# Patient Record
Sex: FEMALE | Race: WHITE | Employment: OTHER | URBAN - METROPOLITAN AREA
[De-identification: names, ages, dates, MRNs, and addresses within clinical notes are randomized per-mention and may not be internally consistent; named-entity substitution may affect disease eponyms.]

---

## 2018-12-13 PROBLEM — E11.9 DIABETES (HCC): Status: ACTIVE | Noted: 2018-12-13

## 2018-12-13 PROBLEM — I10 HYPERTENSION: Status: ACTIVE | Noted: 2018-12-13

## 2018-12-13 PROBLEM — M19.90 ARTHRITIS: Status: ACTIVE | Noted: 2018-12-13

## 2020-02-26 PROBLEM — E11.319: Status: ACTIVE | Noted: 2018-12-13

## 2020-02-28 PROBLEM — H35.3122 INTERMEDIATE STAGE NONEXUDATIVE AGE-RELATED MACULAR DEGENERATION OF LEFT EYE: Status: ACTIVE | Noted: 2020-02-28

## 2020-02-28 PROBLEM — H35.81 RETINAL EDEMA: Status: ACTIVE | Noted: 2020-02-28

## 2020-07-28 PROBLEM — Z85.51 HISTORY OF BLADDER CANCER: Status: ACTIVE | Noted: 2020-07-28

## 2020-07-28 PROBLEM — Z90.6 HISTORY OF TOTAL CYSTECTOMY: Status: ACTIVE | Noted: 2020-07-28

## 2020-07-28 PROBLEM — Z43.2 ILEOSTOMY CARE (HCC): Status: ACTIVE | Noted: 2020-07-28

## 2020-10-21 ENCOUNTER — HOME HEALTH ADMISSION (OUTPATIENT)
Dept: HOME HEALTH SERVICES | Facility: HOME HEALTH | Age: 85
End: 2020-10-21

## 2022-03-18 PROBLEM — M19.90 ARTHRITIS: Status: ACTIVE | Noted: 2018-12-13

## 2022-03-18 PROBLEM — Z90.6 HISTORY OF TOTAL CYSTECTOMY: Status: ACTIVE | Noted: 2020-07-28

## 2022-03-19 PROBLEM — Z85.51 HISTORY OF BLADDER CANCER: Status: ACTIVE | Noted: 2020-07-28

## 2022-03-19 PROBLEM — E11.319: Status: ACTIVE | Noted: 2018-12-13

## 2022-03-19 PROBLEM — I10 ESSENTIAL HYPERTENSION: Status: ACTIVE | Noted: 2018-12-13

## 2022-03-20 PROBLEM — H35.3122 INTERMEDIATE STAGE NONEXUDATIVE AGE-RELATED MACULAR DEGENERATION OF LEFT EYE: Status: ACTIVE | Noted: 2020-02-28

## 2022-03-20 PROBLEM — H35.81 RETINAL EDEMA: Status: ACTIVE | Noted: 2020-02-28

## 2022-04-07 ENCOUNTER — HOME HEALTH ADMISSION (OUTPATIENT)
Dept: HOME HEALTH SERVICES | Facility: HOME HEALTH | Age: 87
End: 2022-04-07
Payer: MEDICARE

## 2022-04-08 ENCOUNTER — HOME CARE VISIT (OUTPATIENT)
Dept: SCHEDULING | Facility: HOME HEALTH | Age: 87
End: 2022-04-08
Payer: MEDICARE

## 2022-04-08 PROCEDURE — 400013 HH SOC

## 2022-04-08 PROCEDURE — MED11061 SALINE,.9%,15 ML,VIAL

## 2022-04-08 PROCEDURE — A6197 ALGINATE DRSG >16 <=48 SQ IN: HCPCS

## 2022-04-08 PROCEDURE — G0299 HHS/HOSPICE OF RN EA 15 MIN: HCPCS

## 2022-04-08 PROCEDURE — 400018 HH-NO PAY CLAIM PROCEDURE

## 2022-04-08 PROCEDURE — A6257 TRANSPARENT FILM <= 16 SQ IN: HCPCS

## 2022-04-08 PROCEDURE — 3331090002 HH PPS REVENUE DEBIT

## 2022-04-08 PROCEDURE — A6216 NON-STERILE GAUZE<=16 SQ IN: HCPCS

## 2022-04-08 PROCEDURE — A9270 NON-COVERED ITEM OR SERVICE: HCPCS

## 2022-04-08 PROCEDURE — 3331090001 HH PPS REVENUE CREDIT

## 2022-04-09 PROCEDURE — 3331090001 HH PPS REVENUE CREDIT

## 2022-04-09 PROCEDURE — 3331090002 HH PPS REVENUE DEBIT

## 2022-04-10 VITALS
RESPIRATION RATE: 18 BRPM | HEART RATE: 71 BPM | OXYGEN SATURATION: 98 % | TEMPERATURE: 98 F | SYSTOLIC BLOOD PRESSURE: 134 MMHG | DIASTOLIC BLOOD PRESSURE: 76 MMHG

## 2022-04-10 PROCEDURE — 3331090002 HH PPS REVENUE DEBIT

## 2022-04-10 PROCEDURE — 3331090001 HH PPS REVENUE CREDIT

## 2022-04-11 ENCOUNTER — HOME CARE VISIT (OUTPATIENT)
Dept: SCHEDULING | Facility: HOME HEALTH | Age: 87
End: 2022-04-11
Payer: MEDICARE

## 2022-04-11 VITALS
SYSTOLIC BLOOD PRESSURE: 138 MMHG | TEMPERATURE: 98.8 F | RESPIRATION RATE: 20 BRPM | OXYGEN SATURATION: 97 % | DIASTOLIC BLOOD PRESSURE: 88 MMHG | HEART RATE: 72 BPM

## 2022-04-11 PROCEDURE — 3331090001 HH PPS REVENUE CREDIT

## 2022-04-11 PROCEDURE — 3331090002 HH PPS REVENUE DEBIT

## 2022-04-11 PROCEDURE — G0299 HHS/HOSPICE OF RN EA 15 MIN: HCPCS

## 2022-04-12 PROCEDURE — 3331090001 HH PPS REVENUE CREDIT

## 2022-04-12 PROCEDURE — 3331090002 HH PPS REVENUE DEBIT

## 2022-04-13 ENCOUNTER — HOME CARE VISIT (OUTPATIENT)
Dept: SCHEDULING | Facility: HOME HEALTH | Age: 87
End: 2022-04-13
Payer: MEDICARE

## 2022-04-13 VITALS
DIASTOLIC BLOOD PRESSURE: 57 MMHG | SYSTOLIC BLOOD PRESSURE: 122 MMHG | HEART RATE: 77 BPM | OXYGEN SATURATION: 96 % | TEMPERATURE: 99.3 F | RESPIRATION RATE: 18 BRPM

## 2022-04-13 PROCEDURE — 3331090001 HH PPS REVENUE CREDIT

## 2022-04-13 PROCEDURE — G0299 HHS/HOSPICE OF RN EA 15 MIN: HCPCS

## 2022-04-13 PROCEDURE — 3331090002 HH PPS REVENUE DEBIT

## 2022-04-14 PROCEDURE — 3331090001 HH PPS REVENUE CREDIT

## 2022-04-14 PROCEDURE — 3331090002 HH PPS REVENUE DEBIT

## 2022-04-15 ENCOUNTER — HOME CARE VISIT (OUTPATIENT)
Dept: SCHEDULING | Facility: HOME HEALTH | Age: 87
End: 2022-04-15
Payer: MEDICARE

## 2022-04-15 VITALS
OXYGEN SATURATION: 99 % | TEMPERATURE: 98.9 F | DIASTOLIC BLOOD PRESSURE: 82 MMHG | SYSTOLIC BLOOD PRESSURE: 122 MMHG | RESPIRATION RATE: 18 BRPM | HEART RATE: 68 BPM

## 2022-04-15 PROCEDURE — 3331090001 HH PPS REVENUE CREDIT

## 2022-04-15 PROCEDURE — A6252 ABSORPT DRG >16 <=48 W/O BDR: HCPCS

## 2022-04-15 PROCEDURE — 3331090002 HH PPS REVENUE DEBIT

## 2022-04-15 PROCEDURE — MED12521

## 2022-04-15 PROCEDURE — MED10156 SCISSORS,BANDAGE,LISTER,5.5

## 2022-04-15 PROCEDURE — G0299 HHS/HOSPICE OF RN EA 15 MIN: HCPCS

## 2022-04-15 PROCEDURE — A6258 TRANSPARENT FILM >16<=48 IN: HCPCS

## 2022-04-16 PROCEDURE — 3331090001 HH PPS REVENUE CREDIT

## 2022-04-16 PROCEDURE — 3331090002 HH PPS REVENUE DEBIT

## 2022-04-17 PROCEDURE — 3331090001 HH PPS REVENUE CREDIT

## 2022-04-17 PROCEDURE — 3331090002 HH PPS REVENUE DEBIT

## 2022-04-18 ENCOUNTER — HOME CARE VISIT (OUTPATIENT)
Dept: SCHEDULING | Facility: HOME HEALTH | Age: 87
End: 2022-04-18
Payer: MEDICARE

## 2022-04-18 VITALS
SYSTOLIC BLOOD PRESSURE: 136 MMHG | OXYGEN SATURATION: 95 % | HEART RATE: 73 BPM | TEMPERATURE: 98.6 F | RESPIRATION RATE: 18 BRPM | DIASTOLIC BLOOD PRESSURE: 68 MMHG

## 2022-04-18 PROCEDURE — G0299 HHS/HOSPICE OF RN EA 15 MIN: HCPCS

## 2022-04-18 PROCEDURE — 3331090001 HH PPS REVENUE CREDIT

## 2022-04-18 PROCEDURE — 3331090002 HH PPS REVENUE DEBIT

## 2022-04-19 PROCEDURE — 3331090001 HH PPS REVENUE CREDIT

## 2022-04-19 PROCEDURE — 3331090002 HH PPS REVENUE DEBIT

## 2022-04-20 PROCEDURE — 3331090002 HH PPS REVENUE DEBIT

## 2022-04-20 PROCEDURE — 3331090001 HH PPS REVENUE CREDIT

## 2022-04-21 ENCOUNTER — HOME CARE VISIT (OUTPATIENT)
Dept: SCHEDULING | Facility: HOME HEALTH | Age: 87
End: 2022-04-21
Payer: MEDICARE

## 2022-04-21 VITALS
TEMPERATURE: 98.6 F | SYSTOLIC BLOOD PRESSURE: 126 MMHG | DIASTOLIC BLOOD PRESSURE: 60 MMHG | OXYGEN SATURATION: 98 % | HEART RATE: 69 BPM | RESPIRATION RATE: 18 BRPM

## 2022-04-21 PROCEDURE — A4371 SKIN BARRIER POWDER PER OZ: HCPCS

## 2022-04-21 PROCEDURE — A4385 OST SKN BARRIER SLD EXT WEAR: HCPCS

## 2022-04-21 PROCEDURE — 3331090002 HH PPS REVENUE DEBIT

## 2022-04-21 PROCEDURE — G0299 HHS/HOSPICE OF RN EA 15 MIN: HCPCS

## 2022-04-21 PROCEDURE — 3331090001 HH PPS REVENUE CREDIT

## 2022-04-21 PROCEDURE — A5120 SKIN BARRIER, WIPE OR SWAB: HCPCS

## 2022-04-22 PROCEDURE — 3331090002 HH PPS REVENUE DEBIT

## 2022-04-22 PROCEDURE — 3331090001 HH PPS REVENUE CREDIT

## 2022-04-23 PROCEDURE — 3331090002 HH PPS REVENUE DEBIT

## 2022-04-23 PROCEDURE — 3331090001 HH PPS REVENUE CREDIT

## 2022-04-24 PROCEDURE — 3331090002 HH PPS REVENUE DEBIT

## 2022-04-24 PROCEDURE — 3331090001 HH PPS REVENUE CREDIT

## 2022-04-25 ENCOUNTER — HOME CARE VISIT (OUTPATIENT)
Dept: SCHEDULING | Facility: HOME HEALTH | Age: 87
End: 2022-04-25
Payer: MEDICARE

## 2022-04-25 VITALS
TEMPERATURE: 98.9 F | DIASTOLIC BLOOD PRESSURE: 68 MMHG | SYSTOLIC BLOOD PRESSURE: 135 MMHG | RESPIRATION RATE: 18 BRPM | HEART RATE: 80 BPM | OXYGEN SATURATION: 98 %

## 2022-04-25 PROCEDURE — 3331090002 HH PPS REVENUE DEBIT

## 2022-04-25 PROCEDURE — G0299 HHS/HOSPICE OF RN EA 15 MIN: HCPCS

## 2022-04-25 PROCEDURE — 3331090001 HH PPS REVENUE CREDIT

## 2022-04-26 PROCEDURE — 3331090002 HH PPS REVENUE DEBIT

## 2022-04-26 PROCEDURE — 3331090001 HH PPS REVENUE CREDIT

## 2022-04-27 ENCOUNTER — HOME CARE VISIT (OUTPATIENT)
Dept: SCHEDULING | Facility: HOME HEALTH | Age: 87
End: 2022-04-27
Payer: MEDICARE

## 2022-04-27 VITALS
TEMPERATURE: 98.7 F | OXYGEN SATURATION: 100 % | RESPIRATION RATE: 18 BRPM | HEART RATE: 68 BPM | DIASTOLIC BLOOD PRESSURE: 52 MMHG | SYSTOLIC BLOOD PRESSURE: 122 MMHG

## 2022-04-27 PROCEDURE — A6196 ALGINATE DRESSING <=16 SQ IN: HCPCS

## 2022-04-27 PROCEDURE — 3331090001 HH PPS REVENUE CREDIT

## 2022-04-27 PROCEDURE — 3331090002 HH PPS REVENUE DEBIT

## 2022-04-27 PROCEDURE — A6252 ABSORPT DRG >16 <=48 W/O BDR: HCPCS

## 2022-04-27 PROCEDURE — A6259 TRANSPARENT FILM > 48 SQ IN: HCPCS

## 2022-04-27 PROCEDURE — G0299 HHS/HOSPICE OF RN EA 15 MIN: HCPCS

## 2022-04-28 PROCEDURE — 3331090001 HH PPS REVENUE CREDIT

## 2022-04-28 PROCEDURE — 3331090002 HH PPS REVENUE DEBIT

## 2022-04-29 ENCOUNTER — HOME CARE VISIT (OUTPATIENT)
Dept: SCHEDULING | Facility: HOME HEALTH | Age: 87
End: 2022-04-29
Payer: MEDICARE

## 2022-04-29 ENCOUNTER — HOSPITAL ENCOUNTER (OUTPATIENT)
Dept: WOUND CARE | Age: 87
Discharge: HOME | End: 2022-04-29
Attending: SURGERY
Payer: MEDICARE

## 2022-04-29 VITALS
SYSTOLIC BLOOD PRESSURE: 167 MMHG | DIASTOLIC BLOOD PRESSURE: 83 MMHG | HEART RATE: 70 BPM | WEIGHT: 154 LBS | BODY MASS INDEX: 30.23 KG/M2 | RESPIRATION RATE: 18 BRPM | TEMPERATURE: 97 F | HEIGHT: 60 IN

## 2022-04-29 DIAGNOSIS — I87.2 VENOUS STASIS DERMATITIS OF RIGHT LOWER EXTREMITY: ICD-10-CM

## 2022-04-29 DIAGNOSIS — I89.0 LYMPHEDEMA: ICD-10-CM

## 2022-04-29 DIAGNOSIS — I87.331 CHRONIC VENOUS HTN W ULCER AND INFLAMMATION OF R LOW EXTREM (HCC): ICD-10-CM

## 2022-04-29 DIAGNOSIS — L97.919 CHRONIC VENOUS HTN W ULCER AND INFLAMMATION OF R LOW EXTREM (HCC): ICD-10-CM

## 2022-04-29 DIAGNOSIS — E11.622 TYPE 2 DIABETES MELLITUS WITH OTHER SKIN ULCER, WITHOUT LONG-TERM CURRENT USE OF INSULIN (HCC): ICD-10-CM

## 2022-04-29 PROBLEM — E11.628 TYPE 2 DIABETES MELLITUS WITH SKIN COMPLICATION, WITHOUT LONG-TERM CURRENT USE OF INSULIN (HCC): Status: ACTIVE | Noted: 2018-12-13

## 2022-04-29 PROCEDURE — 3331090001 HH PPS REVENUE CREDIT

## 2022-04-29 PROCEDURE — 3331090002 HH PPS REVENUE DEBIT

## 2022-04-29 PROCEDURE — 29581 APPL MULTLAYER CMPRN SYS LEG: CPT

## 2022-04-29 PROCEDURE — 99204 OFFICE O/P NEW MOD 45 MIN: CPT | Performed by: SURGERY

## 2022-04-29 NOTE — WOUND CARE
Multilayer Compression Wrap   (Not Unna) Below the Knee    NAME:  Magaly Yu  YOB: 1935  MEDICAL RECORD NUMBER:  [id number]  DATE:  4/29/2022    Removed old Multilayer wrap if indicated and wash leg with mild soap/water. Applied moisturizing agent to dry skin as needed. Applied primary and secondary dressing as ordered. Applied multilayered dressing below the knee to right lower leg. Instructed patient/caregiver not to remove dressing and to keep it clean and dry. Instructed patient/caregiver on complications to report to provider, such as pain, numbness in toes, heavy drainage, and slippage of dressing. Instructed patient on purpose of compression dressing and on activity and exercise recommendations.     Response to treatment: Well tolerated by patient       Electronically signed by Hussein Orantes RN on 4/29/2022 at 11:02 AM

## 2022-04-29 NOTE — DISCHARGE INSTRUCTIONS
[facility]ail [facility], [address]  Phone: [phone]  Fax: [phone]     Patient: Shayy Edwards MRN: [id number]  SSN: [id number]    YOB: 1935  Age: 81 y.o. Sex: female       Return Appointment: 1 week with Dr. Amie Steele     Instructions: Right lower leg wounds:  Clean wound with saline. Apply zinc patches to wounds. Cover with ABD or exudry. Wrap right lower leg with 2 layer compression system. Home health to change dressing on Monday.     Follow up in wound center on Thursday, May 5th. Do not get wrap wet in shower.  May purchase cast cover from [employer] to keep dry in the shower.        Should you experience increased redness, swelling, pain, foul odor, size of wound(s), or have a temperature over 101 degrees please contact the [address] [place] [address] Road at [phone] or if after hours contact your primary care physician or go to the hospital emergency department.     Signed By: Caryn Boss RN      April 29, 2022

## 2022-04-29 NOTE — PROGRESS NOTES
[address]  Consult Note/H&P/Progress Note      [address] RECORD NUMBER:  [id number]  AGE: 81 y.o. RACE WHITE/NON-  GENDER: female  : 1935  EPISODE DATE:  2022       Subjective:      CC (Chief Complaint) and HISTORY of PRESENT ILLNESS (HPI):    Alyssa Velasco is a 81 y.o. female who presents today for wound/ulcer evaluation. Her first visit to the wound center with us was on 2022  She reported progressive ulcerations and blistering of her right lower extremity since approximately 2021. Nothing in particular made her condition better or worse. She associated the condition with use of a venous thrombosis pump which was placed on her leg in the summer 2021.   No associated fevers    She has been diabetic for approximately 20 years       This information was obtained from the patient  The following HPI elements were documented for the patient's wound:  Location: Right lower extremity ulcers  Duration: Since approximately 2021  Severity: Moderate severity  Context: History of diabetes mellitus for 20 years  Modifying Factors:  Nothing makes better or worse  Quality: No reported history of DVT  Timing:     Associated Signs and Symptoms: No fevers      Ulcer Identification:  Ulcer Type: venous    Contributing Factors: venous stasis, lymphedema and diabetes            PAST MEDICAL HISTORY  Past Medical History:   Diagnosis Date    Arthritis     Cancer ([address]         PAST SURGICAL HISTORY  Past Surgical History:   Procedure Laterality Date    HX ACL RECONSTRUCTION      HX AMPUTATION TOE  2021    pt had a hangmail which infected    HX CATARACT REMOVAL Left     HX CHOLECYSTECTOMY      HX CYSTECTOMY  2008    Bladder removal wears a bag    HX GYN      HX ORTHOPAEDIC      HX TOTAL VAGINAL HYSTERECTOMY         FAMILY HISTORY  Family History   Problem Relation Age of Onset    Heart Disease Father  Diabetes Sister     Heart Disease Sister        SOCIAL HISTORY  Social History     Tobacco Use    Smoking status: Never Smoker    Smokeless tobacco: Never Used   Vaping Use    Vaping Use: Never used   Substance Use Topics    Alcohol use: No    Drug use: No       ALLERGIES  Allergies   Allergen Reactions    Penicillins Rash       MEDICATIONS  Current Outpatient Medications on File Prior to Encounter   Medication Sig Dispense Refill    atenoloL (TENORMIN) 50 mg tablet TAKE 2 TABLETS BY MOUTH DAILY WITH BREAKFAST 180 Tablet 0    losartan (COZAAR) 50 mg tablet Take 1 Tablet by mouth daily. 90 Tablet 0    metFORMIN ER (GLUCOPHAGE XR) 500 mg tablet Take 1 Tablet by mouth two (2) times a day. 180 Tablet 3    furosemide (LASIX) 20 mg tablet Take 1 Tablet by mouth daily. 30 Tablet 3    triamcinolone acetonide (KENALOG) 0.1 % topical cream Apply  to affected area two (2) times a day. use thin layer (Patient taking differently: Apply  to affected area two (2) times a day. use thin layer on RLE) 453.6 g 0    Blood-Glucose Meter monitoring kit Check sugar once daily, dx diabetes mellitus, uncomplicated 1 Kit 0    multivitamin, tx-iron-ca-min (THERA-M W/ IRON) 9 mg iron-400 mcg tab tablet Take 1 Tab by mouth daily.  acetaminophen (TYLENOL EXTRA STRENGTH) 500 mg tablet Take 500 mg by mouth every six (6) hours as needed for Pain.  Calcium-Cholecalciferol, D3, (CALCIUM 600 WITH VITAMIN D3) 600 mg(1,500mg) -400 unit chew Take 1 Tablet by mouth daily.  B.infantis-B.ani-B.long-B.bifi (PROBIOTIC 4X) 10-15 mg TbEC Take 1 Tablet by mouth daily. No current facility-administered medications on file prior to encounter. REVIEW OF SYSTEMS  The patient has no difficulty with chest pain or shortness of breath. No fever or chills. Comprehensive review of systems was otherwise unremarkable except as noted above.       Objective:   Physical Exam:   Recent vitals (if inpt):  Patient Vitals for the past 24 hrs:   BP Temp Pulse Resp Height Weight   04/29/22 1021 (!) 167/83 97 °F (36.1 °C) 70 18 5' 0.02\" (1.525 m) 154 lb (69.9 kg)       Visit Vitals  BP (!) 167/83 (BP 1 Location: Left upper arm, BP Patient Position: At rest)   Pulse 70   Temp 97 °F (36.1 °C)   Resp 18   Ht 5' 0.02\" (1.525 m)   Wt 154 lb (69.9 kg)   BMI 30.05 kg/m²     Wt Readings from Last 3 Encounters:   04/29/22 154 lb (69.9 kg)   04/06/22 157 lb (71.2 kg)   02/26/20 169 lb (76.7 kg)     Constitutional: Alert, oriented, cooperative patient in no acute distress; appears stated age;  General appearance is within normal limits for wound care patient population. See the today's recorded vitals signs and constitutional data. Eyes: Pupils equal; Sclera are clear. EOMs intact; The eyes appear to track and move normally. The sclera are not injected. The conjunctive are clear. The eyelids are normal. There is no scleral icterus. ENMT: There are no obvious external ear, nose, lip or mouth lesions. Nares normal; Neck: Overall contour of the neck is normal with no obvious neck masses. Gross hearing is within normal limits. No obvious neck masses  Resp:  Breathing is non-labored; normal rate and effort; no audible wheezing. CV: RRR;  no JVD; No evidence of cyanosis of the upper extremities. The extremities are perfused without embolic sign, splinter hemorrhages, or petechia. GI: soft and non-distended without acute abnormality noted. Musculoskeletal: unremarkable with normal function. No embolic signs or cyanosis. Neuro:  Oriented; moves all 4; no focal deficits  Psychiatric: Judgement and insight are within normal limits for the wound care population of patients. Patient is oriented to person, place, and time. Recent and remote memory are within normal limits. Mood and affect are within normal limits. Integumentary (Skin/Wounds)  See inspection of wound(s) below. There are no other skin areas of palpable concern.    See wound center documentation including photos in the [address] Wound Template made part of this record by reference.      Wounds:  Wound Leg lower Right;Medial;Proximal #1 (Active)   Wound Image   04/29/22 1032   Wound Etiology Venous 04/29/22 1032   Dressing Status Old drainage noted 04/29/22 1032   Cleansed Cleansed with saline 04/29/22 1032   Dressing/Treatment ABD pad 04/29/22 1032   Wound Length (cm) 1.5 cm 04/29/22 1032   Wound Width (cm) 2.5 cm 04/29/22 1032   Wound Depth (cm) 0.1 cm 04/29/22 1032   Wound Surface Area (cm^2) 3.75 cm^2 04/29/22 1032   Change in Wound Size % 25 04/29/22 1032   Wound Volume (cm^3) 0.375 cm^3 04/29/22 1032   Wound Healing % 25 04/29/22 1032   Wound Assessment Slough;Bunker/red 04/29/22 1032   Drainage Amount Moderate 04/29/22 1032   Drainage Description Serous 04/29/22 1032   Wound Odor None 04/29/22 1032   Jennifer-Wound/Incision Assessment Edematous 04/29/22 1032   Wound Thickness Description Full thickness 04/29/22 1032   Number of days: 21       Wound Leg lower Lower;Right;Medial;Distal #2 (Active)   Wound Image   04/29/22 1032   Wound Etiology Venous 04/29/22 1032   Dressing Status Old drainage noted 04/29/22 1032   Cleansed Cleansed with saline 04/29/22 1032   Dressing/Treatment ABD pad 04/29/22 1032   Wound Length (cm) 1 cm 04/29/22 1032   Wound Width (cm) 0.6 cm 04/29/22 1032   Wound Depth (cm) 0.1 cm 04/29/22 1032   Wound Surface Area (cm^2) 0.6 cm^2 04/29/22 1032   Change in Wound Size % 31.82 04/29/22 1032   Wound Volume (cm^3) 0.06 cm^3 04/29/22 1032   Wound Healing % 66 04/29/22 1032   Wound Assessment Pink/red 04/29/22 1032   Drainage Amount Moderate 04/29/22 1032   Drainage Description Serous 04/29/22 1032   Wound Odor None 04/29/22 1032   Jennifer-Wound/Incision Assessment Edematous 04/29/22 1032   Wound Thickness Description Full thickness 04/29/22 1032   Number of days: 18       Wound Leg lower Right;Posterior;Proximal #3 (Active)   Wound Image   04/29/22 1032   Wound Etiology Venous 04/29/22 1032   Dressing Status Old drainage noted 04/29/22 1032   Cleansed Cleansed with saline 04/29/22 1032   Dressing/Treatment ABD pad 04/29/22 1032   Wound Length (cm) 1 cm 04/29/22 1032   Wound Width (cm) 1.4 cm 04/29/22 1032   Wound Depth (cm) 0.1 cm 04/29/22 1032   Wound Surface Area (cm^2) 1.4 cm^2 04/29/22 1032   Change in Wound Size % 22.22 04/29/22 1032   Wound Volume (cm^3) 0.14 cm^3 04/29/22 1032   Wound Healing % 22 04/29/22 1032   Wound Assessment Pink/red 04/29/22 1032   Drainage Amount Moderate 04/29/22 1032   Drainage Description Serous 04/29/22 1032   Wound Odor None 04/29/22 1032   Jennifer-Wound/Incision Assessment Edematous 04/29/22 1032   Wound Thickness Description Full thickness 04/29/22 1032   Number of days: 18       Wound Leg Lower; Posterior;Right;Distal #4 (Active)   Wound Image   04/29/22 1032   Wound Etiology Venous 04/29/22 1032   Dressing Status Old drainage noted 04/29/22 1032   Cleansed Cleansed with saline 04/29/22 1032   Dressing/Treatment ABD pad 04/29/22 1032   Wound Length (cm) 2.2 cm 04/29/22 1032   Wound Width (cm) 1.2 cm 04/29/22 1032   Wound Depth (cm) 0.1 cm 04/29/22 1032   Wound Surface Area (cm^2) 2.64 cm^2 04/29/22 1032   Change in Wound Size % 14.29 04/29/22 1032   Wound Volume (cm^3) 0.264 cm^3 04/29/22 1032   Wound Healing % 14 04/29/22 1032   Wound Assessment Pink/red 04/29/22 1032   Drainage Amount Moderate 04/29/22 1032   Drainage Description Serous 04/29/22 1032   Wound Odor None 04/29/22 1032   Jennifer-Wound/Incision Assessment Edematous 04/29/22 1032   Wound Thickness Description Full thickness 04/29/22 1032   Number of days: 2                          Amount and/or Complexity of Data Reviewed and Analyzed:  I reviewed and analyzed all of the unique labs and radiologic studies that are shown below as well as any that are in the HPI, and any that are in the expanded problem list below  *Each unique test, order, or document contributes to the combination of 2 or combination of 3 in Category 1 below. I also independently reviewed radiology images for studies I described above in the HPI or studies I have ordered. For this visit I also reviewed old records and prior notes. No results for input(s): WBC, HGB, PLT, NA, K, CL, CO2, BUN, CREA, GLU, PTP, INR, APTT, TBIL, TBILI, CBIL, ALT, AP, AML, AML, LPSE, LCAD, NH4, TROPT, TROIQ, PCO2, PO2, HCO3, HGBEXT, PLTEXT, INREXT, HGBEXT, PLTEXT, INREXT in the last 72 hours. No lab exists for component: SGOT, GPT,  PH  No results for input(s): PTP, INR, APTT, TBIL, TBILI, CBIL, ALT, AP, AML, LPSE, INREXT, INREXT in the last 72 hours. No lab exists for component: SGOT, GPT    Most recent blood counts (CBC) review  Lab Results   Component Value Date/Time    WBC 6.5 04/06/2022 04:31 PM    HGB 10.3 (L) 04/06/2022 04:31 PM    HCT 32.3 (L) 04/06/2022 04:31 PM    PLATELET 003 33/78/5270 04:31 PM    MCV 93 04/06/2022 04:31 PM     Most recent chemistry (BMP) test review   Lab Results   Component Value Date/Time    Sodium 144 04/06/2022 04:31 PM    Potassium 4.8 04/06/2022 04:31 PM    Chloride 110 (H) 04/06/2022 04:31 PM    CO2 19 (L) 04/06/2022 04:31 PM    Glucose 119 (H) 04/06/2022 04:31 PM    BUN 30 (H) 04/06/2022 04:31 PM    Creatinine 1.38 (H) 04/06/2022 04:31 PM    BUN/Creatinine ratio 22 04/06/2022 04:31 PM    GFR est AA 34 (L) 07/31/2020 11:48 AM    GFR est non-AA 30 (L) 07/31/2020 11:48 AM    Calcium 9.7 04/06/2022 04:31 PM     Most recent Liver enzyme test review  Lab Results   Component Value Date/Time    ALT (SGPT) 13 04/06/2022 04:31 PM    AST (SGOT) 26 04/06/2022 04:31 PM    Alk.  phosphatase 104 04/06/2022 04:31 PM    Bilirubin, total 0.3 04/06/2022 04:31 PM     Most recent coagulation (coags) review  No results found for: INR, PTMR, PTP, PT1, PT2, INREXT, INREXT  No results found for: INR, APTT, CBIL, AML, AML, LPSE, LCAD, NH4, TROPT, TROIQ, INREXT, INREXT    Diabetic assessment  Lab Results   Component Value Date/Time    Hemoglobin A1c 6.9 (H) 04/06/2022 04:31 PM       Nutritional assessment screen to assess wound healing ability:  Lab Results   Component Value Date/Time    Protein, total 7.3 04/06/2022 04:31 PM    Albumin 4.0 04/06/2022 04:31 PM     Lab Results   Component Value Date/Time    Protein, total 7.3 04/06/2022 04:31 PM    Albumin 4.0 04/06/2022 04:31 PM       XR Results (most recent):  No results found for this or any previous visit. CT Results (most recent):  No results found for this or any previous visit.         Admission date (for inpatients): 4/29/2022   * No surgery found *  * No surgery found *    Assessment:      Problem List Items Addressed This Visit     None          Problem List  Date Reviewed: 4/29/2022          Codes Class Noted    BMI 30.0-30.9,adult ICD-10-CM: Z68.30  ICD-9-CM: V85.30  4/29/2022        Chronic venous htn w ulcer and inflammation of r low extrem ([facility] ICD-10-CM: I87.331, L97.919  ICD-9-CM: 459.33  4/29/2022        Lymphedema ICD-10-CM: I89.0  ICD-9-CM: 457.1  4/29/2022        Venous stasis dermatitis of right lower extremity ICD-10-CM: I87.2  ICD-9-CM: 454.1  4/29/2022        History of bladder cancer ICD-10-CM: Z85.51  ICD-9-CM: V10.51  7/28/2020        History of total cystectomy ICD-10-CM: Z90.6  ICD-9-CM: V45.74  7/28/2020    Overview Signed 7/28/2020  4:15 PM by LYNN Cervantes     2/2008--due to bladder CA             Retinal edema ICD-10-CM: H35.81  ICD-9-CM: 362.83  2/28/2020        Intermediate stage nonexudative age-related macular degeneration of left eye ICD-10-CM: H35.3122  ICD-9-CM: 362.51  2/28/2020        Essential hypertension ICD-10-CM: I10  ICD-9-CM: 401.9  12/13/2018        Type 2 diabetes mellitus with skin complication, without long-term current use of insulin ([facility] ICD-10-CM: E11.628  ICD-9-CM: 250.80  12/13/2018        Arthritis ICD-10-CM: M19.90  ICD-9-CM: 716.90  12/13/2018    Overview Signed 2/26/2020  5:22 PM by Angelika, [place], [address]     Lower back                   Active Problems:    Type 2 diabetes mellitus with skin complication, without long-term current use of insulin ([address] (12/13/2018)      BMI 30.0-30.9,adult (4/29/2022)      Chronic venous htn w ulcer and inflammation of r low extrem ([address] (4/29/2022)      Lymphedema (4/29/2022)      Venous stasis dermatitis of right lower extremity (4/29/2022)            Plan:     Number and Complexity of Problems addressed and   Risks of complications and/or morbidity of management    Treatment Note please see attached Discharge Instructions  Any procedures done today in the wound center (if documented in a separate note) in [facility] are made part of this record by reference  Written patient dismissal instructions given to patient or POA. Right lower extremity venous stasis ulcers  Weekly multilayer compression dressings. This first week she will have a second dressing performed in the same week by home health  We encouraged elevation  We encouraged her to purchase a cast cover from [employer] to keep the dressing dry. Venous dermatitis  Weekly Desitin    Lymphedema  Elevation  Salt avoidance  Compression  Weight loss      DM2 with HgbA1c 6.9 on 4/6/22  Encourage adherence to diabetic diet and diabetic medical regimen to help facilitate wound healing  Encourage close follow-up with primary care physician  Encourage HgbA1c q3 months      BMI>30  Encourage weight loss          Wound Care  No orders of the defined types were placed in this encounter.       Follow-up Information     Follow up With Specialties Details Why Contact Info    [address] In 1 week  Manish Matson [address] [facility] [address] [phone]                  Level of MDM (2/3 elements below)  Number and Complexity of Problems Addressed Amount and/or Complexity of Data to be Reviewed and Analyzed  *Each unique test, order, or document contributes to the combination of 2 or combination of 3 in Category 1 below.  Risk of Complications and/or Morbidity or Mortality of pt Management     46485  32388 SF Minimal  1self-limited or minor problem Minimal or none Minimal risk of morbidity from additional diagnostic testing or Rx   87969  56440 Low Low  2or more self-limited or minor problems;    or  1stable chronic illness;    or  4HYSIE, uncomplicated illness or injury   Limited  (Must meet the requirements of at least 1 of the 2 categories)  Category 1: Tests and documents   Any combination of 2 from the following:  Review of prior external note(s) from each unique source*;  review of the result(s) of each unique test*;   ordering of each unique test*    or   Category 2: Assessment requiring an independent historian(s)  (For the categories of independent interpretation of tests and discussion of management or test interpretation, see moderate or high) Low risk of morbidity from additional diagnostic testing or treatment     14444  41103 Mod Moderate  1or more chronic illnesses with exacerbation, progression, or side effects of treatment;    or  2or more stable chronic illnesses;    or  1undiagnosed new problem with uncertain prognosis;    or  1acute illness with systemic symptoms;    or  2ZLRWF complicated injury   Moderate  (Must meet the requirements of at least 1 out of 3 categories)  Category 1: Tests, documents, or independent historian(s)  Any combination of 3 from the following:   Review of prior external note(s) from each unique source*;  Review of the result(s) of each unique test*;  Ordering of each unique test*;  Assessment requiring an independent historian(s)    or  Category 2: Independent interpretation of tests   Independent interpretation of a test performed by another physician/other qualified health care professional (not separately reported);     or  Category 3: Discussion of management or test interpretation  Discussion of management or test interpretation with external physician/other qualified health care professional/appropriate source (not separately reported)   Moderate risk of morbidity from additional diagnostic testing or treatment  Examples only:  Prescription drug management   Decision regarding minor surgery with identified patient or procedure risk factors  Decision regarding elective major surgery without identified patient or procedure risk factors   Diagnosis or treatment significantly limited by social determinants of health       82848  31544 High High  1or more chronic illnesses with severe exacerbation, progression, or side effects of treatment;    or  1 acute or chronic illness or injury that poses a threat to life or bodily function   Extensive  (Must meet the requirements of at least 2 out of 3 categories)  Category 1: Tests, documents, or independent historian(s)  Any combination of 3 from the following:   Review of prior external note(s) from each unique source*;  Review of the result(s) of each unique test*;   Ordering of each unique test*;   Assessment requiring an independent historian(s)    or   Category 2: Independent interpretation of tests   Independent interpretation of a test performed by another physician/other qualified health care professional (not separately reported);     or  Category 3: Discussion of management or test interpretation  Discussion of management or test interpretation with external physician/other qualified health care professional/appropriate source (not separately reported)   High risk of morbidity from additional diagnostic testing or treatment  Examples only:  Drug therapy requiring intensive monitoring for toxicity  Decision regarding elective major surgery with identified patient or procedure risk factors  Decision regarding emergency major surgery  Decision regarding hospitalization  Decision not to resuscitate or to de-escalate care because of poor prognosis                 I have personally performed a face-to-face diagnostic evaluation and management  service on this patient. I have independently seen the patient. I have independently obtained the above history from the patient/family. I have independently examined the patient with above findings. I have independently reviewed data/labs for this patient and developed the above plan of care (MDM).   Electronically signed by Zion Carrero MD on 4/29/2022 at 10:06 AM

## 2022-04-29 NOTE — WOUND CARE
04/29/22 1032   Wound Leg lower Right;Medial;Proximal #1   Date First Assessed: 04/08/22   Primary Wound Type: (c) Venous  Location: Leg lower  Wound Location Orientation: Right;Medial;Proximal  Wound Description: #1   Wound Image    Wound Etiology Venous   Dressing Status Old drainage noted   Cleansed Cleansed with saline   Dressing/Treatment ABD pad   Wound Length (cm) 1.5 cm   Wound Width (cm) 2.5 cm   Wound Depth (cm) 0.1 cm   Wound Surface Area (cm^2) 3.75 cm^2   Change in Wound Size % 25   Wound Volume (cm^3) 0.375 cm^3   Wound Healing % 25   Wound Assessment Slough;Pink/red   Drainage Amount Moderate   Drainage Description Serous   Wound Odor None   Jennifer-Wound/Incision Assessment Edematous   Wound Thickness Description Full thickness   Wound Leg lower Lower;Right;Medial;Distal #2   Date First Assessed: 04/11/22   Primary Wound Type: Venous Ulcer  Location: Leg lower  Wound Location Orientation: Lower;Right;Medial;Distal  Wound Description: #2   Wound Image    Wound Etiology Venous   Dressing Status Old drainage noted   Cleansed Cleansed with saline   Dressing/Treatment ABD pad   Wound Length (cm) 1 cm   Wound Width (cm) 0.6 cm   Wound Depth (cm) 0.1 cm   Wound Surface Area (cm^2) 0.6 cm^2   Change in Wound Size % 31.82   Wound Volume (cm^3) 0.06 cm^3   Wound Healing % 66   Wound Assessment Pink/red   Drainage Amount Moderate   Drainage Description Serous   Wound Odor None   Jennifer-Wound/Incision Assessment Edematous   Wound Thickness Description Full thickness   Wound Leg lower Right;Posterior;Proximal #3   Date First Assessed: 04/11/22   Primary Wound Type: Venous  Location: Leg lower  Wound Location Orientation: Right;Posterior;Proximal  Wound Description: #3   Wound Image    Wound Etiology Venous   Dressing Status Old drainage noted   Cleansed Cleansed with saline   Dressing/Treatment ABD pad   Wound Length (cm) 1 cm   Wound Width (cm) 1.4 cm   Wound Depth (cm) 0.1 cm   Wound Surface Area (cm^2) 1.4 cm^2 Change in Wound Size % 22.22   Wound Volume (cm^3) 0.14 cm^3   Wound Healing % 22   Wound Assessment Pink/red   Drainage Amount Moderate   Drainage Description Serous   Wound Odor None   Jennifer-Wound/Incision Assessment Edematous   Wound Thickness Description Full thickness   Wound Leg Lower; Posterior;Right;Distal #4   Date First Assessed: 04/27/22   Primary Wound Type: Blister/bullae  Location: Leg  Wound Location Orientation: Lower; Posterior;Right;Distal  Wound Description: #4   Wound Image    Wound Etiology Venous   Dressing Status Old drainage noted   Cleansed Cleansed with saline   Dressing/Treatment ABD pad   Wound Length (cm) 2.2 cm   Wound Width (cm) 1.2 cm   Wound Depth (cm) 0.1 cm   Wound Surface Area (cm^2) 2.64 cm^2   Change in Wound Size % 14.29   Wound Volume (cm^3) 0.264 cm^3   Wound Healing % 14   Wound Assessment Pink/red   Drainage Amount Moderate   Drainage Description Serous   Wound Odor None   Jennifer-Wound/Incision Assessment Edematous   Wound Thickness Description Full thickness

## 2022-04-29 NOTE — WOUND CARE
[address]  [address], [address]  Phone: [phone]  Fax: [phone]    Patient: Sloane Baker MRN: [id number]  SSN: [id number]    YOB: 1935  Age: 81 y.o. Sex: female       Return Appointment: 1 week with Dr. Houston Show    Instructions: Right lower leg wounds:  Clean wound with saline. Apply zinc patches to wounds. Cover with ABD or exudry. Wrap right lower leg with 2 layer compression system. Home health to change dressing on Monday. Follow up in wound center on Thursday, May 5th. Do not get wrap wet in shower. May purchase cast cover from "[employer]" to keep dry in the shower. Should you experience increased redness, swelling, pain, foul odor, size of wound(s), or have a temperature over 101 degrees please contact the [address] [place] [address] Road at [phone] or if after hours contact your primary care physician or go to the hospital emergency department.     Signed By: Elisa Velazco RN     April 29, 2022

## 2022-04-30 PROCEDURE — 3331090002 HH PPS REVENUE DEBIT

## 2022-04-30 PROCEDURE — 3331090001 HH PPS REVENUE CREDIT

## 2022-05-01 PROCEDURE — 3331090002 HH PPS REVENUE DEBIT

## 2022-05-01 PROCEDURE — 3331090001 HH PPS REVENUE CREDIT

## 2022-05-02 ENCOUNTER — HOME CARE VISIT (OUTPATIENT)
Dept: SCHEDULING | Facility: HOME HEALTH | Age: 87
End: 2022-05-02
Payer: MEDICARE

## 2022-05-02 VITALS
SYSTOLIC BLOOD PRESSURE: 142 MMHG | OXYGEN SATURATION: 99 % | TEMPERATURE: 98.7 F | HEART RATE: 71 BPM | RESPIRATION RATE: 18 BRPM | DIASTOLIC BLOOD PRESSURE: 69 MMHG

## 2022-05-02 PROCEDURE — 3331090001 HH PPS REVENUE CREDIT

## 2022-05-02 PROCEDURE — 3331090002 HH PPS REVENUE DEBIT

## 2022-05-02 PROCEDURE — G0299 HHS/HOSPICE OF RN EA 15 MIN: HCPCS

## 2022-05-02 PROCEDURE — MED10312 ACCUWRAP LITE, 2 LAYER, COMPRESSION SYS

## 2022-05-03 PROCEDURE — 3331090002 HH PPS REVENUE DEBIT

## 2022-05-03 PROCEDURE — 3331090001 HH PPS REVENUE CREDIT

## 2022-05-04 ENCOUNTER — HOME CARE VISIT (OUTPATIENT)
Dept: SCHEDULING | Facility: HOME HEALTH | Age: 87
End: 2022-05-04
Payer: MEDICARE

## 2022-05-04 PROCEDURE — 3331090001 HH PPS REVENUE CREDIT

## 2022-05-04 PROCEDURE — G0299 HHS/HOSPICE OF RN EA 15 MIN: HCPCS

## 2022-05-04 PROCEDURE — 3331090002 HH PPS REVENUE DEBIT

## 2022-05-05 ENCOUNTER — HOSPITAL ENCOUNTER (OUTPATIENT)
Dept: WOUND CARE | Age: 87
Discharge: HOME | End: 2022-05-05
Attending: SURGERY
Payer: MEDICARE

## 2022-05-05 VITALS
BODY MASS INDEX: 30.04 KG/M2 | WEIGHT: 153 LBS | TEMPERATURE: 97.8 F | HEART RATE: 66 BPM | SYSTOLIC BLOOD PRESSURE: 168 MMHG | DIASTOLIC BLOOD PRESSURE: 73 MMHG | RESPIRATION RATE: 18 BRPM | HEIGHT: 60 IN

## 2022-05-05 VITALS
SYSTOLIC BLOOD PRESSURE: 154 MMHG | OXYGEN SATURATION: 98 % | TEMPERATURE: 98.9 F | DIASTOLIC BLOOD PRESSURE: 58 MMHG | HEART RATE: 82 BPM | RESPIRATION RATE: 20 BRPM

## 2022-05-05 DIAGNOSIS — I87.331 CHRONIC VENOUS HTN W ULCER AND INFLAMMATION OF R LOW EXTREM (HCC): Primary | ICD-10-CM

## 2022-05-05 DIAGNOSIS — L97.919 CHRONIC VENOUS HTN W ULCER AND INFLAMMATION OF R LOW EXTREM (HCC): Primary | ICD-10-CM

## 2022-05-05 PROCEDURE — 3331090002 HH PPS REVENUE DEBIT

## 2022-05-05 PROCEDURE — 3331090001 HH PPS REVENUE CREDIT

## 2022-05-05 PROCEDURE — MED10197 PASTE,CALAZIME, REMEDY IST PHYTO 4OZ

## 2022-05-05 PROCEDURE — MED11061 SALINE,.9%,15 ML,VIAL

## 2022-05-05 PROCEDURE — 29581 APPL MULTLAYER CMPRN SYS LEG: CPT

## 2022-05-05 PROCEDURE — A6210 FOAM DRG >16<=48 SQ IN W/O B: HCPCS

## 2022-05-05 PROCEDURE — 99213 OFFICE O/P EST LOW 20 MIN: CPT | Performed by: SURGERY

## 2022-05-05 RX ORDER — TRIAMCINOLONE ACETONIDE 1 MG/G
OINTMENT TOPICAL ONCE
OUTPATIENT
Start: 2022-05-05 | End: 2022-05-05

## 2022-05-05 RX ORDER — MUPIROCIN 20 MG/G
OINTMENT TOPICAL ONCE
OUTPATIENT
Start: 2022-05-05 | End: 2022-05-05

## 2022-05-05 RX ORDER — BACITRACIN 500 [USP'U]/G
OINTMENT TOPICAL ONCE
Status: CANCELLED | OUTPATIENT
Start: 2022-05-05 | End: 2022-05-05

## 2022-05-05 RX ORDER — MUPIROCIN 20 MG/G
OINTMENT TOPICAL ONCE
Status: CANCELLED | OUTPATIENT
Start: 2022-05-05 | End: 2022-05-05

## 2022-05-05 RX ORDER — LIDOCAINE 50 MG/G
OINTMENT TOPICAL ONCE
Status: CANCELLED | OUTPATIENT
Start: 2022-05-05 | End: 2022-05-05

## 2022-05-05 RX ORDER — LIDOCAINE HYDROCHLORIDE 40 MG/ML
SOLUTION TOPICAL ONCE
Status: CANCELLED | OUTPATIENT
Start: 2022-05-05 | End: 2022-05-05

## 2022-05-05 RX ORDER — SILVER SULFADIAZINE 10 G/1000G
CREAM TOPICAL ONCE
OUTPATIENT
Start: 2022-05-05 | End: 2022-05-05

## 2022-05-05 RX ORDER — CLOBETASOL PROPIONATE 0.5 MG/G
OINTMENT TOPICAL ONCE
OUTPATIENT
Start: 2022-05-05 | End: 2022-05-05

## 2022-05-05 RX ORDER — LIDOCAINE HYDROCHLORIDE 20 MG/ML
5 SOLUTION OROPHARYNGEAL ONCE
Status: CANCELLED | OUTPATIENT
Start: 2022-05-05 | End: 2022-05-05

## 2022-05-05 RX ORDER — LIDOCAINE 40 MG/G
CREAM TOPICAL ONCE
OUTPATIENT
Start: 2022-05-05 | End: 2022-05-05

## 2022-05-05 RX ORDER — LIDOCAINE HYDROCHLORIDE 20 MG/ML
JELLY TOPICAL ONCE
Status: CANCELLED | OUTPATIENT
Start: 2022-05-05 | End: 2022-05-05

## 2022-05-05 RX ORDER — TRIAMCINOLONE ACETONIDE 1 MG/G
OINTMENT TOPICAL ONCE
Status: CANCELLED | OUTPATIENT
Start: 2022-05-05 | End: 2022-05-05

## 2022-05-05 RX ORDER — LIDOCAINE 40 MG/G
CREAM TOPICAL ONCE
Status: CANCELLED | OUTPATIENT
Start: 2022-05-05 | End: 2022-05-05

## 2022-05-05 RX ORDER — BACITRACIN ZINC AND POLYMYXIN B SULFATE 500; 1000 [USP'U]/G; [USP'U]/G
OINTMENT TOPICAL ONCE
OUTPATIENT
Start: 2022-05-05 | End: 2022-05-05

## 2022-05-05 RX ORDER — LIDOCAINE 50 MG/G
OINTMENT TOPICAL ONCE
OUTPATIENT
Start: 2022-05-05 | End: 2022-05-05

## 2022-05-05 RX ORDER — BACITRACIN ZINC AND POLYMYXIN B SULFATE 500; 1000 [USP'U]/G; [USP'U]/G
OINTMENT TOPICAL ONCE
Status: CANCELLED | OUTPATIENT
Start: 2022-05-05 | End: 2022-05-05

## 2022-05-05 RX ORDER — LIDOCAINE HYDROCHLORIDE 40 MG/ML
SOLUTION TOPICAL ONCE
OUTPATIENT
Start: 2022-05-05 | End: 2022-05-05

## 2022-05-05 RX ORDER — BETAMETHASONE DIPROPIONATE 0.5 MG/G
OINTMENT TOPICAL ONCE
Status: CANCELLED | OUTPATIENT
Start: 2022-05-05 | End: 2022-05-05

## 2022-05-05 RX ORDER — CLOBETASOL PROPIONATE 0.5 MG/G
OINTMENT TOPICAL ONCE
Status: CANCELLED | OUTPATIENT
Start: 2022-05-05 | End: 2022-05-05

## 2022-05-05 RX ORDER — LIDOCAINE HYDROCHLORIDE 20 MG/ML
JELLY TOPICAL ONCE
OUTPATIENT
Start: 2022-05-05 | End: 2022-05-05

## 2022-05-05 RX ORDER — SILVER SULFADIAZINE 10 G/1000G
CREAM TOPICAL ONCE
Status: CANCELLED | OUTPATIENT
Start: 2022-05-05 | End: 2022-05-05

## 2022-05-05 RX ORDER — GENTAMICIN SULFATE 1 MG/G
OINTMENT TOPICAL ONCE
OUTPATIENT
Start: 2022-05-05 | End: 2022-05-05

## 2022-05-05 RX ORDER — GENTAMICIN SULFATE 1 MG/G
OINTMENT TOPICAL ONCE
Status: CANCELLED | OUTPATIENT
Start: 2022-05-05 | End: 2022-05-05

## 2022-05-05 RX ORDER — BETAMETHASONE DIPROPIONATE 0.5 MG/G
OINTMENT TOPICAL ONCE
OUTPATIENT
Start: 2022-05-05 | End: 2022-05-05

## 2022-05-05 RX ORDER — LIDOCAINE HYDROCHLORIDE 20 MG/ML
5 SOLUTION OROPHARYNGEAL ONCE
OUTPATIENT
Start: 2022-05-05 | End: 2022-05-05

## 2022-05-05 RX ORDER — BACITRACIN 500 [USP'U]/G
OINTMENT TOPICAL ONCE
OUTPATIENT
Start: 2022-05-05 | End: 2022-05-05

## 2022-05-05 NOTE — WOUND CARE
05/05/22 1343   Wound Leg Lower; Posterior;Right;Distal #4   Date First Assessed: 04/27/22   Primary Wound Type: Blister/bullae  Location: Leg  Wound Location Orientation: Lower; Posterior;Right;Distal  Wound Description: #4   Wound Image    Wound Etiology Venous   Dressing Status Clean;Dry; Intact   Cleansed Cleansed with saline   Dressing/Treatment Zinc paste;ABD pad  (2 layer wrap)   Wound Length (cm) 0 cm   Wound Width (cm) 0 cm   Wound Depth (cm) 0 cm   Wound Surface Area (cm^2) 0 cm^2   Change in Wound Size % 100   Wound Volume (cm^3) 0 cm^3   Wound Healing % 100   Wound Assessment Slough;Pink/red   Drainage Amount Moderate   Drainage Description Serosanguinous   Jennifer-Wound/Incision Assessment Edematous   Wound Thickness Description Full thickness   Wound Leg lower Lower;Right;Medial;Distal #2   Date First Assessed: 04/11/22   Primary Wound Type: Venous Ulcer  Location: Leg lower  Wound Location Orientation: Lower;Right;Medial;Distal  Wound Description: #2   Wound Image    Wound Etiology Venous   Dressing Status Clean;Dry; Intact   Cleansed Cleansed with saline   Dressing/Treatment Zinc paste;ABD pad  (2 layer wrap)   Wound Length (cm) 0.3 cm   Wound Width (cm) 0.3 cm   Wound Depth (cm) 0.1 cm   Wound Surface Area (cm^2) 0.09 cm^2   Change in Wound Size % 89.77   Wound Volume (cm^3) 0.009 cm^3   Wound Healing % 95   Wound Assessment Epithelialization   Drainage Amount Scant   Drainage Description Serosanguinous   Wound Thickness Description Full thickness   Wound Leg lower Right;Posterior;Proximal #3   Date First Assessed: 04/11/22   Primary Wound Type: Venous  Location: Leg lower  Wound Location Orientation: Right;Posterior;Proximal  Wound Description: #3   Wound Image    Wound Etiology Venous   Cleansed Cleansed with saline   Dressing/Treatment Zinc paste;ABD pad  (2 layer)   Wound Length (cm) 1 cm   Wound Width (cm) 1 cm   Wound Depth (cm) 0.1 cm   Wound Surface Area (cm^2) 1 cm^2   Change in Wound Size % 44.44   Wound Volume (cm^3) 0.1 cm^3   Wound Healing % 44   Wound Assessment Slough;Pink/red   Drainage Amount Moderate   Drainage Description Serosanguinous   Wound Odor None   Jennifer-Wound/Incision Assessment Edematous   Wound Thickness Description Full thickness   Wound Leg lower Right;Medial;Proximal #1   Date First Assessed: 04/08/22   Primary Wound Type: (c) Venous  Location: Leg lower  Wound Location Orientation: Right;Medial;Proximal  Wound Description: #1   Wound Image    Wound Etiology Venous   Dressing Status Clean;Dry; Intact   Cleansed Cleansed with saline   Dressing/Treatment Zinc paste;ABD pad  (2 layer wrap)   Wound Length (cm) 1.1 cm   Wound Width (cm) 2 cm   Wound Depth (cm) 0.1 cm   Wound Surface Area (cm^2) 2.2 cm^2   Change in Wound Size % 56   Wound Volume (cm^3) 0.22 cm^3   Wound Healing % 56   Wound Assessment Slough;Granulation tissue   Drainage Amount Moderate   Drainage Description Serosanguinous   Wound Thickness Description Full thickness

## 2022-05-05 NOTE — WOUND CARE
Multilayer Compression Wrap   (Not Unna) Below the Knee    NAME:  Samuel Camejo  YOB: 1935  MEDICAL RECORD NUMBER:  [id number]  DATE:  5/5/2022    Removed old Multilayer wrap if indicated and wash leg with mild soap/water. Applied primary and secondary dressing as ordered. Applied multilayered dressing below the knee to right lower leg. Instructed patient/caregiver not to remove dressing and to keep it clean and dry. Instructed patient/caregiver on complications to report to provider, such as pain, numbness in toes, heavy drainage, and slippage of dressing. Instructed patient on purpose of compression dressing and on activity and exercise recommendations.     Response to treatment: Well tolerated by patient       Electronically signed by Td Doan on 5/5/2022 at 4:42 PM

## 2022-05-05 NOTE — PROGRESS NOTES
[address], [address], [address]  [phone]    Progress Notes   Hugo Corley       MRN: [id number]     : 1935     Age: 81 y.o. Subjective/HPI:   This patient is a 81 y.o. female seen and evaluated at the wound clinic for follow-up of venous ulcer of the right lower extremity. Patient was last seen by Dr Keeley Campos and has been receiving dressing changes with zinc patch and 2 layer compression wrap. She reports an occasional burning sensation in her right lower extremity but otherwise has been doing well. Drainage is much improved compared to before. She has not noted any erythema or purulence nor has she had any fever. She is able to ambulate with the use of a walker but spends most of her time in a wheelchair. Review of Systems  A comprehensive review of systems was negative except for that written in the HPI. Past Medical History:   Diagnosis Date    Arthritis     Cancer [facility])       Past Surgical History:   Procedure Laterality Date    HX ACL RECONSTRUCTION      HX AMPUTATION TOE  2021    pt had a hangmail which infected    HX CATARACT REMOVAL Left     HX CHOLECYSTECTOMY      HX CYSTECTOMY  2008    Bladder removal wears a bag    HX GYN      HX ORTHOPAEDIC      HX TOTAL VAGINAL HYSTERECTOMY        Allergies   Allergen Reactions    Penicillins Rash      Social History     Tobacco Use    Smoking status: Never Smoker    Smokeless tobacco: Never Used   Substance Use Topics    Alcohol use: No      Social History     Social History Narrative    Not on file     Family History   Problem Relation Age of Onset    Heart Disease Father     Diabetes Sister     Heart Disease Sister       Cannot display prior to admission medications because the patient has not been admitted in this contact.      Current Outpatient Medications   Medication Sig    atenoloL (TENORMIN) 50 mg tablet TAKE 2 TABLETS BY MOUTH DAILY WITH BREAKFAST  losartan (COZAAR) 50 mg tablet Take 1 Tablet by mouth daily.  metFORMIN ER (GLUCOPHAGE XR) 500 mg tablet Take 1 Tablet by mouth two (2) times a day.  furosemide (LASIX) 20 mg tablet Take 1 Tablet by mouth daily.  triamcinolone acetonide (KENALOG) 0.1 % topical cream Apply  to affected area two (2) times a day. use thin layer (Patient taking differently: Apply  to affected area two (2) times a day. use thin layer on RLE)    Blood-Glucose Meter monitoring kit Check sugar once daily, dx diabetes mellitus, uncomplicated    multivitamin, tx-iron-ca-min (THERA-M W/ IRON) 9 mg iron-400 mcg tab tablet Take 1 Tab by mouth daily.  acetaminophen (TYLENOL EXTRA STRENGTH) 500 mg tablet Take 500 mg by mouth every six (6) hours as needed for Pain.  Calcium-Cholecalciferol, D3, (CALCIUM 600 WITH VITAMIN D3) 600 mg(1,500mg) -400 unit chew Take 1 Tablet by mouth daily.  B.infantis-B.ani-B.long-B.bifi (PROBIOTIC 4X) 10-15 mg TbEC Take 1 Tablet by mouth daily. No current facility-administered medications for this encounter. Objective:     Vitals:    05/05/22 1315 05/05/22 1331   BP:  (!) 168/73   Pulse:  66   Resp:  18   Temp:  97.8 °F (36.6 °C)   Weight: 69.4 kg (153 lb)    Height: 5' 0.02\" (1.525 m)        Physical Exam:  Ambulation: Walker  Gen- the patient is well developed and in no acute distress  HEENT- no focal abnormalities of the scalp or face. Neck- no JVD or retractions  Lungs- normal respiratory effort. Cardiovascular:  Lower limb pulses: 1+. Abd- soft and no masses evident. Ext- warm without cyanosis. There is mild lower leg edema that appears well controlled. Skin- no jaundice or rashes. Venous ulcer medial right lower extremity with slough in the base of the wound, measurements slightly improved. Please see the specific measurements and descriptions of the wound(s) below. There is no evidence of periwound induration or warmth. No purulence or odor.    Neuro- alert and oriented x 3. No gross imotor deficits are present. Lower limb sensation is intact. Psychological: Affect normal. Mood normal. Memory intact.      Wound Leg lower Right;Medial;Proximal #1 (Active)   Wound Image   04/29/22 1032   Wound Etiology Venous 04/29/22 1032   Dressing Status Old drainage noted 04/29/22 1032   Cleansed Cleansed with saline 04/29/22 1032   Dressing/Treatment ABD pad 04/29/22 1032   Wound Length (cm) 1.5 cm 04/29/22 1032   Wound Width (cm) 2.5 cm 04/29/22 1032   Wound Depth (cm) 0.1 cm 04/29/22 1032   Wound Surface Area (cm^2) 3.75 cm^2 04/29/22 1032   Change in Wound Size % 25 04/29/22 1032   Wound Volume (cm^3) 0.375 cm^3 04/29/22 1032   Wound Healing % 25 04/29/22 1032   Wound Assessment Slough;[place]/red 04/29/22 1032   Drainage Amount Moderate 04/29/22 1032   Drainage Description Serous 04/29/22 1032   Wound Odor None 04/29/22 1032   Jennifer-Wound/Incision Assessment Edematous 04/29/22 1032   Wound Thickness Description Full thickness 04/29/22 1032   Number of days: 27       Wound Leg lower Lower;Right;Medial;Distal #2 (Active)   Wound Image   04/29/22 1032   Wound Etiology Venous 04/29/22 1032   Dressing Status Old drainage noted 04/29/22 1032   Cleansed Cleansed with saline 04/29/22 1032   Dressing/Treatment ABD pad 04/29/22 1032   Wound Length (cm) 1 cm 04/29/22 1032   Wound Width (cm) 0.6 cm 04/29/22 1032   Wound Depth (cm) 0.1 cm 04/29/22 1032   Wound Surface Area (cm^2) 0.6 cm^2 04/29/22 1032   Change in Wound Size % 31.82 04/29/22 1032   Wound Volume (cm^3) 0.06 cm^3 04/29/22 1032   Wound Healing % 66 04/29/22 1032   Wound Assessment Pink/red 04/29/22 1032   Drainage Amount Moderate 04/29/22 1032   Drainage Description Serous 04/29/22 1032   Wound Odor None 04/29/22 1032   Jennifer-Wound/Incision Assessment Edematous 04/29/22 1032   Wound Thickness Description Full thickness 04/29/22 1032   Number of days: 24       Wound Leg lower Right;Posterior;Proximal #3 (Active)   Wound Image   04/29/22 1032 Wound Etiology Venous 04/29/22 1032   Dressing Status Old drainage noted 04/29/22 1032   Cleansed Cleansed with saline 04/29/22 1032   Dressing/Treatment ABD pad 04/29/22 1032   Wound Length (cm) 1 cm 04/29/22 1032   Wound Width (cm) 1.4 cm 04/29/22 1032   Wound Depth (cm) 0.1 cm 04/29/22 1032   Wound Surface Area (cm^2) 1.4 cm^2 04/29/22 1032   Change in Wound Size % 22.22 04/29/22 1032   Wound Volume (cm^3) 0.14 cm^3 04/29/22 1032   Wound Healing % 22 04/29/22 1032   Wound Assessment Pink/red 04/29/22 1032   Drainage Amount Moderate 04/29/22 1032   Drainage Description Serous 04/29/22 1032   Wound Odor None 04/29/22 1032   Jennifer-Wound/Incision Assessment Edematous 04/29/22 1032   Wound Thickness Description Full thickness 04/29/22 1032   Number of days: 24       Wound Leg Lower; Posterior;Right;Distal #4 (Active)   Wound Image   04/29/22 1032   Wound Etiology Venous 04/29/22 1032   Dressing Status Old drainage noted 04/29/22 1032   Cleansed Cleansed with saline 04/29/22 1032   Dressing/Treatment ABD pad 04/29/22 1032   Wound Length (cm) 2.2 cm 04/29/22 1032   Wound Width (cm) 1.2 cm 04/29/22 1032   Wound Depth (cm) 0.1 cm 04/29/22 1032   Wound Surface Area (cm^2) 2.64 cm^2 04/29/22 1032   Change in Wound Size % 14.29 04/29/22 1032   Wound Volume (cm^3) 0.264 cm^3 04/29/22 1032   Wound Healing % 14 04/29/22 1032   Wound Assessment Pink/red 04/29/22 1032   Drainage Amount Moderate 04/29/22 1032   Drainage Description Serous 04/29/22 1032   Wound Odor None 04/29/22 1032   Jennifer-Wound/Incision Assessment Edematous 04/29/22 1032   Wound Thickness Description Full thickness 04/29/22 1032   Number of days: 8        Assessment:   Venous ulcer right lower extremity with increased slough  Patient Active Problem List   Diagnosis Code    Essential hypertension I10    Type 2 diabetes mellitus with skin complication, without long-term current use of insulin ([facility] [address] E11.628    Arthritis M19.90    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 Follow-up in 2 weeks for recheck. Follow-up Information    None           Thank you very much for the opportunity to participate in this patient's care. Signed By: Maykel Alcantar MD     May 5, 2022        TIME:  If total time for today's E/M service is used for level of service it is documented below. This time includes physician non-face-to-face service time visit on the date of service and includes    Preparing to see the patient (eg, review of tests)  Obtaining and/or reviewing separately obtained history  Performing a medically necessary appropriate examination and/or evaluation  Counseling and educating the patient/family/caregiver  Ordering medications, tests, or procedures  Referring and communicating with other health care professionals as needed  Documenting clinical information in the electronic or other health record  Independently interpreting results (not reported separately) and communicating results to the patient/family/caregiver  Care coordination (not reported separately)    E/M time =       20   minutes.

## 2022-05-05 NOTE — WOUND CARE
[address]  [address], [address]   Phone: [phone]  Fax: [phone]    Patient: Ria Allan MRN: [id number]  SSN: [id number]    YOB: 1935  Age: 81 y.o. Sex: female       Return Appointment: 2 weeks with Hector Fermin MD    Instructions: Right lower leg wounds:  Clean wound with saline. Arlyss Leader Blue: Cut to wound size, moisten with saline, and apply to wound bed. Cover with ABD or exudry. Apply zinc cream to periwound. Wrap right lower leg with 2 layer compression system. Dressings to be changed on Monday and Thursday. Home health to change dressing 1 to 2x weekly       Do not get wrap wet in shower. May purchase cast cover from [employer] to keep dry in the shower. Should you experience increased redness, swelling, pain, foul odor, size of wound(s), or have a temperature over 101 degrees please contact the [address] [place] [address] Road at [phone] or if after hours contact your primary care physician or go to the hospital emergency department.     Signed By: Donnell Baugh     May 5, 2022

## 2022-05-05 NOTE — DISCHARGE INSTRUCTIONS
[address]  [address], [address]  Phone: [phone]  Fax: [phone]    Patient: Aretha Fisher MRN: [id number]  SSN: [id number]    YOB: 1935  Age: 81 y.o. Sex: female       Return Appointment: 2 weeks with Hunter Vaughan MD    Instructions:  Right lower leg wounds:  Clean wound with saline. Elveria Lied Blue: Cut to wound size, moisten with saline, and apply to wound bed. Cover with ABD or exudry. Apply zinc cream to periwound. Wrap right lower leg with 2 layer compression system. Dressings to be changed on Monday and Thursday.     Home health to change dressing 1 to 2x weekly       Should you experience increased redness, swelling, pain, foul odor, size of wound(s), or have a temperature over 101 degrees please contact the [address] [place] [address] Road at [phone] or if after hours contact your primary care physician or go to the hospital emergency department.     Signed By: Derrick Troncoso     May 5, 2022

## 2022-05-06 PROCEDURE — 3331090002 HH PPS REVENUE DEBIT

## 2022-05-06 PROCEDURE — 3331090001 HH PPS REVENUE CREDIT

## 2022-05-07 PROCEDURE — 3331090002 HH PPS REVENUE DEBIT

## 2022-05-07 PROCEDURE — 3331090001 HH PPS REVENUE CREDIT

## 2022-05-08 PROCEDURE — 3331090001 HH PPS REVENUE CREDIT

## 2022-05-08 PROCEDURE — 3331090002 HH PPS REVENUE DEBIT

## 2022-05-09 ENCOUNTER — HOME CARE VISIT (OUTPATIENT)
Dept: SCHEDULING | Facility: HOME HEALTH | Age: 87
End: 2022-05-09
Payer: MEDICARE

## 2022-05-09 VITALS
HEART RATE: 66 BPM | RESPIRATION RATE: 18 BRPM | DIASTOLIC BLOOD PRESSURE: 77 MMHG | TEMPERATURE: 98.4 F | SYSTOLIC BLOOD PRESSURE: 139 MMHG | OXYGEN SATURATION: 100 %

## 2022-05-09 PROCEDURE — 400013 HH SOC

## 2022-05-09 PROCEDURE — 3331090001 HH PPS REVENUE CREDIT

## 2022-05-09 PROCEDURE — G0299 HHS/HOSPICE OF RN EA 15 MIN: HCPCS

## 2022-05-09 PROCEDURE — 3331090002 HH PPS REVENUE DEBIT

## 2022-05-10 PROCEDURE — 3331090001 HH PPS REVENUE CREDIT

## 2022-05-10 PROCEDURE — 3331090002 HH PPS REVENUE DEBIT

## 2022-05-11 PROCEDURE — 3331090002 HH PPS REVENUE DEBIT

## 2022-05-11 PROCEDURE — 3331090001 HH PPS REVENUE CREDIT

## 2022-05-12 ENCOUNTER — HOME CARE VISIT (OUTPATIENT)
Dept: SCHEDULING | Facility: HOME HEALTH | Age: 87
End: 2022-05-12
Payer: MEDICARE

## 2022-05-12 VITALS
HEART RATE: 68 BPM | TEMPERATURE: 98.1 F | SYSTOLIC BLOOD PRESSURE: 122 MMHG | OXYGEN SATURATION: 99 % | DIASTOLIC BLOOD PRESSURE: 62 MMHG | RESPIRATION RATE: 16 BRPM

## 2022-05-12 PROCEDURE — 3331090002 HH PPS REVENUE DEBIT

## 2022-05-12 PROCEDURE — 3331090001 HH PPS REVENUE CREDIT

## 2022-05-12 PROCEDURE — A6216 NON-STERILE GAUZE<=16 SQ IN: HCPCS

## 2022-05-12 PROCEDURE — G0299 HHS/HOSPICE OF RN EA 15 MIN: HCPCS

## 2022-05-12 PROCEDURE — A9270 NON-COVERED ITEM OR SERVICE: HCPCS

## 2022-05-12 PROCEDURE — A6210 FOAM DRG >16<=48 SQ IN W/O B: HCPCS

## 2022-05-13 PROCEDURE — 3331090002 HH PPS REVENUE DEBIT

## 2022-05-13 PROCEDURE — 3331090001 HH PPS REVENUE CREDIT

## 2022-05-14 PROCEDURE — 3331090001 HH PPS REVENUE CREDIT

## 2022-05-14 PROCEDURE — 3331090002 HH PPS REVENUE DEBIT

## 2022-05-15 PROCEDURE — 3331090002 HH PPS REVENUE DEBIT

## 2022-05-15 PROCEDURE — 3331090001 HH PPS REVENUE CREDIT

## 2022-05-16 ENCOUNTER — HOME CARE VISIT (OUTPATIENT)
Dept: SCHEDULING | Facility: HOME HEALTH | Age: 87
End: 2022-05-16
Payer: MEDICARE

## 2022-05-16 VITALS
RESPIRATION RATE: 18 BRPM | TEMPERATURE: 98.6 F | OXYGEN SATURATION: 100 % | HEART RATE: 74 BPM | SYSTOLIC BLOOD PRESSURE: 129 MMHG | DIASTOLIC BLOOD PRESSURE: 60 MMHG

## 2022-05-16 PROCEDURE — G0299 HHS/HOSPICE OF RN EA 15 MIN: HCPCS

## 2022-05-16 PROCEDURE — 3331090002 HH PPS REVENUE DEBIT

## 2022-05-16 PROCEDURE — 3331090001 HH PPS REVENUE CREDIT

## 2022-05-17 PROCEDURE — 3331090002 HH PPS REVENUE DEBIT

## 2022-05-17 PROCEDURE — 3331090001 HH PPS REVENUE CREDIT

## 2022-05-18 PROCEDURE — 3331090002 HH PPS REVENUE DEBIT

## 2022-05-18 PROCEDURE — 3331090001 HH PPS REVENUE CREDIT

## 2022-05-19 ENCOUNTER — HOME CARE VISIT (OUTPATIENT)
Dept: SCHEDULING | Facility: HOME HEALTH | Age: 87
End: 2022-05-19
Payer: MEDICARE

## 2022-05-19 PROCEDURE — 3331090002 HH PPS REVENUE DEBIT

## 2022-05-19 PROCEDURE — 3331090001 HH PPS REVENUE CREDIT

## 2022-05-20 ENCOUNTER — HOSPITAL ENCOUNTER (OUTPATIENT)
Dept: WOUND CARE | Age: 87
Discharge: HOME | End: 2022-05-20
Attending: SURGERY
Payer: MEDICARE

## 2022-05-20 VITALS
RESPIRATION RATE: 18 BRPM | HEIGHT: 60 IN | TEMPERATURE: 97 F | SYSTOLIC BLOOD PRESSURE: 173 MMHG | HEART RATE: 62 BPM | DIASTOLIC BLOOD PRESSURE: 80 MMHG | WEIGHT: 152 LBS | BODY MASS INDEX: 29.84 KG/M2

## 2022-05-20 DIAGNOSIS — I89.0 LYMPHEDEMA: ICD-10-CM

## 2022-05-20 DIAGNOSIS — L97.919 CHRONIC VENOUS HTN W ULCER AND INFLAMMATION OF R LOW EXTREM (HCC): ICD-10-CM

## 2022-05-20 DIAGNOSIS — I87.331 CHRONIC VENOUS HTN W ULCER AND INFLAMMATION OF R LOW EXTREM (HCC): ICD-10-CM

## 2022-05-20 DIAGNOSIS — E11.622 TYPE 2 DIABETES MELLITUS WITH OTHER SKIN ULCER, WITHOUT LONG-TERM CURRENT USE OF INSULIN (HCC): ICD-10-CM

## 2022-05-20 DIAGNOSIS — I87.2 VENOUS STASIS DERMATITIS OF RIGHT LOWER EXTREMITY: ICD-10-CM

## 2022-05-20 PROCEDURE — 29581 APPL MULTLAYER CMPRN SYS LEG: CPT

## 2022-05-20 PROCEDURE — 3331090002 HH PPS REVENUE DEBIT

## 2022-05-20 PROCEDURE — 99214 OFFICE O/P EST MOD 30 MIN: CPT | Performed by: SURGERY

## 2022-05-20 PROCEDURE — 3331090001 HH PPS REVENUE CREDIT

## 2022-05-20 NOTE — WOUND CARE
05/20/22 1145   Wound Leg lower Lower;Right;Medial;Distal #2   Date First Assessed: 04/11/22   Primary Wound Type: Venous Ulcer  Location: Leg lower  Wound Location Orientation: Lower;Right;Medial;Distal  Wound Description: #2   Wound Image    Wound Etiology Venous   Dressing Status Clean;Dry; Intact   Cleansed Cleansed with saline   Dressing/Treatment Zinc paste;ABD pad   Wound Length (cm) 0.3 cm   Wound Width (cm) 0.3 cm   Wound Depth (cm) 0.1 cm   Wound Surface Area (cm^2) 0.09 cm^2   Change in Wound Size % 89.77   Wound Volume (cm^3) 0.009 cm^3   Wound Healing % 95   Wound Assessment Epithelialization   Drainage Amount Scant   Drainage Description Serosanguinous   Wound Odor None   Jennifer-Wound/Incision Assessment Edematous   Wound Thickness Description Full thickness   Wound Leg lower Right;Posterior;Proximal #3   Date First Assessed: 04/11/22   Primary Wound Type: Venous  Location: Leg lower  Wound Location Orientation: Right;Posterior;Proximal  Wound Description: #3   Wound Etiology Venous   Dressing Status Old drainage noted   Cleansed Cleansed with saline   Dressing/Treatment Zinc paste;ABD pad   Wound Length (cm) 0.3 cm   Wound Width (cm) 0.3 cm   Wound Depth (cm) 0.1 cm   Wound Surface Area (cm^2) 0.09 cm^2   Change in Wound Size % 95   Wound Volume (cm^3) 0.009 cm^3   Wound Healing % 95   Wound Assessment Epithelialization   Drainage Amount Small   Drainage Description Serosanguinous   Wound Odor None   Jennifer-Wound/Incision Assessment Edematous   Wound Thickness Description Full thickness   Wound Leg lower Right;Medial;Proximal #1   Date First Assessed: 04/08/22   Primary Wound Type: (c) Venous  Location: Leg lower  Wound Location Orientation: Right;Medial;Proximal  Wound Description: #1   Wound Etiology Venous   Dressing Status Clean;Dry; Intact   Cleansed Cleansed with saline   Dressing/Treatment Zinc paste;ABD pad   Wound Length (cm) 0.9 cm   Wound Width (cm) 1.9 cm   Wound Depth (cm) 0.1 cm   Wound Surface Area (cm^2) 1.71 cm^2   Change in Wound Size % 65.8   Wound Volume (cm^3) 0.171 cm^3   Wound Healing % 66   Wound Assessment Slough;Granulation tissue   Drainage Amount Moderate   Drainage Description Serosanguinous   Wound Odor None   Jennifer-Wound/Incision Assessment Edematous   Wound Thickness Description Full thickness

## 2022-05-20 NOTE — PROGRESS NOTES
[address] [place] [address]  Consult Note/H&P/Progress Note      [address] RECORD NUMBER:  [id number]  AGE: 81 y.o. RACE WHITE/NON-  GENDER: female  : 1935  EPISODE DATE:  2022       Subjective:      CC (Chief Complaint) and HISTORY of PRESENT ILLNESS (HPI):    Mckenna Shay is a 81 y.o. female who presents today for wound/ulcer evaluation. Her first visit to the wound center with us was on 2022  She reported progressive ulcerations and blistering of her right lower extremity since approximately 2021. Nothing in particular made her condition better or worse. She associated the condition with use of a venous thrombosis pump which was placed on her leg in the summer 2021.   No associated fevers    She has been diabetic for approximately 20 years       This information was obtained from the patient  The following HPI elements were documented for the patient's wound:  Location: Right lower extremity ulcers  Duration: Since approximately 2021  Severity: Moderate severity  Context: History of diabetes mellitus for 20 years  Modifying Factors:  Nothing makes better or worse  Quality: No reported history of DVT  Timing:     Associated Signs and Symptoms: No fevers      Ulcer Identification:  Ulcer Type: venous    Contributing Factors: venous stasis, lymphedema and diabetes            PAST MEDICAL HISTORY  Past Medical History:   Diagnosis Date    Arthritis     Cancer ([address]     Diabetes ([address]     Hypertension         PAST SURGICAL HISTORY  Past Surgical History:   Procedure Laterality Date    HX ACL RECONSTRUCTION      HX AMPUTATION TOE  2021    pt had a hangmail which infected    HX CATARACT REMOVAL Left     HX CHOLECYSTECTOMY      HX CYSTECTOMY  2008    Bladder removal wears a bag    HX GYN      HX ORTHOPAEDIC      HX TOTAL VAGINAL HYSTERECTOMY         FAMILY HISTORY  Family History   Problem Relation Age of Onset    Heart Disease Father     Diabetes Sister     Heart Disease Sister        SOCIAL HISTORY  Social History     Tobacco Use    Smoking status: Never Smoker    Smokeless tobacco: Never Used   Vaping Use    Vaping Use: Never used   Substance Use Topics    Alcohol use: No    Drug use: No       ALLERGIES  Allergies   Allergen Reactions    Penicillins Rash       MEDICATIONS  Current Outpatient Medications on File Prior to Encounter   Medication Sig Dispense Refill    atenoloL (TENORMIN) 50 mg tablet TAKE 2 TABLETS BY MOUTH DAILY WITH BREAKFAST 180 Tablet 0    losartan (COZAAR) 50 mg tablet Take 1 Tablet by mouth daily. 90 Tablet 0    metFORMIN ER (GLUCOPHAGE XR) 500 mg tablet Take 1 Tablet by mouth two (2) times a day. 180 Tablet 3    furosemide (LASIX) 20 mg tablet Take 1 Tablet by mouth daily. 30 Tablet 3    triamcinolone acetonide (KENALOG) 0.1 % topical cream Apply  to affected area two (2) times a day. use thin layer (Patient taking differently: Apply  to affected area two (2) times a day. use thin layer on RLE) 453.6 g 0    Blood-Glucose Meter monitoring kit Check sugar once daily, dx diabetes mellitus, uncomplicated 1 Kit 0    multivitamin, tx-iron-ca-min (THERA-M W/ IRON) 9 mg iron-400 mcg tab tablet Take 1 Tab by mouth daily.  acetaminophen (TYLENOL EXTRA STRENGTH) 500 mg tablet Take 500 mg by mouth every six (6) hours as needed for Pain.  Calcium-Cholecalciferol, D3, (CALCIUM 600 WITH VITAMIN D3) 600 mg(1,500mg) -400 unit chew Take 1 Tablet by mouth daily.  B.infantis-B.ani-B.long-B.bifi (PROBIOTIC 4X) 10-15 mg TbEC Take 1 Tablet by mouth daily. No current facility-administered medications on file prior to encounter. REVIEW OF SYSTEMS  The patient has no difficulty with chest pain or shortness of breath. No fever or chills. Comprehensive review of systems was otherwise unremarkable except as noted above.       Objective:   Physical Exam:   Recent vitals (if inpt):  Patient Vitals for the past 24 hrs:   BP Temp Pulse Resp Height Weight   05/20/22 1145 (!) 173/80 97 °F (36.1 °C) 62 18 5' 0.02\" (1.525 m) 152 lb (68.9 kg)       Visit Vitals  BP (!) 173/80 (BP 1 Location: Right upper arm, BP Patient Position: At rest)   Pulse 62   Temp 97 °F (36.1 °C)   Resp 18   Ht 5' 0.02\" (1.525 m)   Wt 152 lb (68.9 kg)   BMI 29.67 kg/m²     Wt Readings from Last 3 Encounters:   05/20/22 152 lb (68.9 kg)   05/05/22 153 lb (69.4 kg)   04/29/22 154 lb (69.9 kg)     Constitutional: Alert, oriented, cooperative patient in no acute distress; appears stated age;  General appearance is within normal limits for wound care patient population. See the today's recorded vitals signs and constitutional data. Eyes: Pupils equal; Sclera are clear. EOMs intact; The eyes appear to track and move normally. The sclera are not injected. The conjunctive are clear. The eyelids are normal. There is no scleral icterus. ENMT: There are no obvious external ear, nose, lip or mouth lesions. Nares normal; Neck: Overall contour of the neck is normal with no obvious neck masses. Gross hearing is within normal limits. No obvious neck masses  Resp:  Breathing is non-labored; normal rate and effort; no audible wheezing. CV: RRR;  no JVD; No evidence of cyanosis of the upper extremities. The extremities are perfused without embolic sign, splinter hemorrhages, or petechia. GI: soft and non-distended without acute abnormality noted. Musculoskeletal: unremarkable with normal function. No embolic signs or cyanosis. Neuro:  Oriented; moves all 4; no focal deficits  Psychiatric: Judgement and insight are within normal limits for the wound care population of patients. Patient is oriented to person, place, and time. Recent and remote memory are within normal limits. Mood and affect are within normal limits. Integumentary (Skin/Wounds)  See inspection of wound(s) below.  There are no other skin areas of palpable concern. See wound center documentation including photos in the [address] Wound Template made part of this record by reference. Wounds:  Wound Leg lower Right;Medial;Proximal #1 (Active)   Wound Image   05/05/22 1343   Wound Etiology Venous 05/20/22 1145   Dressing Status Clean;Dry; Intact 05/20/22 1145   Cleansed Cleansed with saline 05/20/22 1145   Dressing/Treatment Zinc paste;ABD pad 05/20/22 1145   Wound Length (cm) 0.9 cm 05/20/22 1145   Wound Width (cm) 1.9 cm 05/20/22 1145   Wound Depth (cm) 0.1 cm 05/20/22 1145   Wound Surface Area (cm^2) 1.71 cm^2 05/20/22 1145   Change in Wound Size % 65.8 05/20/22 1145   Wound Volume (cm^3) 0.171 cm^3 05/20/22 1145   Wound Healing % 66 05/20/22 1145   Wound Assessment Slough;Granulation tissue 05/20/22 1145   Drainage Amount Moderate 05/20/22 1145   Drainage Description Serosanguinous 05/20/22 1145   Wound Odor None 05/20/22 1145   Jennifer-Wound/Incision Assessment Edematous 05/20/22 1145   Wound Thickness Description Full thickness 05/20/22 1145   Number of days: 42       Wound Leg lower Lower;Right;Medial;Distal #2 (Active)   Wound Image   05/20/22 1145   Wound Etiology Venous 05/20/22 1145   Dressing Status Clean;Dry; Intact 05/20/22 1145   Cleansed Cleansed with saline 05/20/22 1145   Dressing/Treatment Zinc paste;ABD pad 05/20/22 1145   Wound Length (cm) 0.3 cm 05/20/22 1145   Wound Width (cm) 0.3 cm 05/20/22 1145   Wound Depth (cm) 0.1 cm 05/20/22 1145   Wound Surface Area (cm^2) 0.09 cm^2 05/20/22 1145   Change in Wound Size % 89.77 05/20/22 1145   Wound Volume (cm^3) 0.009 cm^3 05/20/22 1145   Wound Healing % 95 05/20/22 1145   Wound Assessment Epithelialization 05/20/22 1145   Drainage Amount Scant 05/20/22 1145   Drainage Description Serosanguinous 05/20/22 1145   Wound Odor None 05/20/22 1145   Jennifer-Wound/Incision Assessment Edematous 05/20/22 1145   Wound Thickness Description Full thickness 05/20/22 1145   Number of days: 39 Wound Leg lower Right;Posterior;Proximal #3 (Active)   Wound Image   05/05/22 1343   Wound Etiology Venous 05/20/22 1145   Dressing Status Old drainage noted 05/20/22 1145   Cleansed Cleansed with saline 05/20/22 1145   Dressing/Treatment Zinc paste;ABD pad 05/20/22 1145   Wound Length (cm) 0.3 cm 05/20/22 1145   Wound Width (cm) 0.3 cm 05/20/22 1145   Wound Depth (cm) 0.1 cm 05/20/22 1145   Wound Surface Area (cm^2) 0.09 cm^2 05/20/22 1145   Change in Wound Size % 95 05/20/22 1145   Wound Volume (cm^3) 0.009 cm^3 05/20/22 1145   Wound Healing % 95 05/20/22 1145   Wound Assessment Epithelialization 05/20/22 1145   Drainage Amount Small 05/20/22 1145   Drainage Description Serosanguinous 05/20/22 1145   Wound Odor None 05/20/22 1145   Jennifer-Wound/Incision Assessment Edematous 05/20/22 1145   Wound Thickness Description Full thickness 05/20/22 1145   Number of days: 39       [REMOVED] Wound Leg Lower; Posterior;Right;Distal #4 (Removed)   Wound Image   05/05/22 1343   Wound Etiology Venous 05/05/22 1343   Dressing Status Clean;Dry; Intact 05/05/22 1343   Cleansed Cleansed with saline 05/05/22 1343   Dressing/Treatment Zinc paste;ABD pad 05/05/22 1343   Wound Length (cm) 0 cm 05/05/22 1343   Wound Width (cm) 0 cm 05/05/22 1343   Wound Depth (cm) 0 cm 05/05/22 1343   Wound Surface Area (cm^2) 0 cm^2 05/05/22 1343   Change in Wound Size % 100 05/05/22 1343   Wound Volume (cm^3) 0 cm^3 05/05/22 1343   Wound Healing % 100 05/05/22 1343   Wound Assessment Slough;Pink/red 05/05/22 1343   Drainage Amount Moderate 05/05/22 1343   Drainage Description Serosanguinous 05/05/22 1343   Wound Odor None 04/29/22 1032   Jennifer-Wound/Incision Assessment Edematous 05/05/22 1343   Wound Thickness Description Full thickness 05/05/22 1343   Number of days: 12                              Amount and/or Complexity of Data Reviewed and Analyzed:  I reviewed and analyzed all of the unique labs and radiologic studies that are shown below as well as any that are in the HPI, and any that are in the expanded problem list below  *Each unique test, order, or document contributes to the combination of 2 or combination of 3 in Category 1 below. I also independently reviewed radiology images for studies I described above in the HPI or studies I have ordered. For this visit I also reviewed old records and prior notes. No results for input(s): WBC, HGB, PLT, NA, K, CL, CO2, BUN, CREA, GLU, PTP, INR, APTT, TBIL, TBILI, CBIL, ALT, AP, AML, AML, LPSE, LCAD, NH4, TROPT, TROIQ, PCO2, PO2, HCO3, HGBEXT, PLTEXT, INREXT, HGBEXT, PLTEXT, INREXT in the last 72 hours. No lab exists for component: SGOT, GPT,  PH  No results for input(s): PTP, INR, APTT, TBIL, TBILI, CBIL, ALT, AP, AML, LPSE, INREXT, INREXT in the last 72 hours. No lab exists for component: SGOT, GPT    Most recent blood counts (CBC) review  Lab Results   Component Value Date/Time    WBC 6.5 04/06/2022 04:31 PM    HGB 10.3 (L) 04/06/2022 04:31 PM    HCT 32.3 (L) 04/06/2022 04:31 PM    PLATELET 359 86/94/0212 04:31 PM    MCV 93 04/06/2022 04:31 PM     Most recent chemistry (BMP) test review   Lab Results   Component Value Date/Time    Sodium 144 04/06/2022 04:31 PM    Potassium 4.8 04/06/2022 04:31 PM    Chloride 110 (H) 04/06/2022 04:31 PM    CO2 19 (L) 04/06/2022 04:31 PM    Glucose 119 (H) 04/06/2022 04:31 PM    BUN 30 (H) 04/06/2022 04:31 PM    Creatinine 1.38 (H) 04/06/2022 04:31 PM    BUN/Creatinine ratio 22 04/06/2022 04:31 PM    GFR est AA 34 (L) 07/31/2020 11:48 AM    GFR est non-AA 30 (L) 07/31/2020 11:48 AM    Calcium 9.7 04/06/2022 04:31 PM     Most recent Liver enzyme test review  Lab Results   Component Value Date/Time    ALT (SGPT) 13 04/06/2022 04:31 PM    AST (SGOT) 26 04/06/2022 04:31 PM    Alk.  phosphatase 104 04/06/2022 04:31 PM    Bilirubin, total 0.3 04/06/2022 04:31 PM     Most recent coagulation (coags) review  No results found for: INR, PTMR, PTP, PT1, PT2, INREXT, INREXT  No results found for: INR, APTT, CBIL, AML, AML, LPSE, LCAD, NH4, TROPT, TROIQ, INREXT, INREXT    Diabetic assessment  Lab Results   Component Value Date/Time    Hemoglobin A1c 6.9 (H) 04/06/2022 04:31 PM       Nutritional assessment screen to assess wound healing ability:  Lab Results   Component Value Date/Time    Protein, total 7.3 04/06/2022 04:31 PM    Albumin 4.0 04/06/2022 04:31 PM     Lab Results   Component Value Date/Time    Protein, total 7.3 04/06/2022 04:31 PM    Albumin 4.0 04/06/2022 04:31 PM       XR Results (most recent):  No results found for this or any previous visit. CT Results (most recent):  No results found for this or any previous visit.         Admission date (for inpatients): 5/20/2022   * No surgery found *  * No surgery found *    Assessment:      Problem List Items Addressed This Visit     None          Problem List  Date Reviewed: 5/10/2022          Codes Class Noted    BMI 30.0-30.9,adult ICD-10-CM: Z68.30  ICD-9-CM: V85.30  4/29/2022        Chronic venous htn w ulcer and inflammation of r low extrem ([facility] ICD-10-CM: I87.331, L97.919  ICD-9-CM: 459.33  4/29/2022        Lymphedema ICD-10-CM: I89.0  ICD-9-CM: 457.1  4/29/2022        Venous stasis dermatitis of right lower extremity ICD-10-CM: I87.2  ICD-9-CM: 454.1  4/29/2022        History of bladder cancer ICD-10-CM: Z85.51  ICD-9-CM: V10.51  7/28/2020        History of total cystectomy ICD-10-CM: Z90.6  ICD-9-CM: V45.74  7/28/2020    Overview Signed 7/28/2020  4:15 PM by LYNN Kuhn     2/2008--due to bladder CA             Retinal edema ICD-10-CM: H35.81  ICD-9-CM: 362.83  2/28/2020        Intermediate stage nonexudative age-related macular degeneration of left eye ICD-10-CM: H77.4123  ICD-9-CM: 362.51  2/28/2020        Essential hypertension ICD-10-CM: I10  ICD-9-CM: 401.9  12/13/2018        Type 2 diabetes mellitus with skin complication, without long-term current use of insulin ([facility] ICD-10-CM: E11.628  ICD-9-CM: 250.80  12/13/2018        Arthritis ICD-10-CM: M19.90  ICD-9-CM: 716.90  12/13/2018    Overview Signed 2/26/2020  5:22 PM by LYNN Temple     Lower back                   Active Problems:    * No active hospital problems. *          Plan:     Number and Complexity of Problems addressed and   Risks of complications and/or morbidity of management    Treatment Note please see attached Discharge Instructions  Any procedures done today in the wound center (if documented in a separate note) in [facility] are made part of this record by reference  Written patient dismissal instructions given to patient or POA. Right lower extremity venous stasis ulcers  Weekly multilayer compression dressings. We encouraged elevation  We encouraged her to purchase a cast cover from [employer] to keep the dressing dry. Venous dermatitis  Weekly Desitin    Lymphedema  Elevation  Salt avoidance  Compression  Weight loss      DM2 with HgbA1c 6.9 on 4/6/22  Encourage adherence to diabetic diet and diabetic medical regimen to help facilitate wound healing  Encourage close follow-up with primary care physician  Encourage HgbA1c q3 months      BMI>30-->29  Encourage weight loss          Wound Care  No orders of the defined types were placed in this encounter. Follow-up Information    None                 Level of MDM (2/3 elements below)  Number and Complexity of Problems Addressed Amount and/or Complexity of Data to be Reviewed and Analyzed  *Each unique test, order, or document contributes to the combination of 2 or combination of 3 in Category 1 below.  Risk of Complications and/or Morbidity or Mortality of pt Management     74312  43292 SF Minimal  1self-limited or minor problem Minimal or none Minimal risk of morbidity from additional diagnostic testing or Rx   61554  74454 Low Low  2or more self-limited or minor problems;    or  1stable chronic illness;    or  9BLNGH, uncomplicated illness or injury   Limited  (Must meet the requirements of at least 1 of the 2 categories)  Category 1: Tests and documents   Any combination of 2 from the following:  Review of prior external note(s) from each unique source*;  review of the result(s) of each unique test*;   ordering of each unique test*    or   Category 2: Assessment requiring an independent historian(s)  (For the categories of independent interpretation of tests and discussion of management or test interpretation, see moderate or high) Low risk of morbidity from additional diagnostic testing or treatment     99662  76427 Mod Moderate  1or more chronic illnesses with exacerbation, progression, or side effects of treatment;    or  2or more stable chronic illnesses;    or  1undiagnosed new problem with uncertain prognosis;    or  1acute illness with systemic symptoms;    or  1KRBQT complicated injury   Moderate  (Must meet the requirements of at least 1 out of 3 categories)  Category 1: Tests, documents, or independent historian(s)  Any combination of 3 from the following:   Review of prior external note(s) from each unique source*;  Review of the result(s) of each unique test*;  Ordering of each unique test*;  Assessment requiring an independent historian(s)    or  Category 2: Independent interpretation of tests   Independent interpretation of a test performed by another physician/other qualified health care professional (not separately reported);     or  Category 3: Discussion of management or test interpretation  Discussion of management or test interpretation with external physician/other qualified health care professional/appropriate source (not separately reported)   Moderate risk of morbidity from additional diagnostic testing or treatment  Examples only:  Prescription drug management   Decision regarding minor surgery with identified patient or procedure risk factors  Decision regarding elective major surgery without identified patient or procedure risk factors   Diagnosis or treatment significantly limited by social determinants of health       26602  28819 High High  1or more chronic illnesses with severe exacerbation, progression, or side effects of treatment;    or  1 acute or chronic illness or injury that poses a threat to life or bodily function   Extensive  (Must meet the requirements of at least 2 out of 3 categories)  Category 1: Tests, documents, or independent historian(s)  Any combination of 3 from the following:   Review of prior external note(s) from each unique source*;  Review of the result(s) of each unique test*;   Ordering of each unique test*;   Assessment requiring an independent historian(s)    or   Category 2: Independent interpretation of tests   Independent interpretation of a test performed by another physician/other qualified health care professional (not separately reported);     or  Category 3: Discussion of management or test interpretation  Discussion of management or test interpretation with external physician/other qualified health care professional/appropriate source (not separately reported)   High risk of morbidity from additional diagnostic testing or treatment  Examples only:  Drug therapy requiring intensive monitoring for toxicity  Decision regarding elective major surgery with identified patient or procedure risk factors  Decision regarding emergency major surgery  Decision regarding hospitalization  Decision not to resuscitate or to de-escalate care because of poor prognosis                 I have personally performed a face-to-face diagnostic evaluation and management  service on this patient. I have independently seen the patient. I have independently obtained the above history from the patient/family. I have independently examined the patient with above findings.   I have independently reviewed data/labs for this patient and developed the above plan of care (MDM).   Electronically signed by Brisa Yanez MD on 5/20/2022 at 10:06 AM

## 2022-05-20 NOTE — WOUND CARE
Multilayer Compression Wrap   (Not Unna) Below the Knee    NAME:  John Copeland  YOB: 1935  MEDICAL RECORD NUMBER:  [id number]  DATE:  5/20/2022    Removed old Multilayer wrap if indicated and wash leg with mild soap/water. Applied moisturizing agent to dry skin as needed. Applied primary and secondary dressing as ordered. Applied multilayered dressing below the knee to right lower leg. Instructed patient/caregiver not to remove dressing and to keep it clean and dry. Instructed patient/caregiver on complications to report to provider, such as pain, numbness in toes, heavy drainage, and slippage of dressing. Instructed patient on purpose of compression dressing and on activity and exercise recommendations.     Response to treatment: Well tolerated by patient       Electronically signed by Onelia Treviño RN on 5/20/2022 at 12:40 PM

## 2022-05-20 NOTE — WOUND CARE
[address]  [address], [address]  Phone: [phone]  Fax: [phone]    Patient: Ubaldo Finch MRN: [id number]  SSN: [id number]    YOB: 1935  Age: 81 y.o. Sex: female       Return Appointment: 2 weeks with Fer Agustin MD    Instructions: Right lower leg wounds:  Clean wound with saline. [place] [employer]: Cut to wound size, moisten with saline, and apply to wound bed. Cover with ABD or exudry. Apply zinc cream to periwound. Wrap right lower leg with 2 layer compression system. Dressings to be changed on Monday and Thursday.     Home health to change dressing 1 to 2x weekly        Do not get wrap wet in shower. May purchase cast cover from [employer] to keep dry in the shower. Should you experience increased redness, swelling, pain, foul odor, size of wound(s), or have a temperature over 101 degrees please contact the [address] [place] [address] Road at [phone] or if after hours contact your primary care physician or go to the hospital emergency department.     Signed By: Joel Suarez RN     May 20, 2022

## 2022-05-21 PROCEDURE — 3331090002 HH PPS REVENUE DEBIT

## 2022-05-21 PROCEDURE — 3331090001 HH PPS REVENUE CREDIT

## 2022-05-22 PROCEDURE — 3331090001 HH PPS REVENUE CREDIT

## 2022-05-22 PROCEDURE — 3331090002 HH PPS REVENUE DEBIT

## 2022-05-23 ENCOUNTER — HOME CARE VISIT (OUTPATIENT)
Dept: SCHEDULING | Facility: HOME HEALTH | Age: 87
End: 2022-05-23
Payer: MEDICARE

## 2022-05-23 VITALS
SYSTOLIC BLOOD PRESSURE: 145 MMHG | RESPIRATION RATE: 20 BRPM | TEMPERATURE: 98.5 F | HEART RATE: 69 BPM | DIASTOLIC BLOOD PRESSURE: 65 MMHG | OXYGEN SATURATION: 99 %

## 2022-05-23 PROCEDURE — 3331090002 HH PPS REVENUE DEBIT

## 2022-05-23 PROCEDURE — G0299 HHS/HOSPICE OF RN EA 15 MIN: HCPCS

## 2022-05-23 PROCEDURE — 3331090001 HH PPS REVENUE CREDIT

## 2022-05-24 PROCEDURE — 3331090002 HH PPS REVENUE DEBIT

## 2022-05-24 PROCEDURE — 3331090001 HH PPS REVENUE CREDIT

## 2022-05-25 PROCEDURE — 3331090001 HH PPS REVENUE CREDIT

## 2022-05-25 PROCEDURE — 3331090002 HH PPS REVENUE DEBIT

## 2022-05-26 ENCOUNTER — HOME CARE VISIT (OUTPATIENT)
Dept: SCHEDULING | Facility: HOME HEALTH | Age: 87
End: 2022-05-26
Payer: MEDICARE

## 2022-05-26 VITALS
HEART RATE: 63 BPM | OXYGEN SATURATION: 99 % | RESPIRATION RATE: 20 BRPM | SYSTOLIC BLOOD PRESSURE: 136 MMHG | TEMPERATURE: 97.5 F | DIASTOLIC BLOOD PRESSURE: 72 MMHG

## 2022-05-26 PROCEDURE — 3331090001 HH PPS REVENUE CREDIT

## 2022-05-26 PROCEDURE — G0299 HHS/HOSPICE OF RN EA 15 MIN: HCPCS

## 2022-05-26 PROCEDURE — A4385 OST SKN BARRIER SLD EXT WEAR: HCPCS

## 2022-05-26 PROCEDURE — 3331090002 HH PPS REVENUE DEBIT

## 2022-05-26 PROCEDURE — A6454 SELF-ADHER BAND W>=3" <5"/YD: HCPCS

## 2022-05-27 PROCEDURE — 3331090002 HH PPS REVENUE DEBIT

## 2022-05-27 PROCEDURE — 3331090001 HH PPS REVENUE CREDIT

## 2022-05-28 PROCEDURE — 3331090001 HH PPS REVENUE CREDIT

## 2022-05-28 PROCEDURE — 3331090002 HH PPS REVENUE DEBIT

## 2022-05-29 PROCEDURE — 3331090002 HH PPS REVENUE DEBIT

## 2022-05-29 PROCEDURE — 3331090001 HH PPS REVENUE CREDIT

## 2022-05-30 ENCOUNTER — HOME CARE VISIT (OUTPATIENT)
Dept: SCHEDULING | Facility: HOME HEALTH | Age: 87
End: 2022-05-30
Payer: MEDICARE

## 2022-05-30 VITALS
OXYGEN SATURATION: 98 % | SYSTOLIC BLOOD PRESSURE: 128 MMHG | RESPIRATION RATE: 18 BRPM | TEMPERATURE: 97.6 F | HEART RATE: 66 BPM | DIASTOLIC BLOOD PRESSURE: 68 MMHG

## 2022-05-30 PROCEDURE — 3331090002 HH PPS REVENUE DEBIT

## 2022-05-30 PROCEDURE — 3331090001 HH PPS REVENUE CREDIT

## 2022-05-30 PROCEDURE — G0299 HHS/HOSPICE OF RN EA 15 MIN: HCPCS

## 2022-05-31 PROCEDURE — 3331090002 HH PPS REVENUE DEBIT

## 2022-05-31 PROCEDURE — 3331090001 HH PPS REVENUE CREDIT

## 2022-06-01 PROCEDURE — 3331090001 HH PPS REVENUE CREDIT

## 2022-06-01 PROCEDURE — 3331090002 HH PPS REVENUE DEBIT

## 2022-06-02 ENCOUNTER — HOSPITAL ENCOUNTER (OUTPATIENT)
Dept: WOUND CARE | Age: 87
Setting detail: RECURRING SERIES
Discharge: HOME | End: 2022-06-02
Payer: MEDICARE

## 2022-06-02 ENCOUNTER — HOME CARE VISIT (OUTPATIENT)
Dept: SCHEDULING | Facility: HOME HEALTH | Age: 87
End: 2022-06-02
Payer: MEDICARE

## 2022-06-02 VITALS — BODY MASS INDEX: 29.84 KG/M2 | WEIGHT: 152 LBS | HEIGHT: 60 IN

## 2022-06-02 VITALS — SYSTOLIC BLOOD PRESSURE: 170 MMHG | DIASTOLIC BLOOD PRESSURE: 80 MMHG | HEART RATE: 62 BPM

## 2022-06-02 PROBLEM — E11.628 TYPE 2 DIABETES MELLITUS WITH SKIN COMPLICATION, WITHOUT LONG-TERM CURRENT USE OF INSULIN (HCC): Status: ACTIVE | Noted: 2018-12-13

## 2022-06-02 PROBLEM — L97.919 CHRONIC VENOUS HTN W ULCER AND INFLAMMATION OF R LOW EXTREM (HCC): Status: ACTIVE | Noted: 2022-04-29

## 2022-06-02 PROBLEM — I87.2 VENOUS STASIS DERMATITIS OF RIGHT LOWER EXTREMITY: Status: ACTIVE | Noted: 2022-04-29

## 2022-06-02 PROBLEM — I87.331 CHRONIC VENOUS HTN W ULCER AND INFLAMMATION OF R LOW EXTREM (HCC): Status: ACTIVE | Noted: 2022-04-29

## 2022-06-02 PROBLEM — I89.0 LYMPHEDEMA: Status: ACTIVE | Noted: 2022-04-29

## 2022-06-02 PROCEDURE — 99214 OFFICE O/P EST MOD 30 MIN: CPT | Performed by: SURGERY

## 2022-06-02 PROCEDURE — 3331090001 HH PPS REVENUE CREDIT

## 2022-06-02 PROCEDURE — 3331090002 HH PPS REVENUE DEBIT

## 2022-06-02 PROCEDURE — 99212 OFFICE O/P EST SF 10 MIN: CPT

## 2022-06-02 PROCEDURE — 29581 APPL MULTLAYER CMPRN SYS LEG: CPT

## 2022-06-02 NOTE — WOUND CARE
Multilayer Compression Wrap   (Not Unna) Below the Knee    NAME:  Khushi Mckeon  YOB: 1935  MEDICAL RECORD NUMBER:  [id number]  DATE:  6/2/2022    Multilayer compression wrap: Removed old Multilayer wrap if indicated and wash leg with mild soap/water. Applied moisturizing agent to dry skin as needed. Applied primary and secondary dressing as ordered. Applied multilayered dressing below the knee to right lower leg. Instructed patient/caregiver not to remove dressing and to keep it clean and dry. Instructed patient/caregiver on complications to report to provider, such as pain, numbness in toes, heavy drainage, and slippage of dressing. Instructed patient on purpose of compression dressing and on activity and exercise recommendations.       Electronically signed by Erik Ramírez RN on 6/2/2022 at 12:13 PM

## 2022-06-02 NOTE — WOUND CARE
Clinical Level of Care Assessment    Outpatient Ostomy Care      NAME:  Itz Lee  YOB: 1935  MEDICAL RECORD NUMBER:  [id number]   DATE:  6/2/2022      Patient Jose Luis Rose Assessment- Document in Flowsheet I&O   Points   Review of chart []   0   Assess Complete Ostomy tab in Navigator for assessment of; stoma status, peristomal skin, presence of hernia/stool consistency/diet/related medications   Simple adjustments to pouch size/pouch system, new stoma pattern, accessory addition/deletion. [x]   1   Assess Complete Ostomy tab in Navigator for assessment of; stoma status, peristomal skin, presence of hernia/stool consistency/diet/related medications   Moderate adjustments to pouch size/pouch system, new stoma pattern, accessory addition/deletion. Observe patient/caregiver with hands-on care. 1-2 adjustments to pouch size/system/skin care/accessory addition or deletion. []   2   Assess Complete Ostomy tab in Navigator for assessment of; stoma status, peristomal skin, presence of hernia/stool consistency/diet/related medications   Complex adjustments to pouch size/pouch system, new stoma pattern, accessory addition/deletion. 3 or more complex adjustments to pouch size/system/skin care/accessory addition or deletion. Observe patient/caregiver with hands-on care. Assess patient/patient abdomen for optimal pre-marked stoma site. Assess patient abdomen for type of hernia belt/accessory needed. []   3         Ambulation Status Documented in CN Clinical Note  Status Definition Points   Independent Independently able to ambulate. Fully able (without any assistance) to get on/off exam table/chair. []   0   Minimal Physical Assistance Requires physical assistance of one person to ambulate and/or position patient to be examined. Includes necessary physical assistance to position lower extremities on/off stool.    [x]   1   Moderate Physical Assistance Requires at least one staff member to physically assist patient in ambulating into treatment room, and/or on off chair/bed. Requires assistance to bathroom. []   2   Full Assistance Requires assistance of at least two staff members to transfer patient into treatment room and/or on/off bed/chair. \"Total Transfer\". Unable to use bathroom requires bedside commode and/or bedpan []   3       Teaching Effort Documented in [facility] Clinical Note  Effort Definition Points   No Teaching  []   0   General Initial/Simple lesson:  Assess readiness to learn, assess patient learning style to determine educational flow/special needs for learning. Teaching related to 1-3 topics  Documentation in [facility] completed. [x]   1   Intermediate Assess readiness to learn, assess patient learning style to determine educational flow/special needs for learning. Teaching related to 3-4 topics. Hernia belt application and care considerations  Documentation in [facility] completed. []   2   Complex Assess readiness to learn, assess patient learning style to determine educational flow/special needs for learning. Teaching of greater than 5 additional topics   Pre-operative ostomy education with review of written resources for patient/family/caregiver as needed. Demonstration/return demonstration of ostomy irrigation  Documentation in [facility] completed. []   3     Patient Assessment and Planning in [facility] Clinical Note   Planning Definition Points   Simple Simple pouch change procedure completed and reviewed with patient/family/caregiver   Documentation in [facility] completed. []   1   Intermediate Moderate level of follow-up needs:   Pouch change/discharge procedure revised and reviewed with patient/caregiver. Communications with outside resources; i.e. Telephone calls to Surgeon/ PCP, family/caregiver, home health, ECF. Documentation in [facility] completed.      [x]   2   Complex Complex level of instructions/changes:   Family/Caregiver learning/demonstration/return demonstration visit. Pouching/discharge procedure revised/reviewed with patient/family/caregiver. Contact with outside resources; i.e. communication with Surgeon/ PCP, home health, ECF. Contact/referral to ostomy appliance supplier for new or additional products. Review when to call [facility] or schedule follow-up visit. Referral to Emergency Department   Documentation in [facility] completed. []   3       Is this the Patient's First Visit with [facility] @ [facility]? Yes    Is this Patient Established to this [facility] within the last 3 years?    Yes             Clinical Level of Care      Points  0-3  Level 1 []     Points  4-6  Level 2 [x]     Points  7-8  Level 3 []     Points  9-10  Level 4 []     Points  11-12  Level 5 []       Electronically signed by Beverly Avitia RN on 6/2/2022 at 12:14 PM

## 2022-06-02 NOTE — PROGRESS NOTES
[address] [place] [address]  Consult Note/H&P/Progress Note      [address] RECORD NUMBER:  [id number]  AGE: 81 y.o. RACE White (non-)  GENDER: female  : 1935  EPISODE DATE:  2022       Subjective:      CC (Chief Complaint) and HISTORY of PRESENT ILLNESS (HPI):    Andrew Zhang is a 81 y.o. female who presents today for wound/ulcer evaluation. Her first visit to the wound center with us was on 22   She reported progressive ulcerations and blistering of her right lower extremity since approximately 2021. Nothing in particular made her condition better or worse. She associated the condition with use of a venous thrombosis pump which was placed on her leg in the summer 2021.    No associated fevers      She has been diabetic for approximately 20 years         This information was obtained from the patient  The following HPI elements were documented for the patient's wound:  Location:  Right lower extremity ulcers  Duration: Since approximately 2021  Severity : Moderate severity   Context: History of diabetes mellitus for 20 years   Modifying Factors:  Nothing makes better or worse   Quality: No reported history of DVT   Timing:     Associated Signs and Symptoms : No fevers         Ulcer Identification:   Ulcer Type: venous      Contributing Factors: venous stasis, lymphedema and diabetes                PAST MEDICAL HISTORY  Past Medical History:   Diagnosis Date    Arthritis     Cancer ([address]     Diabetes ([address]     Hypertension         PAST SURGICAL HISTORY  Past Surgical History:   Procedure Laterality Date    ANTERIOR CRUCIATE LIGAMENT REPAIR      CATARACT REMOVAL Left     CHOLECYSTECTOMY      GYN      HX CYSTECTOMY      Bladder removal wears a bag    HYSTERECTOMY, VAGINAL      ORTHOPEDIC SURGERY      TOE AMPUTATION  2021    pt had a hangmail which infected       FAMILY HISTORY  Family History   Problem Relation Age of Onset    Diabetes Sister     Heart Disease Sister     Heart Disease Father        SOCIAL HISTORY  Social History     Tobacco Use    Smoking status: Never Smoker    Smokeless tobacco: Never Used   Substance Use Topics    Alcohol use: No    Drug use: No       ALLERGIES  Allergies   Allergen Reactions    Penicillins Rash       MEDICATIONS  Current Outpatient Medications on File Prior to Encounter   Medication Sig Dispense Refill    acetaminophen (TYLENOL) 500 MG tablet Take 500 mg by mouth every 6 hours as needed      atenolol (TENORMIN) 50 MG tablet TAKE 2 TABLETS BY MOUTH DAILY WITH BREAKFAST      Calcium Carb-Cholecalciferol 600-800 MG-UNIT CHEW Take 1 tablet by mouth daily      furosemide (LASIX) 20 MG tablet Take 20 mg by mouth daily      losartan (COZAAR) 50 MG tablet Take 50 mg by mouth daily      metFORMIN (GLUCOPHAGE-XR) 500 MG extended release tablet Take 500 mg by mouth 2 times daily      triamcinolone (KENALOG) 0.1 % cream Apply topically 2 times daily       No current facility-administered medications on file prior to encounter. REVIEW OF SYSTEMS  The patient has no difficulty with chest pain or shortness of breath. No fever or chills. Comprehensive review of systems was otherwise unremarkable except as noted above. Objective:   Physical Exam:   Recent vitals (if inpt):  Patient Vitals for the past 24 hrs:   BP Pulse   06/02/22 1109 (!) 170/80 62       BP (!) 170/80   Pulse 62   Wt Readings from Last 3 Encounters:   06/02/22 152 lb (68.9 kg)   05/20/22 152 lb (68.9 kg)     Constitutional: Alert, oriented, cooperative patient in no acute distress; appears stated age;  General appearance is within normal limits for wound care patient population. See the today's recorded vitals signs and constitutional data. Eyes: Pupils equal; Sclera are clear. EOMs intact; The eyes appear to track and move normally. The sclera are not injected.  The conjunctive are clear. The eyelids are normal. There is no scleral icterus. ENMT: There are no obvious external ear, nose, lip or mouth lesions. Nares normal; Neck: Overall contour of the neck is normal with no obvious neck masses. Gross hearing is within normal limits. No obvious neck masses  Resp:  Breathing is non-labored; normal rate and effort; no audible wheezing. CV: RRR;  no JVD; No evidence of cyanosis of the upper extremities. The extremities are perfused without embolic sign, splinter hemorrhages, or petechia. GI: soft and non-distended without acute abnormality noted. Musculoskeletal: unremarkable with normal function. No embolic signs or cyanosis. Neuro:  Oriented; moves all 4; no focal deficits  Psychiatric: Judgement and insight are within normal limits for the wound care population of patients. Patient is oriented to person, place, and time. Recent and remote memory are within normal limits. Mood and affect are within normal limits. Integumentary (Skin/Wounds)  See inspection of wound(s) below. There are no other skin areas of palpable concern. See wound center documentation including photos in the [address] Wound Template made part of this record by reference. Wound Care Documentation:    Wound 04/11/12 Pretibial Distal;Right;Medial #2 (Active)   Wound Image   06/02/22 1111   Wound Etiology Venous 06/02/22 1111   Dressing Status Intact 06/02/22 1111   Wound Cleansed Soap and water 06/02/22 1111   Dressing/Treatment Hydrofera blue 06/02/22 1111   Wound Length (cm) 0.6 cm 06/02/22 1111   Wound Width (cm) 0.4 cm 06/02/22 1111   Wound Depth (cm) 0.1 cm 06/02/22 1111   Wound Surface Area (cm^2) 0.24 cm^2 06/02/22 1111   Wound Volume (cm^3) 0.024 cm^3 06/02/22 1111   Wound Assessment Pink/red;Granulation tissue; Epithelialization 06/02/22 1111   Drainage Amount Small 06/02/22 1111   Drainage Description Serosanguinous 06/02/22 1111   Odor None 06/02/22 1111 Megan-wound Assessment Intact 06/02/22 1111   Wound Thickness Description not for Pressure Injury Full thickness 06/02/22 1111   Number of days: 3703       Wound 04/11/12 Tibial Proximal;Right;Posterior #3 (Active)   Wound Image   06/02/22 1111   Wound Etiology Venous 06/02/22 1111   Dressing Status Intact 06/02/22 1111   Wound Cleansed Soap and water 06/02/22 1111   Dressing/Treatment Hydrofera blue 06/02/22 1111   Wound Length (cm) 0 cm 06/02/22 1111   Wound Width (cm) 0 cm 06/02/22 1111   Wound Depth (cm) 0 cm 06/02/22 1111   Wound Surface Area (cm^2) 0 cm^2 06/02/22 1111   Wound Volume (cm^3) 0 cm^3 06/02/22 1111   Wound Assessment Epithelialization 06/02/22 1111   Drainage Amount None 06/02/22 1111   Megan-wound Assessment Intact 06/02/22 1111   Number of days: 3703       Wound 04/08/22 Pretibial Right;Medial;Proximal #1 (Active)   Wound Image   06/02/22 1111   Wound Etiology Venous 06/02/22 1111   Dressing Status Intact 06/02/22 1111   Wound Cleansed Soap and water 06/02/22 1111   Dressing/Treatment Hydrofera blue 06/02/22 1111   Wound Length (cm) 1 cm 06/02/22 1111   Wound Width (cm) 2.2 cm 06/02/22 1111   Wound Depth (cm) 0.1 cm 06/02/22 1111   Wound Surface Area (cm^2) 2.2 cm^2 06/02/22 1111   Wound Volume (cm^3) 0.22 cm^3 06/02/22 1111   Wound Assessment Pink/red;Epithelialization 06/02/22 1111   Drainage Amount Small 06/02/22 1111   Drainage Description Serosanguinous 06/02/22 1111   Odor None 06/02/22 1111   Megan-wound Assessment Intact 06/02/22 1111   Wound Thickness Description not for Pressure Injury Full thickness 06/02/22 1111   Number of days: 54                                                              Amount and/or Complexity of Data Reviewed and Analyzed:  I reviewed and analyzed all of the unique labs and radiologic studies that are shown below as well as any that are in the HPI, and any that are in the expanded problem list below  *Each unique test, order, or document contributes to the combination of 2 or combination of 3 in Category 1 below. I also independently reviewed radiology images for studies I described above in the HPI or studies I have ordered. For this visit I also reviewed old records and prior notes. No results for input(s): WBC, HGB, PLT, NA, K, CL, CO2, BUN, CREA, GLU, INR, APTT, ALT, AML, AML, LCAD, PCO2, PO2, HCO3 in the last 72 hours. Invalid input(s): PTP, TBIL, TBILI, CBIL, SGOT, GPT, AP, LPSE, NH4, TROPT, TROIQ,  PH  No results for input(s): INR, APTT, ALT, AML in the last 72 hours.     Invalid input(s): PTP, TBIL, TBILI, CBIL, SGOT, GPT, AP, LPSE    Most recent blood counts (CBC) review  Lab Results   Component Value Date    WBC 6.5 04/06/2022    HGB 10.3 (L) 04/06/2022    HCT 32.3 (L) 04/06/2022    MCV 93 04/06/2022     04/06/2022     Most recent chemistry (BMP) test review   Lab Results   Component Value Date     04/06/2022    K 4.8 04/06/2022     04/06/2022    CO2 19 04/06/2022    BUN 30 04/06/2022    CREATININE 1.38 04/06/2022    GLUCOSE 119 04/06/2022    CALCIUM 9.7 04/06/2022      Most recent Liver enzyme test review  Lab Results   Component Value Date    ALT 13 04/06/2022    AST 26 04/06/2022    ALKPHOS 104 04/06/2022    BILITOT 0.3 04/06/2022     Most recent coagulation (coags) review  @lastinr@  No results found for: INR, APTT, AML, AML, LCAD    Diabetic assessment  Lab Results   Component Value Date    LABA1C 6.9 (H) 04/06/2022     Lab Results   Component Value Date     04/06/2022       Nutritional assessment screen to assess wound healing ability:  Lab Results   Component Value Date    LABALBU 4.0 04/06/2022     No results found for: TP, ALBR, ALB    @lastXray@  @lastCT@      Admission date (for inpatients): 6/2/2022   Day of Surgery  * No procedures listed *    Assessment:      Problem List Items Addressed This Visit     None          [unfilled]    Active Problems:    Type 2 diabetes mellitus with skin complication, without long-term current use of insulin (HCC)    BMI 30.0-30.9,adult    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 ([address] 04/29/2022    Lymphedema 04/29/2022    Venous stasis dermatitis of right lower extremity 04/29/2022    History of total cystectomy 07/28/2020 2/2008--due to bladder CA        History of bladder cancer 07/28/2020    Retinal edema 02/28/2020    Intermediate stage nonexudative age-related macular degeneration of left eye 02/28/2020    Arthritis 12/13/2018     Lower back        Essential hypertension 12/13/2018    Type 2 diabetes mellitus with skin complication, without long-term current use of insulin ([facility] 12/13/2018           Plan:     Number and Complexity of Problems addressed and   Risks of complications and/or morbidity of management    Treatment Note please see attached Discharge Instructions  Any procedures done today in the wound center (if documented in a separate note) in [facility] are made part of this record by reference  Written patient dismissal instructions given to patient or POA. Right lower extremity venous stasis ulcers   Weekly multilayer compression dressings. We encouraged elevation   We encouraged her to purchase a cast cover from [employer] to keep the dressing dry.      Consider wound biopsy to rule out occult malignancy     Venous dermatitis   Weekly Desitin      Lymphedema   Elevation   Salt avoidance   Compression   Weight loss         DM2 with HgbA1c 6.9 on 4/6/22   Encourage adherence to diabetic diet and diabetic medical regimen to help facilitate wound healing   Encourage close follow-up with primary care physician   Encourage HgbA1c q3 months BMI>30-->29   Encourage weight loss       Treatment significantly limited by social determinants of health  The patient so far has not been able to be compliant with significant reduction in her BMI   which is exacerbating lymphedema and impairing her ability   to treat her dermatitis, venous hypertension, and chronic venous leg ulcers                          Wound Care  No orders of the defined types were placed in this encounter. [unfilled]            Level of MDM (2/3 elements below)  Number and Complexity of Problems Addressed Amount and/or Complexity of Data to be Reviewed and Analyzed  *Each unique test, order, or document contributes to the combination of 2 or combination of 3 in Category 1 below.  Risk of Complications and/or Morbidity or Mortality of pt Management     17185  53685 SF Minimal  1self-limited or minor problem Minimal or none Minimal risk of morbidity from additional diagnostic testing or Rx   58054  09655 Low Low  2or more self-limited or minor problems;    or  1stable chronic illness;    or  0XBBMN, uncomplicated illness or injury   Limited  (Must meet the requirements of at least 1 of the 2 categories)  Category 1: Tests and documents   Any combination of 2 from the following:  Review of prior external note(s) from each unique source*;  review of the result(s) of each unique test*;   ordering of each unique test*    or   Category 2: Assessment requiring an independent historian(s)  (For the categories of independent interpretation of tests and discussion of management or test interpretation, see moderate or high) Low risk of morbidity from additional diagnostic testing or treatment     50058  12205 Mod Moderate  1or more chronic illnesses with exacerbation, progression, or side effects of treatment;    or  2or more stable chronic illnesses;    or  1undiagnosed new problem with uncertain prognosis;    or  1acute illness with systemic symptoms;    or  1acute professional (not separately reported);     or  Category 3: Discussion of management or test interpretation  Discussion of management or test interpretation with external physician/other qualified health care professional/appropriate source (not separately reported)   High risk of morbidity from additional diagnostic testing or treatment  Examples only:  Drug therapy requiring intensive monitoring for toxicity  Decision regarding elective major surgery with identified patient or procedure risk factors  Decision regarding emergency major surgery  Decision regarding hospitalization  Decision not to resuscitate or to de-escalate care because of poor prognosis                 I have personally performed a face-to-face diagnostic evaluation and management  service on this patient. I have independently seen the patient. I have independently obtained the above history from the patient/family. I have independently examined the patient with above findings. I have independently reviewed data/labs for this patient and developed the above plan of care (MDM).       Electronically signed by Melyssa Mackenzie MD on 6/2/2022 at 11:46 AM

## 2022-06-02 NOTE — FLOWSHEET NOTE
06/02/22 1111   Wound 04/11/12 Pretibial Distal;Right;Medial #2   Date First Assessed/Time First Assessed: 04/11/12 1247   Present on Hospital Admission: Yes  Primary Wound Type: Venous Ulcer  Location: Pretibial  Wound Location Orientation: Distal;Right;Medial  Wound Description (Comments): #2   Wound Image    Wound Etiology Venous   Dressing Status Intact   Wound Cleansed Soap and water   Dressing/Treatment Hydrofera blue   Wound Length (cm) 0.6 cm   Wound Width (cm) 0.4 cm   Wound Depth (cm) 0.1 cm   Wound Surface Area (cm^2) 0.24 cm^2   Wound Volume (cm^3) 0.024 cm^3   Wound Assessment Pink/red;Granulation tissue; Epithelialization   Drainage Amount Small   Drainage Description Serosanguinous   Odor None   Megan-wound Assessment Intact   Wound Thickness Description not for Pressure Injury Full thickness   Wound 04/11/12 Tibial Proximal;Right;Posterior #3   Date First Assessed/Time First Assessed: 04/11/12 1248   Present on Hospital Admission: Yes  Primary Wound Type: Venous Ulcer  Location: Tibial  Wound Location Orientation: Proximal;Right;Posterior  Wound Description (Comments): #3   Wound Image    Wound Etiology Venous   Dressing Status Intact   Wound Cleansed Soap and water   Dressing/Treatment Hydrofera blue   Wound Length (cm) 0 cm   Wound Width (cm) 0 cm   Wound Depth (cm) 0 cm   Wound Surface Area (cm^2) 0 cm^2   Wound Volume (cm^3) 0 cm^3   Wound Assessment Epithelialization   Drainage Amount None   Megan-wound Assessment Intact   Wound 04/08/22 Pretibial Right;Medial;Proximal #1   Date First Assessed/Time First Assessed: 04/08/22 1248   Present on Hospital Admission: Yes  Primary Wound Type: Venous Ulcer  Location: Pretibial  Wound Location Orientation: Right;Medial;Proximal  Wound Description (Comments): #1   Wound Image    Wound Etiology Venous   Dressing Status Intact   Wound Cleansed Soap and water   Dressing/Treatment Hydrofera blue   Wound Length (cm) 1 cm   Wound Width (cm) 2.2 cm   Wound Depth (cm) 0.1 cm   Wound Surface Area (cm^2) 2.2 cm^2   Wound Volume (cm^3) 0.22 cm^3   Wound Assessment Pink/red;Epithelialization   Drainage Amount Small   Drainage Description Serosanguinous   Odor None   Megan-wound Assessment Intact   Wound Thickness Description not for Pressure Injury Full thickness

## 2022-06-02 NOTE — WOUND CARE
Discharge Instructions for  Greg Escobar  [address] [place] [address], [address]  Phone [phone]   Fax [phone]      NAME:  Zhane Pillai  YOB: 1935  MEDICAL RECORD NUMBER:  [id number]  DATE:  6/2/2022    Return Appointment:   1 week with Valerie Ca MD      Instructions: Right lower leg wounds:  Clean wound with saline. Nani Query Blue: Cut to wound size, moisten with saline, and apply to wound bed. Cover with ABD or exudry. Apply zinc cream to periwound. Wrap right lower leg with 2 layer compression system. Dressings to be changed on Monday and Thursday.     Home health to change dressing 1 to 2x weekly        Do not get wrap wet in shower. May purchase cast cover from [employer] to keep dry in the shower. Should you experience increased redness, swelling, pain, foul odor, size of wound(s), or have a temperature over 101 degrees please contact the [address] [place] [address] Road at [phone] or if after hours contact your primary care physician or go to the hospital emergency department. PLEASE NOTE: IF YOU ARE UNABLE TO OBTAIN WOUND SUPPLIES, CONTINUE TO USE THE SUPPLIES YOU HAVE AVAILABLE UNTIL YOU ARE ABLE TO REACH US. IT IS MOST IMPORTANT TO KEEP THE WOUND COVERED AT ALL TIMES.     Electronically signed Lai Bennett RN on 6/2/2022 at 12:13 PM

## 2022-06-02 NOTE — WOUND CARE
[facility] Ostomy Referral Follow-up Note      NAME:  [address] RECORD NUMBER:  [id number]  AGE: 81 y.o. GENDER:  female  :  1935  TODAY'S DATE:  2022    Subjective: Tammi Nicholson is a 81 y.o. female who presents today for Ostomy evaluation. Pt has history of bladder cancer and had bladder removed and urostomy placed about 15 years ago. Since then had no issues with pouching and was able to get a wear time of about 4-5 days without issues. Then about 1 year ago patient states they discontinued the type of pouch she was using and has had issues with pouch staying on, on and off for about a year. 22:  Pt presents today with issues keeping pouch on for more than 1 day and peristomal irration. Pt's daughter has been crusting with stoma powder and non sting skin prep pads and using pre-cut urstam convex 2 piece urostomy pouches. Removed old pouch and noted partial thickness skin loss on peristomal skin from 9 o'clock to 2 o'clock. Recommend crusting no more than 3 layers and if urine leaks during crusting do not re-crust area just re-apply skin prep to that area and let dry prior to applying the pouch. Recommend to continue use of scooter rings to give added convexity to pouch and achieve better seal. Daughter verbalized understanding. Plan for follow up next week to eval plan and peristomal skin at that time.      Patient Referred to [facility] by:  [] Primary Care Physician  [] Surgeon  [] Advanced Practice Nurse  [] Hospitalist  [] PA  [x] Other:      Objective   PAST MEDICAL HISTORY  Past Medical History:   Diagnosis Date    Arthritis     Cancer ([address]     Diabetes [facility] [address]     Hypertension         PAST SURGICAL HISTORY  Past Surgical History:   Procedure Laterality Date    ANTERIOR CRUCIATE LIGAMENT REPAIR      CATARACT REMOVAL Left     CHOLECYSTECTOMY      GYN      HX CYSTECTOMY      Bladder removal wears a bag    HYSTERECTOMY, VAGINAL      ORTHOPEDIC SURGERY      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    pt had a hangmail which infected       ALLERGIES  Allergies   Allergen Reactions    Penicillins Rash       Assessment   Surgeon     Ostomy Type:  [] Ileostomy  [] Colostomy   [x] Urostomy  [] Other:     Date of Surgical Ostomy Procedure 15 years ago    Ht 5' 0.02\" (1.525 m)   Wt 152 lb (68.9 kg)   BMI 29.67 kg/m²     Wt Readings from Last 3 Encounters:   06/02/22 152 lb (68.9 kg)   05/20/22 152 lb (68.9 kg)       Plan:     Educated during this visit: Sahntel Cassette all that apply):  [x] Patient   [] Significant other                  [] Caregiver           [x] Family member  [] Other              Current Diet: Regular  Dietician consult: No    Discharge Plan:   Supplies given yes scooter rings  Samples requested no    Referrals:  []   [] [facility]  [] [address] [place] [address]   [] Other    Patient/Caregiver Teaching:  Written Instructions given to patient/family:  Continue crusting, do not crust more than 3 layers if urine leaks during crusting or prior to application of pouch, just re-apply skin prep do not crust again, continue using convex and scooter ring until peristomal irriation subsides.   Follow up in 1 week to eval progress and adjust treatment as needed  Teaching provided:  [] Reviewed GI and A&P        [x] Supplies  [] Pouch emptying      [] Manipulate closure  [x] Routine Care         [x] Other  [x] Pouch maintenance           Level of patient/caregiver understanding able to:  [x] Indicates understanding       [] Needs reinforcement  [] Unsuccessful      [x] Verbal Understanding  [] Demonstrated understanding       [] No evidence of learning  [] Refused teaching         [] Other    Plan for Ostomy follow-up care:  1 week    Ostomy Plan of Care  [] Supplies/Instructions given to patient/caregiver  [] Patient using home supplies  [x] Brand/supplies Thu two piece convex urostomy pouch with scooter ring  Non sting skin prep and stoma powder    Electronically signed by Charli Heller RN, CWOCN on 6/2/2022 at 12:16 PM

## 2022-06-03 PROCEDURE — 3331090001 HH PPS REVENUE CREDIT

## 2022-06-03 PROCEDURE — 3331090002 HH PPS REVENUE DEBIT

## 2022-06-04 PROCEDURE — 3331090001 HH PPS REVENUE CREDIT

## 2022-06-04 PROCEDURE — 3331090002 HH PPS REVENUE DEBIT

## 2022-06-05 PROCEDURE — 3331090002 HH PPS REVENUE DEBIT

## 2022-06-05 PROCEDURE — 3331090001 HH PPS REVENUE CREDIT

## 2022-06-06 ENCOUNTER — HOME CARE VISIT (OUTPATIENT)
Dept: HOME HEALTH SERVICES | Facility: HOME HEALTH | Age: 87
End: 2022-06-06
Payer: MEDICARE

## 2022-06-06 ENCOUNTER — HOME CARE VISIT (OUTPATIENT)
Dept: SCHEDULING | Facility: HOME HEALTH | Age: 87
End: 2022-06-06
Payer: MEDICARE

## 2022-06-06 ENCOUNTER — OFFICE VISIT (OUTPATIENT)
Dept: FAMILY MEDICINE CLINIC | Facility: CLINIC | Age: 87
End: 2022-06-06
Payer: MEDICARE

## 2022-06-06 VITALS
HEART RATE: 84 BPM | TEMPERATURE: 98.5 F | HEIGHT: 63 IN | OXYGEN SATURATION: 100 % | WEIGHT: 156 LBS | BODY MASS INDEX: 27.64 KG/M2

## 2022-06-06 VITALS
SYSTOLIC BLOOD PRESSURE: 139 MMHG | TEMPERATURE: 99 F | HEART RATE: 61 BPM | DIASTOLIC BLOOD PRESSURE: 63 MMHG | OXYGEN SATURATION: 98 % | RESPIRATION RATE: 18 BRPM

## 2022-06-06 DIAGNOSIS — I10 ESSENTIAL HYPERTENSION: ICD-10-CM

## 2022-06-06 DIAGNOSIS — Z00.00 MEDICARE ANNUAL WELLNESS VISIT, SUBSEQUENT: Primary | ICD-10-CM

## 2022-06-06 DIAGNOSIS — Z93.9: ICD-10-CM

## 2022-06-06 DIAGNOSIS — I87.331 CHRONIC VENOUS HTN W ULCER AND INFLAMMATION OF R LOW EXTREM (HCC): ICD-10-CM

## 2022-06-06 DIAGNOSIS — I89.0 LYMPHEDEMA: ICD-10-CM

## 2022-06-06 DIAGNOSIS — L97.919 CHRONIC VENOUS HTN W ULCER AND INFLAMMATION OF R LOW EXTREM (HCC): ICD-10-CM

## 2022-06-06 DIAGNOSIS — E11.622 TYPE 2 DIABETES MELLITUS WITH OTHER SKIN ULCER, WITHOUT LONG-TERM CURRENT USE OF INSULIN (HCC): ICD-10-CM

## 2022-06-06 PROCEDURE — G8417 CALC BMI ABV UP PARAM F/U: HCPCS | Performed by: FAMILY MEDICINE

## 2022-06-06 PROCEDURE — G0299 HHS/HOSPICE OF RN EA 15 MIN: HCPCS

## 2022-06-06 PROCEDURE — 3044F HG A1C LEVEL LT 7.0%: CPT | Performed by: FAMILY MEDICINE

## 2022-06-06 PROCEDURE — G8400 PT W/DXA NO RESULTS DOC: HCPCS | Performed by: FAMILY MEDICINE

## 2022-06-06 PROCEDURE — G0439 PPPS, SUBSEQ VISIT: HCPCS | Performed by: FAMILY MEDICINE

## 2022-06-06 PROCEDURE — G8427 DOCREV CUR MEDS BY ELIG CLIN: HCPCS | Performed by: FAMILY MEDICINE

## 2022-06-06 PROCEDURE — 1036F TOBACCO NON-USER: CPT | Performed by: FAMILY MEDICINE

## 2022-06-06 PROCEDURE — 3331090002 HH PPS REVENUE DEBIT

## 2022-06-06 PROCEDURE — 3331090001 HH PPS REVENUE CREDIT

## 2022-06-06 PROCEDURE — 99214 OFFICE O/P EST MOD 30 MIN: CPT | Performed by: FAMILY MEDICINE

## 2022-06-06 PROCEDURE — 1090F PRES/ABSN URINE INCON ASSESS: CPT | Performed by: FAMILY MEDICINE

## 2022-06-06 PROCEDURE — 1123F ACP DISCUSS/DSCN MKR DOCD: CPT | Performed by: FAMILY MEDICINE

## 2022-06-06 RX ORDER — METFORMIN HYDROCHLORIDE 500 MG/1
500 TABLET, EXTENDED RELEASE ORAL 2 TIMES DAILY
Qty: 180 TABLET | Refills: 3 | Status: SHIPPED | OUTPATIENT
Start: 2022-06-06

## 2022-06-06 RX ORDER — ATENOLOL 100 MG/1
100 TABLET ORAL DAILY
Qty: 90 TABLET | Refills: 3 | Status: SHIPPED | OUTPATIENT
Start: 2022-06-06 | End: 2022-09-08

## 2022-06-06 RX ORDER — LOSARTAN POTASSIUM 50 MG/1
50 TABLET ORAL DAILY
Qty: 90 TABLET | Refills: 3 | Status: SHIPPED | OUTPATIENT
Start: 2022-06-06

## 2022-06-06 ASSESSMENT — PATIENT HEALTH QUESTIONNAIRE - PHQ9
1. LITTLE INTEREST OR PLEASURE IN DOING THINGS: 0
SUM OF ALL RESPONSES TO PHQ QUESTIONS 1-9: 0
SUM OF ALL RESPONSES TO PHQ9 QUESTIONS 1 & 2: 0
2. FEELING DOWN, DEPRESSED OR HOPELESS: 0
SUM OF ALL RESPONSES TO PHQ QUESTIONS 1-9: 0

## 2022-06-06 ASSESSMENT — ENCOUNTER SYMPTOMS
EYES NEGATIVE: 1
ALLERGIC/IMMUNOLOGIC NEGATIVE: 1
RESPIRATORY NEGATIVE: 1
GASTROINTESTINAL NEGATIVE: 1

## 2022-06-06 ASSESSMENT — LIFESTYLE VARIABLES: HOW OFTEN DO YOU HAVE A DRINK CONTAINING ALCOHOL: NEVER

## 2022-06-06 NOTE — PATIENT INSTRUCTIONS
Personalized Preventive Plan for Tony Dumont - 6/6/2022  Medicare offers a range of preventive health benefits. Some of the tests and screenings are paid in full while other may be subject to a deductible, co-insurance, and/or copay. Some of these benefits include a comprehensive review of your medical history including lifestyle, illnesses that may run in your family, and various assessments and screenings as appropriate. After reviewing your medical record and screening and assessments performed today your provider may have ordered immunizations, labs, imaging, and/or referrals for you. A list of these orders (if applicable) as well as your Preventive Care list are included within your After Visit Summary for your review. Other Preventive Recommendations:    · A preventive eye exam performed by an eye specialist is recommended every 1-2 years to screen for glaucoma; cataracts, macular degeneration, and other eye disorders. · A preventive dental visit is recommended every 6 months. · Try to get at least 150 minutes of exercise per week or 10,000 steps per day on a pedometer . · Order or download the FREE \"Exercise & Physical Activity: Your Everyday Guide\" from [employer]. Call 9-902.337.8640 or search [employer] online. · You need 6495-1183 mg of calcium and 7867-3306 IU of vitamin D per day. It is possible to meet your calcium requirement with diet alone, but a vitamin D supplement is usually necessary to meet this goal.  · When exposed to the sun, use a sunscreen that protects against both UVA and UVB radiation with an SPF of 30 or greater. Reapply every 2 to 3 hours or after sweating, drying off with a towel, or swimming. · Always wear a seat belt when traveling in a car. Always wear a helmet when riding a bicycle or motorcycle.

## 2022-06-07 ENCOUNTER — HOME HEALTH ADMISSION (OUTPATIENT)
Dept: HOME HEALTH SERVICES | Facility: HOME HEALTH | Age: 87
End: 2022-06-07
Payer: MEDICARE

## 2022-06-07 PROCEDURE — A4385 OST SKN BARRIER SLD EXT WEAR: HCPCS

## 2022-06-07 PROCEDURE — MED10312 ACCUWRAP LITE, 2 LAYER, COMPRESSION SYS

## 2022-06-07 PROCEDURE — 1090000001 HH PPS REVENUE CREDIT

## 2022-06-07 PROCEDURE — 1090000002 HH PPS REVENUE DEBIT

## 2022-06-08 PROCEDURE — 1090000001 HH PPS REVENUE CREDIT

## 2022-06-08 PROCEDURE — 1090000002 HH PPS REVENUE DEBIT

## 2022-06-09 ENCOUNTER — HOSPITAL ENCOUNTER (OUTPATIENT)
Dept: WOUND CARE | Age: 87
Setting detail: RECURRING SERIES
Discharge: HOME | End: 2022-06-09
Payer: MEDICARE

## 2022-06-09 VITALS — SYSTOLIC BLOOD PRESSURE: 170 MMHG | DIASTOLIC BLOOD PRESSURE: 100 MMHG | HEART RATE: 74 BPM | TEMPERATURE: 97.9 F

## 2022-06-09 VITALS — BODY MASS INDEX: 27.64 KG/M2 | HEIGHT: 63 IN | WEIGHT: 156 LBS

## 2022-06-09 DIAGNOSIS — L30.9 DERMATITIS: ICD-10-CM

## 2022-06-09 PROCEDURE — 1090000002 HH PPS REVENUE DEBIT

## 2022-06-09 PROCEDURE — 29581 APPL MULTLAYER CMPRN SYS LEG: CPT

## 2022-06-09 PROCEDURE — 99214 OFFICE O/P EST MOD 30 MIN: CPT | Performed by: SURGERY

## 2022-06-09 PROCEDURE — 1090000001 HH PPS REVENUE CREDIT

## 2022-06-09 PROCEDURE — 99211 OFF/OP EST MAY X REQ PHY/QHP: CPT

## 2022-06-09 NOTE — WOUND CARE
Discharge Instructions for  Greg Escobar  [address] [facility] [address]  [address], [address]  Phone [phone]   Fax [phone]      NAME:  Elana Arevalo  YOB: 1935  MEDICAL RECORD NUMBER:  [id number]  DATE:  6/9/2022    Return Appointment:   1 week with Meliza Starks MD      Instructions:  Right lower leg wound:  Clean wound with saline. Antonieta Deck Blue: Cut to wound size, moisten with saline, and apply to wound bed. Cover with ABD or exudry. Apply zinc cream to periwound. Wrap right lower leg with 2 layer compression system. Dressings to be changed on Monday and Thursday.     Home health to change dressing 1 to 2x weekly        Do not get wrap wet in shower. May purchase cast cover from [employer] to keep dry in the shower. Should you experience increased redness, swelling, pain, foul odor, size of wound(s), or have a temperature over 101 degrees please contact the [address] [place] [address] Road at [phone] or if after hours contact your primary care physician or go to the hospital emergency department. PLEASE NOTE: IF YOU ARE UNABLE TO OBTAIN WOUND SUPPLIES, CONTINUE TO USE THE SUPPLIES YOU HAVE AVAILABLE UNTIL YOU ARE ABLE TO REACH US. IT IS MOST IMPORTANT TO KEEP THE WOUND COVERED AT ALL TIMES.     Electronically signed Monisha Garcia RN on 6/9/2022 at 11:16 AM

## 2022-06-09 NOTE — FLOWSHEET NOTE
06/09/22 1043   Wound 04/11/12 Pretibial Distal;Right;Medial #2   Date First Assessed/Time First Assessed: 04/11/12 1247   Present on Hospital Admission: Yes  Primary Wound Type: Venous Ulcer  Location: Pretibial  Wound Location Orientation: Distal;Right;Medial  Wound Description (Comments): #2   Wound Image    Wound Etiology Venous   Dressing Status Intact   Wound Cleansed Soap and water   Dressing/Treatment Hydrofera blue   Wound Length (cm) 0 cm   Wound Width (cm) 0 cm   Wound Depth (cm) 0 cm   Wound Surface Area (cm^2) 0 cm^2   Change in Wound Size % (l*w) 100   Wound Volume (cm^3) 0 cm^3   Wound Healing % 100   Wound Assessment Epithelialization   Drainage Amount None   Odor None   Megan-wound Assessment Intact   Wound 04/08/22 Pretibial Right;Medial;Proximal #1   Date First Assessed/Time First Assessed: 04/08/22 1248   Present on Hospital Admission: Yes  Primary Wound Type: Venous Ulcer  Location: Pretibial  Wound Location Orientation: Right;Medial;Proximal  Wound Description (Comments): #1   Wound Image    Wound Etiology Venous   Dressing Status Intact   Wound Cleansed Soap and water   Dressing/Treatment Hydrofera blue   Wound Length (cm) 1 cm   Wound Width (cm) 2 cm   Wound Depth (cm) 0.1 cm   Wound Surface Area (cm^2) 2 cm^2   Change in Wound Size % (l*w) 9.09   Wound Volume (cm^3) 0.2 cm^3   Wound Healing % 9   Wound Assessment Pink/red;Slough; Epithelialization   Drainage Amount Small   Drainage Description Serosanguinous   Odor None   Megan-wound Assessment Intact   Wound Thickness Description not for Pressure Injury Full thickness

## 2022-06-09 NOTE — WOUND CARE
Clinical Level of Care Assessment    Outpatient Ostomy Care      NAME:  Khushi Mckeon  YOB: 1935  MEDICAL RECORD NUMBER:  [id number]   DATE:  6/9/2022      Patient Bruce Nicole Assessment- Document in Flowsheet I&O   Points   Review of chart [x]   0   Assess Complete Ostomy tab in Navigator for assessment of; stoma status, peristomal skin, presence of hernia/stool consistency/diet/related medications   Simple adjustments to pouch size/pouch system, new stoma pattern, accessory addition/deletion. []   1   Assess Complete Ostomy tab in Navigator for assessment of; stoma status, peristomal skin, presence of hernia/stool consistency/diet/related medications   Moderate adjustments to pouch size/pouch system, new stoma pattern, accessory addition/deletion. Observe patient/caregiver with hands-on care. 1-2 adjustments to pouch size/system/skin care/accessory addition or deletion. []   2   Assess Complete Ostomy tab in Navigator for assessment of; stoma status, peristomal skin, presence of hernia/stool consistency/diet/related medications   Complex adjustments to pouch size/pouch system, new stoma pattern, accessory addition/deletion. 3 or more complex adjustments to pouch size/system/skin care/accessory addition or deletion. Observe patient/caregiver with hands-on care. Assess patient/patient abdomen for optimal pre-marked stoma site. Assess patient abdomen for type of hernia belt/accessory needed. []   3         Ambulation Status Documented in [facility] Clinical Note  Status Definition Points   Independent Independently able to ambulate. Fully able (without any assistance) to get on/off exam table/chair. []   0   Minimal Physical Assistance Requires physical assistance of one person to ambulate and/or position patient to be examined. Includes necessary physical assistance to position lower extremities on/off stool.    [x]   1   Moderate Physical Assistance Requires at least one staff member to physically assist patient in ambulating into treatment room, and/or on off chair/bed. Requires assistance to bathroom. []   2   Full Assistance Requires assistance of at least two staff members to transfer patient into treatment room and/or on/off bed/chair. \"Total Transfer\". Unable to use bathroom requires bedside commode and/or bedpan []   3       Teaching Effort Documented in [facility] Clinical Note  Effort Definition Points   No Teaching  []   0   General Initial/Simple lesson:  Assess readiness to learn, assess patient learning style to determine educational flow/special needs for learning. Teaching related to 1-3 topics  Documentation in [facility] completed. [x]   1   Intermediate Assess readiness to learn, assess patient learning style to determine educational flow/special needs for learning. Teaching related to 3-4 topics. Hernia belt application and care considerations  Documentation in [facility] completed. []   2   Complex Assess readiness to learn, assess patient learning style to determine educational flow/special needs for learning. Teaching of greater than 5 additional topics   Pre-operative ostomy education with review of written resources for patient/family/caregiver as needed. Demonstration/return demonstration of ostomy irrigation  Documentation in [facility] completed. []   3     Patient Assessment and Planning in [facility] Clinical Note   Planning Definition Points   Simple Simple pouch change procedure completed and reviewed with patient/family/caregiver   Documentation in [facility] completed. [x]   1   Intermediate Moderate level of follow-up needs:   Pouch change/discharge procedure revised and reviewed with patient/caregiver. Communications with outside resources; i.e. Telephone calls to Surgeon/ PCP, family/caregiver, home health, ECF. Documentation in [facility] completed.      []   2   Complex Complex level of instructions/changes:   Family/Caregiver learning/demonstration/return demonstration visit. Pouching/discharge procedure revised/reviewed with patient/family/caregiver. Contact with outside resources; i.e. communication with Surgeon/ PCP, home health, ECF. Contact/referral to ostomy appliance supplier for new or additional products. Review when to call [facility] or schedule follow-up visit. Referral to Emergency Department   Documentation in [facility] completed. []   3       Is this the Patient's First Visit with [facility] @ [facility]?  no    Is this Patient Established to this [facility] within the last 3 years?    yes             Clinical Level of Care      Points  0-3  Level 1 [x]     Points  4-6  Level 2 []     Points  7-8  Level 3 []     Points  9-10  Level 4 []     Points  11-12  Level 5 []       Electronically signed by Sumeet Galindo RN on 6/9/2022 at 10:52 AM

## 2022-06-09 NOTE — PROGRESS NOTES
[address] [facility] [address]    [address] [place], [place]  Consult Note/H&P/Progress Note      [address] RECORD NUMBER:  [id number]  AGE: 81 y.o. RACE White (non-)  GENDER: female  : 1935  EPISODE DATE:  2022       Subjective:      CC (Chief Complaint) and HISTORY of PRESENT ILLNESS (HPI):    Tresia Severin is a 81 y.o. female who presents today for wound/ulcer evaluation. Her first visit to the wound center with us was on 22   She reported progressive ulcerations and blistering of her right lower extremity since approximately 2021. Nothing in particular made her condition better or worse. She associated the condition with use of a venous thrombosis pump which was placed on her leg in the summer 2021.    No associated fevers      She has been diabetic for approximately 20 years         This information was obtained from the patient  The following HPI elements were documented for the patient's wound:  Location:  Right lower extremity ulcers  Duration: Since approximately 2021  Severity : Moderate severity   Context: History of diabetes mellitus for 20 years   Modifying Factors:  Nothing makes better or worse   Quality: No reported history of DVT   Timing:     Associated Signs and Symptoms : No fevers         Ulcer Identification:   Ulcer Type: venous      Contributing Factors: venous stasis, lymphedema and diabetes                PAST MEDICAL HISTORY  Past Medical History:   Diagnosis Date    Arthritis     Cancer ([address]     Diabetes ([address]     History of urostomy     Hypertension         PAST SURGICAL HISTORY  Past Surgical History:   Procedure Laterality Date    ANTERIOR CRUCIATE LIGAMENT REPAIR      CATARACT REMOVAL Left     CHOLECYSTECTOMY      GYN      HX CYSTECTOMY  2008    Bladder removal wears a bag    HYSTERECTOMY, VAGINAL      ORTHOPEDIC SURGERY      TOE AMPUTATION  2021    pt had a hangmail which infected FAMILY HISTORY  Family History   Problem Relation Age of Onset    Diabetes Sister     Heart Disease Sister     Heart Disease Father        SOCIAL HISTORY  Social History     Tobacco Use    Smoking status: Never Smoker    Smokeless tobacco: Never Used   Vaping Use    Vaping Use: Never used   Substance Use Topics    Alcohol use: No    Drug use: No       ALLERGIES  Allergies   Allergen Reactions    Penicillins Rash       MEDICATIONS  Current Outpatient Medications on File Prior to Encounter   Medication Sig Dispense Refill    Saccharomyces boulardii (DIGESTIVE PROBIOTIC PO) Take 1 tablet by mouth daily      Multiple Vitamin (MULTIVITAMIN PO) Take 1 tablet by mouth daily      atenolol (TENORMIN) 100 MG tablet Take 1 tablet by mouth daily 90 tablet 3    losartan (COZAAR) 50 mg tablet Take 1 tablet by mouth daily 90 tablet 3    metFORMIN (GLUCOPHAGE-XR) 500 mg extended release tablet Take 1 tablet by mouth 2 times daily 180 tablet 3    acetaminophen (TYLENOL) 500 MG tablet Take 500 mg by mouth every 6 hours as needed      Calcium Carb-Cholecalciferol 600-800 MG-UNIT CHEW Take 1 tablet by mouth daily      furosemide (LASIX) 20 MG tablet Take 20 mg by mouth daily      triamcinolone (KENALOG) 0.1 % cream Apply topically 2 times daily       No current facility-administered medications on file prior to encounter. REVIEW OF SYSTEMS  The patient has no difficulty with chest pain or shortness of breath. No fever or chills. Comprehensive review of systems was otherwise unremarkable except as noted above.       Objective:   Physical Exam:   Recent vitals (if inpt):  Patient Vitals for the past 24 hrs:   BP Temp Temp src Pulse   06/09/22 1041 (!) 170/100 97.9 °F (36.6 °C) Temporal 74       BP (!) 170/100   Pulse 74   Temp 97.9 °F (36.6 °C) (Temporal)   Wt Readings from Last 3 Encounters:   06/09/22 156 lb (70.8 kg)   06/06/22 156 lb (70.8 kg)   06/02/22 152 lb (68.9 kg)     Constitutional: Alert, oriented, cooperative patient in no acute distress; appears stated age;  General appearance is within normal limits for wound care patient population. See the today's recorded vitals signs and constitutional data. Eyes: Pupils equal; Sclera are clear. EOMs intact; The eyes appear to track and move normally. The sclera are not injected. The conjunctive are clear. The eyelids are normal. There is no scleral icterus. ENMT: There are no obvious external ear, nose, lip or mouth lesions. Nares normal; Neck: Overall contour of the neck is normal with no obvious neck masses. Gross hearing is within normal limits. No obvious neck masses  Resp:  Breathing is non-labored; normal rate and effort; no audible wheezing. CV: RRR;  no JVD; No evidence of cyanosis of the upper extremities. The extremities are perfused without embolic sign, splinter hemorrhages, or petechia. GI: soft and non-distended without acute abnormality noted. Musculoskeletal: unremarkable with normal function. No embolic signs or cyanosis. Neuro:  Oriented; moves all 4; no focal deficits  Psychiatric: Judgement and insight are within normal limits for the wound care population of patients. Patient is oriented to person, place, and time. Recent and remote memory are within normal limits. Mood and affect are within normal limits. Integumentary (Skin/Wounds)  See inspection of wound(s) below. There are no other skin areas of palpable concern. See wound center documentation including photos in the [address] Wound Template made part of this record by reference.          Wound Care Documentation:    Wound 04/11/12 Pretibial Distal;Right;Medial #2 (Active)   Wound Image   06/09/22 1043   Wound Etiology Venous 06/09/22 1043   Dressing Status Intact 06/09/22 1043   Wound Cleansed Soap and water 06/09/22 1043   Dressing/Treatment Hydrofera blue 06/09/22 1043   Wound Length (cm) 0 cm 06/09/22 1043   Wound Width (cm) 0 cm 06/09/22 1043 1043   Wound Thickness Description not for Pressure Injury Full thickness 06/09/22 1043   Number of days: 61                                                                      Amount and/or Complexity of Data Reviewed and Analyzed:  I reviewed and analyzed all of the unique labs and radiologic studies that are shown below as well as any that are in the HPI, and any that are in the expanded problem list below  *Each unique test, order, or document contributes to the combination of 2 or combination of 3 in Category 1 below. I also independently reviewed radiology images for studies I described above in the HPI or studies I have ordered. For this visit I also reviewed old records and prior notes. No results for input(s): WBC, HGB, PLT, NA, K, CL, CO2, BUN, CREA, GLU, INR, APTT, ALT, AML, AML, LCAD, PCO2, PO2, HCO3 in the last 72 hours. Invalid input(s): PTP, TBIL, TBILI, CBIL, SGOT, GPT, AP, LPSE, NH4, TROPT, TROIQ,  PH  No results for input(s): INR, APTT, ALT, AML in the last 72 hours.     Invalid input(s): PTP, TBIL, TBILI, CBIL, SGOT, GPT, AP, LPSE    Most recent blood counts (CBC) review  Lab Results   Component Value Date    WBC 6.5 04/06/2022    HGB 10.3 (L) 04/06/2022    HCT 32.3 (L) 04/06/2022    MCV 93 04/06/2022     04/06/2022     Most recent chemistry (BMP) test review   Lab Results   Component Value Date     04/06/2022    K 4.8 04/06/2022     04/06/2022    CO2 19 04/06/2022    BUN 30 04/06/2022    CREATININE 1.38 04/06/2022    GLUCOSE 119 04/06/2022    CALCIUM 9.7 04/06/2022      Most recent Liver enzyme test review  Lab Results   Component Value Date    ALT 13 04/06/2022    AST 26 04/06/2022    ALKPHOS 104 04/06/2022    BILITOT 0.3 04/06/2022     Most recent coagulation (coags) review  @lastinr@  No results found for: INR, APTT, AML, AML, LCAD    Diabetic assessment  Lab Results   Component Value Date    LABA1C 6.9 (H) 04/06/2022     Lab Results   Component Value Date    EAG 151 04/06/2022       Nutritional assessment screen to assess wound healing ability:  Lab Results   Component Value Date    LABALBU 4.0 04/06/2022     No results found for: TP, ALBR, ALB    @lastXray@  @lastCT@      Admission date (for inpatients): 6/9/2022   Day of Surgery  * No procedures listed *    Assessment:      Problem List Items Addressed This Visit        Other    Dermatitis          [unfilled]    Active Problems:    Dermatitis    Type 2 diabetes mellitus with skin complication, without long-term current use of insulin ([address]    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 ([address] 04/29/2022    Lymphedema 04/29/2022    Venous stasis dermatitis of right lower extremity 04/29/2022    History of total cystectomy 07/28/2020 2/2008--due to bladder CA        History of bladder cancer 07/28/2020    Retinal edema 02/28/2020    Intermediate stage nonexudative age-related macular degeneration of left eye 02/28/2020    Arthritis 12/13/2018     Lower back        Essential hypertension 12/13/2018    Type 2 diabetes mellitus with skin complication, without long-term current use of insulin ([address] 12/13/2018           Plan:     Number and Complexity of Problems addressed and   Risks of complications and/or morbidity of management    Treatment Note please see attached Discharge Instructions  Any procedures done today in the wound center (if documented in a separate note) in [facility] are made part of this record by reference  Written patient dismissal instructions given to patient or POA. Right lower extremity venous stasis ulcers  Weekly multilayer compression dressings. We encouraged elevation  We encouraged her to purchase a cast cover from [employer] to keep the dressing dry. Continue Hydrofera applications to wound under compression   She has home health    Consider wound biopsy to rule out occult malignancy     Venous dermatitis   Weekly Desitin      Lymphedema   Elevation   Salt avoidance   Compression   Weight loss         DM2 with HgbA1c 6.9 on 4/6/22   Encourage adherence to diabetic diet and diabetic medical regimen to help facilitate wound healing   Encourage close follow-up with primary care physician   Encourage HgbA1c q3 months         BMI>30-->29   Encourage weight loss       Treatment significantly limited by social determinants of health  The patient so far has not been able to be compliant with significant reduction in her BMI   which is exacerbating lymphedema and impairing her ability   to treat her dermatitis, venous hypertension, and chronic venous leg ulcers                          Wound Care  No orders of the defined types were placed in this encounter. [unfilled]            Level of MDM (2/3 elements below)  Number and Complexity of Problems Addressed Amount and/or Complexity of Data to be Reviewed and Analyzed  *Each unique test, order, or document contributes to the combination of 2 or combination of 3 in Category 1 below.  Risk of Complications and/or Morbidity or Mortality of pt Management     13226  41319  Minimal  1self-limited or minor problem Minimal or none Minimal risk of morbidity from additional diagnostic testing or Rx   14225  98816 Low Low  2or more self-limited or minor problems;    or  1stable chronic illness;    or  8SOFZZ, uncomplicated illness or injury   Limited  (Must meet the requirements of at least 1 of the 2 categories)  Category 1: Tests and documents   Any combination of 2 from the following:  Review of prior external note(s) from each unique source*;  review of the result(s) of each unique test*;   ordering of each unique test*    or   Category 2: Assessment requiring an independent historian(s)  (For the categories of independent interpretation of tests and discussion of management or test interpretation, see moderate or high) Low risk of morbidity from additional diagnostic testing or treatment     63826  33777 Mod Moderate  1or more chronic illnesses with exacerbation, progression, or side effects of treatment;    or  2or more stable chronic illnesses;    or  1undiagnosed new problem with uncertain prognosis;    or  1acute illness with systemic symptoms;    or  [id number] complicated injury   Moderate  (Must meet the requirements of at least 1 out of 3 categories)  Category 1: Tests, documents, or independent historian(s)  Any combination of 3 from the following:   Review of prior external note(s) from each unique source*;  Review of the result(s) of each unique test*;  Ordering of each unique test*;  Assessment requiring an independent historian(s)    or  Category 2: Independent interpretation of tests   Independent interpretation of a test performed by another physician/other qualified health care professional (not separately reported);     or  Category 3: Discussion of management or test interpretation  Discussion of management or test interpretation with external physician/other qualified health care professional/appropriate source (not separately reported)   Moderate risk of morbidity from additional diagnostic testing or treatment  Examples only:  Prescription drug management -Hydrofera   Decision regarding minor surgery with identified patient or procedure risk factors  Decision regarding elective major surgery without identified patient or procedure risk factors   Diagnosis or treatment significantly limited by social determinants of health       37733 10652 High High  1or more chronic illnesses with severe exacerbation, progression, or side effects of treatment;    or  1 acute or chronic illness or injury that poses a threat to life or bodily function   Extensive  (Must meet the requirements of at least 2 out of 3 categories)  Category 1: Tests, documents, or independent historian(s)  Any combination of 3 from the following:   Review of prior external note(s) from each unique source*;  Review of the result(s) of each unique test*;   Ordering of each unique test*;   Assessment requiring an independent historian(s)    or   Category 2: Independent interpretation of tests   Independent interpretation of a test performed by another physician/other qualified health care professional (not separately reported);     or  Category 3: Discussion of management or test interpretation  Discussion of management or test interpretation with external physician/other qualified health care professional/appropriate source (not separately reported)   High risk of morbidity from additional diagnostic testing or treatment  Examples only:  Drug therapy requiring intensive monitoring for toxicity  Decision regarding elective major surgery with identified patient or procedure risk factors  Decision regarding emergency major surgery  Decision regarding hospitalization  Decision not to resuscitate or to de-escalate care because of poor prognosis                 I have personally performed a face-to-face diagnostic evaluation and management  service on this patient. I have independently seen the patient. I have independently obtained the above history from the patient/family. I have independently examined the patient with above findings. I have independently reviewed data/labs for this patient and developed the above plan of care (MDM).       Electronically signed by Chilo Rod MD on 6/9/2022 at 11:09 AM

## 2022-06-09 NOTE — WOUND CARE
[facility] Ostomy Referral Follow-up Note        NAME:  [address] RECORD NUMBER:  [id number]  AGE: 81 y.o. GENDER:  female  :  1935  TODAY'S DATE:  2022     Subjective: Clifford Arriola is a 81 y.o. female who presents today for Ostomy evaluation. Pt has history of bladder cancer and had bladder removed and urostomy placed about 15 years ago. Since then had no issues with pouching and was able to get a wear time of about 4-5 days without issues. Then about 1 year ago patient states they discontinued the type of pouch she was using and has had issues with pouch staying on, on and off for about a year. 22:  Pt presents today with issues keeping pouch on for more than 1 day and peristomal irration. Pt's daughter has been crusting with stoma powder and non sting skin prep pads and using pre-cut rustam convex 2 piece urostomy pouches. Removed old pouch and noted partial thickness skin loss on peristomal skin from 9 o'clock to 2 o'clock. Recommend crusting no more than 3 layers and if urine leaks during crusting do not re-crust area just re-apply skin prep to that area and let dry prior to applying the pouch. Recommend to continue use of scooter rings to give added convexity to pouch and achieve better seal. Daughter verbalized understanding. Plan for follow up next week to eval plan and peristomal skin at that time. 22  Pt presents today stating that she has been able to get 3 days wear time with pouches now and has continued crusting and using scooter ring to add convexity. Removed pouch today noted peristomal irritation now only from 8 o'clock to 3 o'clock and more superficial open areas than previously noted. Cleansed frandy stomal skin with warm water and washcloth and applied 2 layers of crusting, then applied new pouch, held in place for 5 mins. Recommend to continue crusting and scooter ring until peristomal skin is completely healed.  Recommend to follow up in 2-3 weeks to re-eval peristomal skin and adjust pouching if needed. Follow up earlier if noted a decline in pouching.      Patient Referred to [facility] by:  []? Primary Care Physician  []? Surgeon  []? Advanced Practice Nurse  []? Hospitalist  []? PA  [x]? Other:       Objective   PAST MEDICAL HISTORY  Past Medical History        Past Medical History:   Diagnosis Date    Arthritis      [facility]      Diabetes [facility]      Hypertension              PAST SURGICAL HISTORY  Past Surgical History         Past Surgical History:   Procedure Laterality Date    ANTERIOR CRUCIATE LIGAMENT REPAIR        CATARACT REMOVAL Left      CHOLECYSTECTOMY        GYN        HX CYSTECTOMY   2008     Bladder removal wears a bag    HYSTERECTOMY, VAGINAL        ORTHOPEDIC SURGERY        TOE AMPUTATION   06/2021     pt had a hangmail which infected            ALLERGIES       Allergies   Allergen Reactions    Penicillins Rash         Assessment   Surgeon      Ostomy Type:  []? Ileostomy  []? Colostomy   [x]? Urostomy  []? Other:      Date of Surgical Ostomy Procedure 15 years ago     Ht 5' 0.02\" (1.525 m)   Wt 152 lb (68.9 kg)   BMI 29.67 kg/m²          Wt Readings from Last 3 Encounters:   06/02/22 152 lb (68.9 kg)   05/20/22 152 lb (68.9 kg)         Plan:      Educated during this visit: Dian Martinez all that apply):  [x]? Patient   []? Significant other                                                                               []? Caregiver                                         [x]? Family member  []? Other                                       Current Diet: Regular  Dietician consult: No     Discharge Plan:   Supplies given yes scooter rings  Samples requested no     Referrals:  []?   []? Home Health Care  []? [address] [place] [address]   []?  Other     Patient/Caregiver Teaching:  Written Instructions given to patient/family:  6/2/22:  Continue crusting, do not crust more than 3 layers if urine leaks during crusting or prior to application of pouch, just re-apply skin prep do not crust again, continue using convex and scooter ring until peristomal irriation subsides. Follow up in 1 week to eval progress and adjust treatment as needed  6/9/22:  Continue crusting 2-3 layer, only re-apply skin prep to areas that get wet, continue using convex  2 piece thu pouch and scooter ring. Follow up in 2-3 weeks to eveal and adjust treatment as needed, may follow up earlier if needed. Teaching provided:  []? Reviewed GI and A&P                   [x]? Supplies  []? Pouch emptying                             []? Manipulate closure  [x]? Routine Care                                 [x]? Other  [x]? Pouch maintenance                      Level of patient/caregiver understanding able to:  [x]? Indicates understanding                []? Needs reinforcement  []? Unsuccessful                                 [x]? Verbal Understanding  []? Demonstrated understanding        []? No evidence of learning  []? Refused teaching                           []? Other     Plan for Ostomy follow-up care:  2-3 weeks, earlier if needed     Ostomy Plan of Care  []? Supplies/Instructions given to patient/caregiver  []? Patient using home supplies  [x]?  Brand/supplies Thu two piece convex urostomy pouch with scooter ring  Non sting skin prep and stoma powder     Electronically signed by Wilda Palacio RN, [facility] on 6/9/22

## 2022-06-10 PROCEDURE — 1090000001 HH PPS REVENUE CREDIT

## 2022-06-10 PROCEDURE — 1090000002 HH PPS REVENUE DEBIT

## 2022-06-11 PROCEDURE — 1090000001 HH PPS REVENUE CREDIT

## 2022-06-11 PROCEDURE — 1090000002 HH PPS REVENUE DEBIT

## 2022-06-12 PROCEDURE — 1090000001 HH PPS REVENUE CREDIT

## 2022-06-12 PROCEDURE — 1090000002 HH PPS REVENUE DEBIT

## 2022-06-13 ENCOUNTER — HOME CARE VISIT (OUTPATIENT)
Dept: SCHEDULING | Facility: HOME HEALTH | Age: 87
End: 2022-06-13
Payer: MEDICARE

## 2022-06-13 VITALS
DIASTOLIC BLOOD PRESSURE: 63 MMHG | HEART RATE: 61 BPM | SYSTOLIC BLOOD PRESSURE: 139 MMHG | OXYGEN SATURATION: 98 % | RESPIRATION RATE: 18 BRPM | TEMPERATURE: 99 F

## 2022-06-13 VITALS
OXYGEN SATURATION: 99 % | HEART RATE: 65 BPM | DIASTOLIC BLOOD PRESSURE: 60 MMHG | SYSTOLIC BLOOD PRESSURE: 109 MMHG | RESPIRATION RATE: 17 BRPM | TEMPERATURE: 98.5 F

## 2022-06-13 PROCEDURE — 400014 HH F/U

## 2022-06-13 PROCEDURE — 1090000001 HH PPS REVENUE CREDIT

## 2022-06-13 PROCEDURE — 1090000002 HH PPS REVENUE DEBIT

## 2022-06-13 PROCEDURE — G0299 HHS/HOSPICE OF RN EA 15 MIN: HCPCS

## 2022-06-13 ASSESSMENT — ENCOUNTER SYMPTOMS
DYSPNEA ACTIVITY LEVEL: AFTER AMBULATING MORE THAN 20 FT
STOOL DESCRIPTION: FORMED
STOOL DESCRIPTION: FORMED
PAIN LOCATION - PAIN QUALITY: ACHING

## 2022-06-14 PROCEDURE — 1090000001 HH PPS REVENUE CREDIT

## 2022-06-14 PROCEDURE — 1090000002 HH PPS REVENUE DEBIT

## 2022-06-15 PROCEDURE — 1090000002 HH PPS REVENUE DEBIT

## 2022-06-15 PROCEDURE — 1090000001 HH PPS REVENUE CREDIT

## 2022-06-16 ENCOUNTER — HOSPITAL ENCOUNTER (OUTPATIENT)
Dept: WOUND CARE | Age: 87
Setting detail: RECURRING SERIES
Discharge: HOME | End: 2022-06-16
Payer: MEDICARE

## 2022-06-16 ENCOUNTER — HOME CARE VISIT (OUTPATIENT)
Dept: SCHEDULING | Facility: HOME HEALTH | Age: 87
End: 2022-06-16
Payer: MEDICARE

## 2022-06-16 VITALS
HEART RATE: 69 BPM | BODY MASS INDEX: 27.64 KG/M2 | SYSTOLIC BLOOD PRESSURE: 150 MMHG | RESPIRATION RATE: 18 BRPM | DIASTOLIC BLOOD PRESSURE: 69 MMHG | TEMPERATURE: 98 F | HEIGHT: 63 IN | OXYGEN SATURATION: 96 % | WEIGHT: 156 LBS

## 2022-06-16 DIAGNOSIS — S91.301A OPEN WOUND OF RIGHT FOOT, INITIAL ENCOUNTER: ICD-10-CM

## 2022-06-16 PROCEDURE — 99214 OFFICE O/P EST MOD 30 MIN: CPT | Performed by: SURGERY

## 2022-06-16 PROCEDURE — 1090000002 HH PPS REVENUE DEBIT

## 2022-06-16 PROCEDURE — 1090000001 HH PPS REVENUE CREDIT

## 2022-06-16 PROCEDURE — 29581 APPL MULTLAYER CMPRN SYS LEG: CPT

## 2022-06-16 NOTE — WOUND CARE
Multilayer Compression Wrap   (Not Unna) Below the Knee    NAME:  Tresia Severin  YOB: 1935  MEDICAL RECORD NUMBER:  [id number]  DATE:  6/16/2022    Multilayer compression wrap: Removed old Multilayer wrap if indicated and wash leg with mild soap/water. Applied primary and secondary dressing as ordered. Applied multilayered dressing below the knee to right lower leg. Instructed patient/caregiver not to remove dressing and to keep it clean and dry. Instructed patient/caregiver on complications to report to provider, such as pain, numbness in toes, heavy drainage, and slippage of dressing. Instructed patient on purpose of compression dressing and on activity and exercise recommendations.       Electronically signed by Leobardo Farah PT, [facility] on 6/16/2022 at 3:08 PM

## 2022-06-16 NOTE — WOUND CARE
Discharge Instructions for  Greg Escobar  [address] [place] [address]  [address], [address]  Phone [phone]   Fax [phone]      NAME:  Itz Lee  YOB: 1935  MEDICAL RECORD NUMBER:  [id number]  DATE:  6/16/2022    Return Appointment:   1 week with Aaron Bee MD      Instructions: Right lower leg wound and right lateral foot:  Clean wound with saline. Hydrofera Ready: Cut to wound size, place in wound bed, shiny side out. Cover with ABD  Apply zinc cream to periwound. 2 layer compression with wound and lower extremity assessment. If there is any problem with the dressing (too tight, slides down, etc.) Patient to return to wound clinic to have re-wrapped by clinician. Dressings to be changed on Monday and Thursday.     Home health to change dressing 2x weekly. Be careful not to wrap compression too tight. New wound noted at lateral foot and non-blanchable erythema at anterior shin. Additional padding added to reddened areas.     Do not get wrap wet in shower. May purchase cast cover from [employer] to keep dry in the shower. Should you experience increased redness, swelling, pain, foul odor, size of wound(s), or have a temperature over 101 degrees please contact the [address] [place] [address] Road at [phone] or if after hours contact your primary care physician or go to the hospital emergency department. PLEASE NOTE: IF YOU ARE UNABLE TO OBTAIN WOUND SUPPLIES, CONTINUE TO USE THE SUPPLIES YOU HAVE AVAILABLE UNTIL YOU ARE ABLE TO REACH US. IT IS MOST IMPORTANT TO KEEP THE WOUND COVERED AT ALL TIMES.     Electronically signed Elma Saldana PT, [facility] on 6/16/2022 at 12:27 PM

## 2022-06-16 NOTE — PROGRESS NOTES
[address] [place] [address]  Consult Note/H&P/Progress Note      [address] RECORD NUMBER:  [id number]  AGE: 81 y.o. RACE White (non-)  GENDER: female  : 1935  EPISODE DATE:  2022       Subjective:      CC (Chief Complaint) and HISTORY of PRESENT ILLNESS (HPI):    Angel Woods is a 81 y.o. female who presents today for wound/ulcer evaluation. Her first visit to the wound center with us was on 22   She reported progressive ulcerations and blistering of her right lower extremity since approximately 2021. Nothing in particular made her condition better or worse. She associated the condition with use of a venous thrombosis pump which was placed on her leg in the summer 2021.    No associated fevers      She has been diabetic for approximately 20 years         This information was obtained from the patient  The following HPI elements were documented for the patient's wound:  Location:  Right lower extremity ulcers  Duration: Since approximately 2021  Severity : Moderate severity   Context: History of diabetes mellitus for 20 years   Modifying Factors:  Nothing makes better or worse   Quality: No reported history of DVT   Timing:     Associated Signs and Symptoms : No fevers         Ulcer Identification:   Ulcer Type: venous      Contributing Factors: venous stasis, lymphedema and diabetes                PAST MEDICAL HISTORY  Past Medical History:   Diagnosis Date    Arthritis     Cancer ([address]     Diabetes ([address]     History of urostomy     Hypertension         PAST SURGICAL HISTORY  Past Surgical History:   Procedure Laterality Date    ANTERIOR CRUCIATE LIGAMENT REPAIR      CATARACT REMOVAL Left     CHOLECYSTECTOMY      GYN      HX CYSTECTOMY  2008    Bladder removal wears a bag    HYSTERECTOMY, VAGINAL      ORTHOPEDIC SURGERY      TOE AMPUTATION  2021    pt had a hangmail which infected FAMILY HISTORY  Family History   Problem Relation Age of Onset    Diabetes Sister     Heart Disease Sister     Heart Disease Father        SOCIAL HISTORY  Social History     Tobacco Use    Smoking status: Never Smoker    Smokeless tobacco: Never Used   Vaping Use    Vaping Use: Never used   Substance Use Topics    Alcohol use: No    Drug use: No       ALLERGIES  Allergies   Allergen Reactions    Penicillins Rash       MEDICATIONS  Current Outpatient Medications on File Prior to Encounter   Medication Sig Dispense Refill    Saccharomyces boulardii (DIGESTIVE PROBIOTIC PO) Take 1 tablet by mouth daily      Multiple Vitamin (MULTIVITAMIN PO) Take 1 tablet by mouth daily      atenolol (TENORMIN) 100 MG tablet Take 1 tablet by mouth daily 90 tablet 3    losartan (COZAAR) 50 mg tablet Take 1 tablet by mouth daily 90 tablet 3    metFORMIN (GLUCOPHAGE-XR) 500 mg extended release tablet Take 1 tablet by mouth 2 times daily 180 tablet 3    acetaminophen (TYLENOL) 500 MG tablet Take 500 mg by mouth every 6 hours as needed      Calcium Carb-Cholecalciferol 600-800 MG-UNIT CHEW Take 1 tablet by mouth daily      furosemide (LASIX) 20 MG tablet Take 20 mg by mouth daily      triamcinolone (KENALOG) 0.1 % cream Apply topically 2 times daily       No current facility-administered medications on file prior to encounter. REVIEW OF SYSTEMS  The patient has no difficulty with chest pain or shortness of breath. No fever or chills. Comprehensive review of systems was otherwise unremarkable except as noted above.       Objective:   Physical Exam:   Recent vitals (if inpt):  Patient Vitals for the past 24 hrs:   BP Temp Temp src Pulse Resp SpO2 Height Weight   06/16/22 1123 (!) 150/69 98 °F (36.7 °C) Oral 69 18 96 % 5' 3\" (1.6 m) 156 lb (70.8 kg)       BP (!) 150/69   Pulse 69   Temp 98 °F (36.7 °C) (Oral)   Resp 18   Ht 5' 3\" (1.6 m)   Wt 156 lb (70.8 kg)   SpO2 96%   BMI 27.63 kg/m²   Wt Readings from Last 3 Encounters:   06/16/22 156 lb (70.8 kg)   06/09/22 156 lb (70.8 kg)   06/06/22 156 lb (70.8 kg)     Constitutional: Alert, oriented, cooperative patient in no acute distress; appears stated age;  General appearance is within normal limits for wound care patient population. See the today's recorded vitals signs and constitutional data. Eyes: Pupils equal; Sclera are clear. EOMs intact; The eyes appear to track and move normally. The sclera are not injected. The conjunctive are clear. The eyelids are normal. There is no scleral icterus. ENMT: There are no obvious external ear, nose, lip or mouth lesions. Nares normal; Neck: Overall contour of the neck is normal with no obvious neck masses. Gross hearing is within normal limits. No obvious neck masses  Resp:  Breathing is non-labored; normal rate and effort; no audible wheezing. CV: RRR;  no JVD; No evidence of cyanosis of the upper extremities. The extremities are perfused without embolic sign, splinter hemorrhages, or petechia. GI: soft and non-distended without acute abnormality noted. Musculoskeletal: unremarkable with normal function. No embolic signs or cyanosis. Neuro:  Oriented; moves all 4; no focal deficits  Psychiatric: Judgement and insight are within normal limits for the wound care population of patients. Patient is oriented to person, place, and time. Recent and remote memory are within normal limits. Mood and affect are within normal limits. Integumentary (Skin/Wounds)  See inspection of wound(s) below. There are no other skin areas of palpable concern. See wound center documentation including photos in the [address] Wound Template made part of this record by reference.          Wound Care Documentation:    Wound 04/08/22 Pretibial Right;Medial;Proximal #1 (Active)   Wound Image   06/16/22 1123   Wound Etiology Venous 06/16/22 1123   Dressing Status Old drainage noted 06/16/22 1123   Wound Cleansed Soap and water 06/16/22 1123   Dressing/Treatment Hydrofera blue;ABD 06/16/22 1123   Wound Length (cm) 1 cm 06/16/22 1123   Wound Width (cm) 2 cm 06/16/22 1123   Wound Depth (cm) 0.1 cm 06/16/22 1123   Wound Surface Area (cm^2) 2 cm^2 06/16/22 1123   Change in Wound Size % (l*w) 9.09 06/16/22 1123   Wound Volume (cm^3) 0.2 cm^3 06/16/22 1123   Wound Healing % 9 06/16/22 1123   Wound Assessment Pink/red 06/16/22 1123   Drainage Amount Small 06/16/22 1123   Drainage Description Serosanguinous 06/16/22 1123   Odor None 06/16/22 1123   Megan-wound Assessment Fragile 06/16/22 1123   Margins Defined edges 06/13/22 1645   Wound Thickness Description not for Pressure Injury Full thickness 06/16/22 1123   Number of days: 68       Wound 06/16/22 Foot Right;Lateral (Active)   Wound Image   06/16/22 1123   Wound Etiology Pressure Stage 2 06/16/22 1123   Dressing Status New drainage noted 06/16/22 1123   Wound Cleansed Soap and water 06/16/22 1123   Wound Length (cm) 0.4 cm 06/16/22 1123   Wound Width (cm) 0.5 cm 06/16/22 1123   Wound Depth (cm) 0.1 cm 06/16/22 1123   Wound Surface Area (cm^2) 0.2 cm^2 06/16/22 1123   Wound Volume (cm^3) 0.02 cm^3 06/16/22 1123   Wound Assessment Pink/red 06/16/22 1123   Drainage Amount Small 06/16/22 1123   Drainage Description Sanguinous 06/16/22 1123   Odor None 06/16/22 1123   Megan-wound Assessment Non-blanchable erythema 06/16/22 1123   Wound Thickness Description not for Pressure Injury Full thickness 06/16/22 1123   Number of days: 0                                                                              Amount and/or Complexity of Data Reviewed and Analyzed:  I reviewed and analyzed all of the unique labs and radiologic studies that are shown below as well as any that are in the HPI, and any that are in the expanded problem list below  *Each unique test, order, or document contributes to the combination of 2 or combination of 3 in Category 1 below.   I also independently reviewed radiology Dermatitis 06/09/2022     Priority: Medium    Artificial opening status, unspecified ([facility] 06/06/2022     Priority: Medium    BMI 30.0-30.9,adult 04/29/2022    Chronic venous htn w ulcer and inflammation of r low extrem ([facility] 04/29/2022    Lymphedema 04/29/2022    Venous stasis dermatitis of right lower extremity 04/29/2022    History of total cystectomy 07/28/2020 2/2008--due to bladder CA        History of bladder cancer 07/28/2020    Retinal edema 02/28/2020    Intermediate stage nonexudative age-related macular degeneration of left eye 02/28/2020    Arthritis 12/13/2018     Lower back        Essential hypertension 12/13/2018    Type 2 diabetes mellitus with skin complication, without long-term current use of insulin ([facility] 12/13/2018           Plan:     Number and Complexity of Problems addressed and   Risks of complications and/or morbidity of management    Treatment Note please see attached Discharge Instructions  Any procedures done today in the wound center (if documented in a separate note) in [facility] are made part of this record by reference  Written patient dismissal instructions given to patient or POA. Right lower extremity venous stasis ulcers  Weekly multilayer compression dressings. We encouraged elevation  We encouraged her to purchase a cast cover from [employer] to keep the dressing dry. Continue Hydrofera applications to wound under compression   Apply zinc oxide to pretibial shin where there is some contact dermatitis from bandaging  She has home health    Consider wound biopsy to rule out occult malignancy      Right lateral forefoot pressure ulcer from bandage new on 6/16/2022  Most recent dressing change done by home health  Apply Hydrofera Blue's and wrap in that area.     Cushion and pad that area to distribute pressure pressure better  Asked patient to offload more to prevent pressurization of lateral forefoot with ambulation       Venous dermatitis Weekly Desitin      Lymphedema   Elevation   Salt avoidance   Compression   Weight loss         DM2 with HgbA1c 6.9 on 4/6/22   Encourage adherence to diabetic diet and diabetic medical regimen to help facilitate wound healing   Encourage close follow-up with primary care physician   Encourage HgbA1c q3 months         BMI>30-->29   Encourage weight loss       Treatment significantly limited by social determinants of health  The patient so far has not been able to be compliant with significant reduction in her BMI   which is exacerbating lymphedema and impairing her ability   to treat her dermatitis, venous hypertension, and chronic venous leg ulcers                          Wound Care  No orders of the defined types were placed in this encounter. [unfilled]            Level of MDM (2/3 elements below)  Number and Complexity of Problems Addressed Amount and/or Complexity of Data to be Reviewed and Analyzed  *Each unique test, order, or document contributes to the combination of 2 or combination of 3 in Category 1 below.  Risk of Complications and/or Morbidity or Mortality of pt Management     20410  29717  Minimal  1self-limited or minor problem Minimal or none Minimal risk of morbidity from additional diagnostic testing or Rx   40145  88134 Low Low  2or more self-limited or minor problems;    or  1stable chronic illness;    or  [id number], uncomplicated illness or injury   Limited  (Must meet the requirements of at least 1 of the 2 categories)  Category 1: Tests and documents   Any combination of 2 from the following:  Review of prior external note(s) from each unique source*;  review of the result(s) of each unique test*;   ordering of each unique test*    or   Category 2: Assessment requiring an independent historian(s)  (For the categories of independent interpretation of tests and discussion of management or test interpretation, see moderate or high) Low risk of morbidity from additional diagnostic testing or treatment     24286  [id number] Mod Moderate  1or more chronic illnesses with exacerbation, progression, or side effects of treatment;    or  2or more stable chronic illnesses;    or  1undiagnosed new problem with uncertain prognosis;    or  1acute illness with systemic symptoms;    or  [id number] complicated injury   Moderate  (Must meet the requirements of at least 1 out of 3 categories)  Category 1: Tests, documents, or independent historian(s)  Any combination of 3 from the following:   Review of prior external note(s) from each unique source*;  Review of the result(s) of each unique test*;  Ordering of each unique test*;  Assessment requiring an independent historian(s)    or  Category 2: Independent interpretation of tests   Independent interpretation of a test performed by another physician/other qualified health care professional (not separately reported);     or  Category 3: Discussion of management or test interpretation  Discussion of management or test interpretation with external physician/other qualified health care professional/appropriate source (not separately reported)   Moderate risk of morbidity from additional diagnostic testing or treatment  Examples only:  Prescription drug management -Hydrofera   Decision regarding minor surgery with identified patient or procedure risk factors  Decision regarding elective major surgery without identified patient or procedure risk factors   Diagnosis or treatment significantly limited by social determinants of health       49753  12104 High High  1or more chronic illnesses with severe exacerbation, progression, or side effects of treatment;    or  1 acute or chronic illness or injury that poses a threat to life or bodily function   Extensive  (Must meet the requirements of at least 2 out of 3 categories)  Category 1: Tests, documents, or independent historian(s)  Any combination of 3 from the following:   Review of prior external note(s) from each unique source*;  Review of the result(s) of each unique test*;   Ordering of each unique test*;   Assessment requiring an independent historian(s)    or   Category 2: Independent interpretation of tests   Independent interpretation of a test performed by another physician/other qualified health care professional (not separately reported);     or  Category 3: Discussion of management or test interpretation  Discussion of management or test interpretation with external physician/other qualified health care professional/appropriate source (not separately reported)   High risk of morbidity from additional diagnostic testing or treatment  Examples only:  Drug therapy requiring intensive monitoring for toxicity  Decision regarding elective major surgery with identified patient or procedure risk factors  Decision regarding emergency major surgery  Decision regarding hospitalization  Decision not to resuscitate or to de-escalate care because of poor prognosis                 I have personally performed a face-to-face diagnostic evaluation and management  service on this patient. I have independently seen the patient. I have independently obtained the above history from the patient/family. I have independently examined the patient with above findings. I have independently reviewed data/labs for this patient and developed the above plan of care (MDM).       Electronically signed by Sascha Hernandez MD on 6/16/2022 at 12:37 PM

## 2022-06-17 PROCEDURE — 1090000002 HH PPS REVENUE DEBIT

## 2022-06-17 PROCEDURE — 1090000001 HH PPS REVENUE CREDIT

## 2022-06-18 PROCEDURE — 1090000002 HH PPS REVENUE DEBIT

## 2022-06-18 PROCEDURE — 1090000001 HH PPS REVENUE CREDIT

## 2022-06-19 PROCEDURE — 1090000002 HH PPS REVENUE DEBIT

## 2022-06-19 PROCEDURE — 1090000001 HH PPS REVENUE CREDIT

## 2022-06-20 ENCOUNTER — HOME CARE VISIT (OUTPATIENT)
Dept: SCHEDULING | Facility: HOME HEALTH | Age: 87
End: 2022-06-20
Payer: MEDICARE

## 2022-06-20 PROCEDURE — 1090000001 HH PPS REVENUE CREDIT

## 2022-06-20 PROCEDURE — 1090000002 HH PPS REVENUE DEBIT

## 2022-06-20 PROCEDURE — A4385 OST SKN BARRIER SLD EXT WEAR: HCPCS

## 2022-06-20 PROCEDURE — G0299 HHS/HOSPICE OF RN EA 15 MIN: HCPCS

## 2022-06-21 VITALS
TEMPERATURE: 98.1 F | DIASTOLIC BLOOD PRESSURE: 67 MMHG | SYSTOLIC BLOOD PRESSURE: 149 MMHG | RESPIRATION RATE: 18 BRPM | OXYGEN SATURATION: 97 % | HEART RATE: 65 BPM

## 2022-06-21 PROCEDURE — 1090000001 HH PPS REVENUE CREDIT

## 2022-06-21 PROCEDURE — 1090000002 HH PPS REVENUE DEBIT

## 2022-06-21 ASSESSMENT — ENCOUNTER SYMPTOMS: STOOL DESCRIPTION: FORMED

## 2022-06-22 PROCEDURE — 1090000001 HH PPS REVENUE CREDIT

## 2022-06-22 PROCEDURE — 1090000002 HH PPS REVENUE DEBIT

## 2022-06-23 ENCOUNTER — HOSPITAL ENCOUNTER (OUTPATIENT)
Dept: WOUND CARE | Age: 87
Setting detail: RECURRING SERIES
Discharge: HOME | End: 2022-06-23
Payer: MEDICARE

## 2022-06-23 ENCOUNTER — HOME CARE VISIT (OUTPATIENT)
Dept: SCHEDULING | Facility: HOME HEALTH | Age: 87
End: 2022-06-23
Payer: MEDICARE

## 2022-06-23 VITALS
BODY MASS INDEX: 28.35 KG/M2 | SYSTOLIC BLOOD PRESSURE: 92 MMHG | OXYGEN SATURATION: 99 % | WEIGHT: 160 LBS | DIASTOLIC BLOOD PRESSURE: 76 MMHG | HEART RATE: 70 BPM | HEIGHT: 63 IN | RESPIRATION RATE: 18 BRPM | TEMPERATURE: 97.2 F

## 2022-06-23 PROCEDURE — 1090000001 HH PPS REVENUE CREDIT

## 2022-06-23 PROCEDURE — 29581 APPL MULTLAYER CMPRN SYS LEG: CPT

## 2022-06-23 PROCEDURE — 99214 OFFICE O/P EST MOD 30 MIN: CPT | Performed by: SURGERY

## 2022-06-23 PROCEDURE — 1090000002 HH PPS REVENUE DEBIT

## 2022-06-23 NOTE — PROGRESS NOTES
[address] [place] [address]  Consult Note/H&P/Progress Note      [address] RECORD NUMBER:  [id number]  AGE: 81 y.o. RACE White (non-)  GENDER: female  : 1935  EPISODE DATE:  2022       Subjective:      CC (Chief Complaint) and HISTORY of PRESENT ILLNESS (HPI):    Angel Woods is a 81 y.o. female who presents today for wound/ulcer evaluation. Her first visit to the wound center with us was on 22   She reported progressive ulcerations and blistering of her right lower extremity since approximately 2021. Nothing in particular made her condition better or worse. She associated the condition with use of a venous thrombosis pump which was placed on her leg in the summer 2021.    No associated fevers      She has been diabetic for approximately 20 years         This information was obtained from the patient  The following HPI elements were documented for the patient's wound:  Location:  Right lower extremity ulcers  Duration: Since approximately 2021  Severity : Moderate severity   Context: History of diabetes mellitus for 20 years   Modifying Factors:  Nothing makes better or worse   Quality: No reported history of DVT   Timing:     Associated Signs and Symptoms : No fevers         Ulcer Identification:   Ulcer Type: venous      Contributing Factors: venous stasis, lymphedema and diabetes                PAST MEDICAL HISTORY  Past Medical History:   Diagnosis Date    Arthritis     Cancer ([address]     Diabetes ([address]     History of urostomy     Hypertension         PAST SURGICAL HISTORY  Past Surgical History:   Procedure Laterality Date    ANTERIOR CRUCIATE LIGAMENT REPAIR      CATARACT REMOVAL Left     CHOLECYSTECTOMY      GYN      HX CYSTECTOMY  2008    Bladder removal wears a bag    HYSTERECTOMY, VAGINAL      ORTHOPEDIC SURGERY      TOE AMPUTATION  2021    pt had a hangmail which infected FAMILY HISTORY  Family History   Problem Relation Age of Onset    Diabetes Sister     Heart Disease Sister     Heart Disease Father        SOCIAL HISTORY  Social History     Tobacco Use    Smoking status: Never Smoker    Smokeless tobacco: Never Used   Vaping Use    Vaping Use: Never used   Substance Use Topics    Alcohol use: No    Drug use: No       ALLERGIES  Allergies   Allergen Reactions    Penicillins Rash       MEDICATIONS  Current Outpatient Medications on File Prior to Encounter   Medication Sig Dispense Refill    Saccharomyces boulardii (DIGESTIVE PROBIOTIC PO) Take 1 tablet by mouth daily      Multiple Vitamin (MULTIVITAMIN PO) Take 1 tablet by mouth daily      atenolol (TENORMIN) 100 MG tablet Take 1 tablet by mouth daily 90 tablet 3    losartan (COZAAR) 50 mg tablet Take 1 tablet by mouth daily 90 tablet 3    metFORMIN (GLUCOPHAGE-XR) 500 mg extended release tablet Take 1 tablet by mouth 2 times daily 180 tablet 3    acetaminophen (TYLENOL) 500 MG tablet Take 500 mg by mouth every 6 hours as needed      Calcium Carb-Cholecalciferol 600-800 MG-UNIT CHEW Take 1 tablet by mouth daily      furosemide (LASIX) 20 MG tablet Take 20 mg by mouth daily      triamcinolone (KENALOG) 0.1 % cream Apply topically 2 times daily       No current facility-administered medications on file prior to encounter. REVIEW OF SYSTEMS  The patient has no difficulty with chest pain or shortness of breath. No fever or chills. Comprehensive review of systems was otherwise unremarkable except as noted above.       Objective:   Physical Exam:   Recent vitals (if inpt):  Patient Vitals for the past 24 hrs:   BP Temp Temp src Pulse Resp SpO2 Height Weight   06/23/22 1145 92/76 -- -- 70 -- -- -- --   06/23/22 1125 (!) 161/71 97.2 °F (36.2 °C) Temporal 62 18 99 % 5' 3\" (1.6 m) 160 lb (72.6 kg)       BP 92/76   Pulse 70   Temp 97.2 °F (36.2 °C) (Temporal)   Resp 18   Ht 5' 3\" (1.6 m)   Wt 160 lb (72.6 kg)   SpO2 99%   BMI 28.34 kg/m²   Wt Readings from Last 3 Encounters:   06/23/22 160 lb (72.6 kg)   06/16/22 156 lb (70.8 kg)   06/09/22 156 lb (70.8 kg)     Constitutional: Alert, oriented, cooperative patient in no acute distress; appears stated age;  General appearance is within normal limits for wound care patient population. See the today's recorded vitals signs and constitutional data. Eyes: Pupils equal; Sclera are clear. EOMs intact; The eyes appear to track and move normally. The sclera are not injected. The conjunctive are clear. The eyelids are normal. There is no scleral icterus. ENMT: There are no obvious external ear, nose, lip or mouth lesions. Nares normal; Neck: Overall contour of the neck is normal with no obvious neck masses. Gross hearing is within normal limits. No obvious neck masses  Resp:  Breathing is non-labored; normal rate and effort; no audible wheezing. CV: RRR;  no JVD; No evidence of cyanosis of the upper extremities. The extremities are perfused without embolic sign, splinter hemorrhages, or petechia. GI: soft and non-distended without acute abnormality noted. Musculoskeletal: unremarkable with normal function. No embolic signs or cyanosis. Neuro:  Oriented; moves all 4; no focal deficits  Psychiatric: Judgement and insight are within normal limits for the wound care population of patients. Patient is oriented to person, place, and time. Recent and remote memory are within normal limits. Mood and affect are within normal limits. Integumentary (Skin/Wounds)  See inspection of wound(s) below. There are no other skin areas of palpable concern. See wound center documentation including photos in the [address] Wound Template made part of this record by reference.          Wound Care Documentation:    Wound 04/08/22 Pretibial Right;Medial;Proximal #1 (Active)   Wound Image   06/23/22 1147   Wound Etiology Venous 06/23/22 1147   Dressing Status Intact 06/23/22 1147   Wound Cleansed Cleansed with saline; Soap and water 06/23/22 1147   Dressing/Treatment Hydrofera blue 06/23/22 1147   Wound Length (cm) 1 cm 06/23/22 1147   Wound Width (cm) 1.8 cm 06/23/22 1147   Wound Depth (cm) 0.1 cm 06/23/22 1147   Wound Surface Area (cm^2) 1.8 cm^2 06/23/22 1147   Change in Wound Size % (l*w) 18.18 06/23/22 1147   Wound Volume (cm^3) 0.18 cm^3 06/23/22 1147   Wound Healing % 18 06/23/22 1147   Wound Assessment Pink/red 06/23/22 1147   Drainage Amount Small 06/23/22 1147   Drainage Description Serosanguinous 06/23/22 1147   Odor None 06/23/22 1147   Megan-wound Assessment Intact 06/23/22 1147   Margins Defined edges 06/20/22 1634   Wound Thickness Description not for Pressure Injury Full thickness 06/23/22 1147   Number of days: 75       Wound 06/16/22 Foot Right;Lateral (Active)   Wound Image   06/23/22 1147   Wound Etiology Pressure Stage 2 06/23/22 1147   Dressing Status Intact 06/23/22 1147   Wound Cleansed Cleansed with saline; Soap and water 06/23/22 1147   Dressing/Treatment Hydrofera blue 06/23/22 1147   Wound Length (cm) 0.4 cm 06/23/22 1147   Wound Width (cm) 0.1 cm 06/23/22 1147   Wound Depth (cm) 0.1 cm 06/23/22 1147   Wound Surface Area (cm^2) 0.04 cm^2 06/23/22 1147   Change in Wound Size % (l*w) 80 06/23/22 1147   Wound Volume (cm^3) 0.004 cm^3 06/23/22 1147   Wound Healing % 80 06/23/22 1147   Wound Assessment Pink/red 06/23/22 1147   Drainage Amount None 06/23/22 1147   Drainage Description Serosanguinous 06/20/22 1634   Odor None 06/23/22 1147   Megan-wound Assessment Dry/flaky 06/23/22 1147   Wound Thickness Description not for Pressure Injury Partial thickness 06/23/22 1147   Number of days: 7                                                                                      Amount and/or Complexity of Data Reviewed and Analyzed:  I reviewed and analyzed all of the unique labs and radiologic studies that are shown below as well as any that are in the HPI, and any that are in the expanded problem list below  *Each unique test, order, or document contributes to the combination of 2 or combination of 3 in Category 1 below. I also independently reviewed radiology images for studies I described above in the HPI or studies I have ordered. For this visit I also reviewed old records and prior notes. No results for input(s): WBC, HGB, PLT, NA, K, CL, CO2, BUN, CREA, GLU, INR, APTT, ALT, AML, AML, LCAD, PCO2, PO2, HCO3 in the last 72 hours. Invalid input(s): PTP, TBIL, TBILI, CBIL, SGOT, GPT, AP, LPSE, NH4, TROPT, TROIQ,  PH  No results for input(s): INR, APTT, ALT, AML in the last 72 hours.     Invalid input(s): PTP, TBIL, TBILI, CBIL, SGOT, GPT, AP, LPSE    Most recent blood counts (CBC) review  Lab Results   Component Value Date    WBC 6.5 04/06/2022    HGB 10.3 (L) 04/06/2022    HCT 32.3 (L) 04/06/2022    MCV 93 04/06/2022     04/06/2022     Most recent chemistry (BMP) test review   Lab Results   Component Value Date     04/06/2022    K 4.8 04/06/2022     04/06/2022    CO2 19 04/06/2022    BUN 30 04/06/2022    CREATININE 1.38 04/06/2022    GLUCOSE 119 04/06/2022    CALCIUM 9.7 04/06/2022      Most recent Liver enzyme test review  Lab Results   Component Value Date    ALT 13 04/06/2022    AST 26 04/06/2022    ALKPHOS 104 04/06/2022    BILITOT 0.3 04/06/2022     Most recent coagulation (coags) review  @lastinr@  No results found for: INR, APTT, AML, AML, LCAD    Diabetic assessment  Lab Results   Component Value Date    LABA1C 6.9 (H) 04/06/2022     Lab Results   Component Value Date     04/06/2022       Nutritional assessment screen to assess wound healing ability:  Lab Results   Component Value Date    LABALBU 4.0 04/06/2022     No results found for: TP, ALBR, ALB    @lastXray@  @lastCT@      Admission date (for inpatients): 6/23/2022   Day of Surgery  * No procedures listed *    Assessment:      Problem List Items Addressed This Visit None          [unfilled]    Active Problems:    Dermatitis    Open wound of right foot    Type 2 diabetes mellitus with skin complication, without long-term current use of insulin (HCC)    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 12/13/2018           Plan:     Number and Complexity of Problems addressed and   Risks of complications and/or morbidity of management    Treatment Note please see attached Discharge Instructions  Any procedures done today in the wound center (if documented in a separate note) in [facility] are made part of this record by reference  Written patient dismissal instructions given to patient or POA. Right lower extremity venous stasis ulcers  Weekly multilayer compression dressings. We encouraged elevation  We encouraged her to purchase a cast cover from [employer] to keep the dressing dry.   Continue Hydrofera applications to wound under compression   Apply zinc oxide to pretibial shin where there is some contact dermatitis from bandaging  She has home health    Consider wound biopsy to rule out occult malignancy      Right lateral forefoot pressure ulcer from bandage new on 6/16/2022  Most recent dressing change done by home health  Apply Hydrofera Blue's and wrap in that area. Cushion and pad that area to distribute pressure pressure better  Asked patient to offload more to prevent pressurization of lateral forefoot with ambulation       Venous dermatitis   Weekly Desitin      Lymphedema   Elevation   Salt avoidance   Compression   Weight loss         DM2 with HgbA1c 6.9 on 4/6/22   Encourage adherence to diabetic diet and diabetic medical regimen to help facilitate wound healing   Encourage close follow-up with primary care physician   Encourage HgbA1c q3 months         BMI>30-->29   Encourage weight loss       Treatment significantly limited by social determinants of health  The patient so far has not been able to be compliant with significant reduction in her BMI   which is exacerbating lymphedema and impairing her ability   to treat her dermatitis, venous hypertension, and chronic venous leg ulcers                          Wound Care  No orders of the defined types were placed in this encounter. [unfilled]            Level of MDM (2/3 elements below)  Number and Complexity of Problems Addressed Amount and/or Complexity of Data to be Reviewed and Analyzed  *Each unique test, order, or document contributes to the combination of 2 or combination of 3 in Category 1 below.  Risk of Complications and/or Morbidity or Mortality of pt Management     24063  47965 SF Minimal  1self-limited or minor problem Minimal or none Minimal risk of morbidity from additional diagnostic testing or Rx   68784  87629 Low Low  2or more self-limited or minor problems;    or  1stable chronic illness;    or  1acute, uncomplicated illness or injury   Limited  (Must meet the requirements of at least 1 of the 2 categories)  Category 1: Tests and documents   Any combination of 2 from the following:  Review of prior external note(s) from each unique source*;  review of the result(s) of each unique test*;   ordering of each unique test*    or   Category 2: Assessment requiring an independent historian(s)  (For the categories of independent interpretation of tests and discussion of management or test interpretation, see moderate or high) Low risk of morbidity from additional diagnostic testing or treatment     93274  75752 Mod Moderate  1or more chronic illnesses with exacerbation, progression, or side effects of treatment;    or  2or more stable chronic illnesses;    or  1undiagnosed new problem with uncertain prognosis;    or  1acute illness with systemic symptoms;    or  [id number] complicated injury   Moderate  (Must meet the requirements of at least 1 out of 3 categories)  Category 1: Tests, documents, or independent historian(s)  Any combination of 3 from the following:   Review of prior external note(s) from each unique source*;  Review of the result(s) of each unique test*;  Ordering of each unique test*;  Assessment requiring an independent historian(s)    or  Category 2: Independent interpretation of tests   Independent interpretation of a test performed by another physician/other qualified health care professional (not separately reported);     or  Category 3: Discussion of management or test interpretation  Discussion of management or test interpretation with external physician/other qualified health care professional/appropriate source (not separately reported)   Moderate risk of morbidity from additional diagnostic testing or treatment  Examples only:  Prescription drug management -Hydrofera   Decision regarding minor surgery with identified patient or procedure risk factors  Decision regarding elective major surgery without identified patient or procedure risk factors   Diagnosis or treatment significantly limited by social determinants of health       09764  53400 High High  1or more chronic illnesses with severe exacerbation, progression, or side effects of treatment;    or  1 acute or chronic illness or injury that poses a threat to life or bodily function   Extensive  (Must meet the requirements of at least 2 out of 3 categories)  Category 1: Tests, documents, or independent historian(s)  Any combination of 3 from the following:   Review of prior external note(s) from each unique source*;  Review of the result(s) of each unique test*;   Ordering of each unique test*;   Assessment requiring an independent historian(s)    or   Category 2: Independent interpretation of tests   Independent interpretation of a test performed by another physician/other qualified health care professional (not separately reported);     or  Category 3: Discussion of management or test interpretation  Discussion of management or test interpretation with external physician/other qualified health care professional/appropriate source (not separately reported)   High risk of morbidity from additional diagnostic testing or treatment  Examples only:  Drug therapy requiring intensive monitoring for toxicity  Decision regarding elective major surgery with identified patient or procedure risk factors  Decision regarding emergency major surgery  Decision regarding hospitalization  Decision not to resuscitate or to de-escalate care because of poor prognosis                 I have personally performed a face-to-face diagnostic evaluation and management  service on this patient. I have independently seen the patient. I have independently obtained the above history from the patient/family. I have independently examined the patient with above findings.   I have independently reviewed data/labs for this patient and developed the above plan of care (MDM).       Electronically signed by Andree Owens MD on 6/23/2022 at 11:49 AM

## 2022-06-23 NOTE — FLOWSHEET NOTE
06/23/22 1147   Wound 06/16/22 Foot Right;Lateral   Date First Assessed/Time First Assessed: 06/16/22 1148   Present on Hospital Admission: Yes  Primary Wound Type: Pressure Injury  Location: Foot  Wound Location Orientation: Right;Lateral   Wound Image    Wound Etiology Pressure Stage 2   Dressing Status Intact   Wound Cleansed Cleansed with saline; Soap and water   Dressing/Treatment Hydrofera blue   Wound Length (cm) 0.4 cm   Wound Width (cm) 0.1 cm   Wound Depth (cm) 0.1 cm   Wound Surface Area (cm^2) 0.04 cm^2   Change in Wound Size % (l*w) 80   Wound Volume (cm^3) 0.004 cm^3   Wound Healing % 80   Wound Assessment Pink/red   Drainage Amount None   Odor None   Megan-wound Assessment Dry/flaky   Wound Thickness Description not for Pressure Injury Partial thickness   Wound 04/08/22 Pretibial Right;Medial;Proximal #1   Date First Assessed/Time First Assessed: 04/08/22 1248   Present on Hospital Admission: Yes  Primary Wound Type: Venous Ulcer  Location: Pretibial  Wound Location Orientation: Right;Medial;Proximal  Wound Description (Comments): #1   Wound Image    Wound Etiology Venous   Dressing Status Intact   Wound Cleansed Cleansed with saline; Soap and water   Dressing/Treatment Hydrofera blue   Wound Length (cm) 1 cm   Wound Width (cm) 1.8 cm   Wound Depth (cm) 0.1 cm   Wound Surface Area (cm^2) 1.8 cm^2   Change in Wound Size % (l*w) 18.18   Wound Volume (cm^3) 0.18 cm^3   Wound Healing % 18   Wound Assessment Pink/red   Drainage Amount Small   Drainage Description Serosanguinous   Odor None   Megan-wound Assessment Intact   Wound Thickness Description not for Pressure Injury Full thickness

## 2022-06-23 NOTE — WOUND CARE
Multilayer Compression Wrap   (Not Unna) Below the Knee    NAME:  Cristobal Oropeza  YOB: 1935  MEDICAL RECORD NUMBER:  [id number]  DATE:  6/23/2022    Multilayer compression wrap: Removed old Multilayer wrap if indicated and wash leg with mild soap/water. Applied moisturizing agent to dry skin as needed. Applied primary and secondary dressing as ordered. Applied multilayered dressing below the knee to right lower leg. Instructed patient/caregiver not to remove dressing and to keep it clean and dry. Instructed patient/caregiver on complications to report to provider, such as pain, numbness in toes, heavy drainage, and slippage of dressing. Instructed patient on purpose of compression dressing and on activity and exercise recommendations.       Electronically signed by Kaleb Hayward RN on 6/23/2022 at 12:15 PM

## 2022-06-23 NOTE — WOUND CARE
Discharge Instructions for  Greg Escobar  [address] [facility] [address]  [address], [address]  Phone [phone]   Fax [phone]      NAME:  Yodit Ji  YOB: 1935  MEDICAL RECORD NUMBER:  [id number]  DATE:  6/23/2022    Return Appointment:   2 weeks with Harsha Fletcher MD      Instructions: Right lower leg wound and right lateral foot:  Clean wound with saline. Hydrofera Ready: Cut to wound size, place in wound bed, shiny side out. Cover with ABD  Apply zinc cream to periwound. 2 layer compression with wound and lower extremity assessment. If there is any problem with the dressing (too tight, slides down, etc.) Patient to return to wound clinic to have re-wrapped by clinician. Dressings to be changed on Monday and Thursday. Apply nystatin powder or over the counter medicated gold bond in between toes daily  HH to apply when changing RLE wrap       Home health to change dressing 2x weekly. Be careful not to wrap compression too tight. New wound noted at lateral foot and non-blanchable erythema at anterior shin. Additional padding added to reddened areas.     Do not get wrap wet in shower. May purchase cast cover from [employer] to keep dry in the shower. Should you experience increased redness, swelling, pain, foul odor, size of wound(s), or have a temperature over 101 degrees please contact the [address] [place] [address] Road at [phone] or if after hours contact your primary care physician or go to the hospital emergency department. PLEASE NOTE: IF YOU ARE UNABLE TO OBTAIN WOUND SUPPLIES, CONTINUE TO USE THE SUPPLIES YOU HAVE AVAILABLE UNTIL YOU ARE ABLE TO REACH US. IT IS MOST IMPORTANT TO KEEP THE WOUND COVERED AT ALL TIMES.     Electronically signed Johana Covington RN on 6/23/2022 at 11:49 AM

## 2022-06-24 PROCEDURE — 1090000002 HH PPS REVENUE DEBIT

## 2022-06-24 PROCEDURE — 1090000001 HH PPS REVENUE CREDIT

## 2022-06-25 PROCEDURE — 1090000001 HH PPS REVENUE CREDIT

## 2022-06-25 PROCEDURE — 1090000002 HH PPS REVENUE DEBIT

## 2022-06-26 PROCEDURE — 1090000001 HH PPS REVENUE CREDIT

## 2022-06-26 PROCEDURE — 1090000002 HH PPS REVENUE DEBIT

## 2022-06-26 PROCEDURE — A5120 SKIN BARRIER, WIPE OR SWAB: HCPCS

## 2022-06-27 ENCOUNTER — HOME CARE VISIT (OUTPATIENT)
Dept: SCHEDULING | Facility: HOME HEALTH | Age: 87
End: 2022-06-27
Payer: MEDICARE

## 2022-06-27 VITALS
SYSTOLIC BLOOD PRESSURE: 136 MMHG | OXYGEN SATURATION: 98 % | RESPIRATION RATE: 18 BRPM | HEART RATE: 73 BPM | DIASTOLIC BLOOD PRESSURE: 57 MMHG | TEMPERATURE: 98.4 F

## 2022-06-27 PROCEDURE — 1090000001 HH PPS REVENUE CREDIT

## 2022-06-27 PROCEDURE — G0299 HHS/HOSPICE OF RN EA 15 MIN: HCPCS

## 2022-06-27 PROCEDURE — 1090000002 HH PPS REVENUE DEBIT

## 2022-06-27 ASSESSMENT — ENCOUNTER SYMPTOMS: STOOL DESCRIPTION: SOFT FORMED

## 2022-06-28 PROCEDURE — 1090000001 HH PPS REVENUE CREDIT

## 2022-06-28 PROCEDURE — 1090000002 HH PPS REVENUE DEBIT

## 2022-06-29 PROCEDURE — 1090000002 HH PPS REVENUE DEBIT

## 2022-06-29 PROCEDURE — 1090000001 HH PPS REVENUE CREDIT

## 2022-06-30 ENCOUNTER — HOME CARE VISIT (OUTPATIENT)
Dept: SCHEDULING | Facility: HOME HEALTH | Age: 87
End: 2022-06-30
Payer: MEDICARE

## 2022-06-30 VITALS
DIASTOLIC BLOOD PRESSURE: 61 MMHG | TEMPERATURE: 98.6 F | OXYGEN SATURATION: 99 % | SYSTOLIC BLOOD PRESSURE: 125 MMHG | RESPIRATION RATE: 18 BRPM | HEART RATE: 66 BPM

## 2022-06-30 PROCEDURE — 1090000002 HH PPS REVENUE DEBIT

## 2022-06-30 PROCEDURE — 1090000001 HH PPS REVENUE CREDIT

## 2022-06-30 PROCEDURE — G0299 HHS/HOSPICE OF RN EA 15 MIN: HCPCS

## 2022-06-30 ASSESSMENT — ENCOUNTER SYMPTOMS: STOOL DESCRIPTION: FORMED

## 2022-07-01 PROCEDURE — 1090000002 HH PPS REVENUE DEBIT

## 2022-07-01 PROCEDURE — 1090000001 HH PPS REVENUE CREDIT

## 2022-07-02 PROCEDURE — 1090000002 HH PPS REVENUE DEBIT

## 2022-07-02 PROCEDURE — 1090000001 HH PPS REVENUE CREDIT

## 2022-07-03 PROCEDURE — 1090000001 HH PPS REVENUE CREDIT

## 2022-07-03 PROCEDURE — 1090000002 HH PPS REVENUE DEBIT

## 2022-07-04 ENCOUNTER — HOME CARE VISIT (OUTPATIENT)
Dept: SCHEDULING | Facility: HOME HEALTH | Age: 87
End: 2022-07-04
Payer: MEDICARE

## 2022-07-04 VITALS
OXYGEN SATURATION: 100 % | TEMPERATURE: 98.5 F | HEART RATE: 70 BPM | RESPIRATION RATE: 18 BRPM | DIASTOLIC BLOOD PRESSURE: 52 MMHG | SYSTOLIC BLOOD PRESSURE: 136 MMHG

## 2022-07-04 PROCEDURE — G0299 HHS/HOSPICE OF RN EA 15 MIN: HCPCS

## 2022-07-04 PROCEDURE — 1090000001 HH PPS REVENUE CREDIT

## 2022-07-04 PROCEDURE — 1090000002 HH PPS REVENUE DEBIT

## 2022-07-04 ASSESSMENT — ENCOUNTER SYMPTOMS: STOOL DESCRIPTION: FORMED

## 2022-07-05 PROCEDURE — 1090000002 HH PPS REVENUE DEBIT

## 2022-07-05 PROCEDURE — 1090000001 HH PPS REVENUE CREDIT

## 2022-07-06 PROCEDURE — 1090000002 HH PPS REVENUE DEBIT

## 2022-07-06 PROCEDURE — 1090000001 HH PPS REVENUE CREDIT

## 2022-07-07 ENCOUNTER — HOME CARE VISIT (OUTPATIENT)
Dept: SCHEDULING | Facility: HOME HEALTH | Age: 87
End: 2022-07-07
Payer: MEDICARE

## 2022-07-07 ENCOUNTER — HOSPITAL ENCOUNTER (OUTPATIENT)
Dept: WOUND CARE | Age: 87
Setting detail: RECURRING SERIES
Discharge: HOME | End: 2022-07-07
Payer: MEDICARE

## 2022-07-07 VITALS
SYSTOLIC BLOOD PRESSURE: 159 MMHG | TEMPERATURE: 97.6 F | WEIGHT: 157 LBS | BODY MASS INDEX: 27.82 KG/M2 | HEIGHT: 63 IN | HEART RATE: 68 BPM | DIASTOLIC BLOOD PRESSURE: 80 MMHG

## 2022-07-07 PROCEDURE — 1090000002 HH PPS REVENUE DEBIT

## 2022-07-07 PROCEDURE — 1090000001 HH PPS REVENUE CREDIT

## 2022-07-07 PROCEDURE — 29581 APPL MULTLAYER CMPRN SYS LEG: CPT

## 2022-07-07 PROCEDURE — 99214 OFFICE O/P EST MOD 30 MIN: CPT | Performed by: SURGERY

## 2022-07-07 PROCEDURE — 99212 OFFICE O/P EST SF 10 MIN: CPT

## 2022-07-07 NOTE — WOUND CARE
Discharge Instructions for  Greg Escobar  [address] [place] [address]  [address], [address]  Phone [phone]   Fax [phone]      NAME:  America Miller  YOB: 1935  MEDICAL RECORD NUMBER:  [id number]  DATE:  7/7/2022    Return Appointment:   2 weeks with Naomie Sanchez MD      Instructions: Right lower leg wound and right lateral foot:  Clean wound with saline. Hydrofera Ready: Cut to wound size, place in wound bed, shiny side out. Cover with ABD  Apply zinc cream to periwound. 2 layer compression with wound and lower extremity assessment.  If there is any problem with the dressing (too tight, slides down, etc.) Patient to return to wound clinic to have re-wrapped by clinician. Dressings to be changed on Monday and Thursday.     Apply nystatin powder or over the counter medicated gold bond in between toes daily  HH to apply when changing RLE wrap        Home health to change dressing 2x weekly. Be careful not to wrap compression too tight. New wound noted at lateral foot and non-blanchable erythema at anterior shin. Additional padding added to reddened areas.     Do not get wrap wet in shower. May purchase cast cover from [employer] to keep dry in the shower. Should you experience increased redness, swelling, pain, foul odor, size of wound(s), or have a temperature over 101 degrees please contact the [address] [place] [address] Road at [phone] or if after hours contact your primary care physician or go to the hospital emergency department. PLEASE NOTE: IF YOU ARE UNABLE TO OBTAIN WOUND SUPPLIES, CONTINUE TO USE THE SUPPLIES YOU HAVE AVAILABLE UNTIL YOU ARE ABLE TO REACH US. IT IS MOST IMPORTANT TO KEEP THE WOUND COVERED AT ALL TIMES.     Electronically signed Reno Nieves RN on 7/7/2022 at 11:05 AM

## 2022-07-07 NOTE — WOUND CARE
[facility] Ostomy Referral Follow-up Note        [address]  AGE: 86 y. o.   GENDER:  female  :  1935  TODAY'S DATE:  2022     Subjective: Radha Patient a 81 y. o. female who presents today for Ostomy evaluation. Pt has history of bladder cancer and had bladder removed and urostomy placed about 15 years ago. Since then had no issues with pouching and was able to get a wear time of about 4-5 days without issues. Then about 1 year ago patient states they discontinued the type of pouch she was using and has had issues with pouch staying on, on and off for about a year. 22:  Pt presents today with issues keeping pouch on for more than 1 day and peristomal irration. Pt's daughter has been crusting with stoma powder and non sting skin prep pads and using pre-cut rustam convex 2 piece urostomy pouches. Removed old pouch and noted partial thickness skin loss on peristomal skin from 9 o'clock to 2 o'clock. Recommend crusting no more than 3 layers and if urine leaks during crusting do not re-crust area just re-apply skin prep to that area and let dry prior to applying the pouch. Recommend to continue use of scooter rings to give added convexity to pouch and achieve better seal. Daughter verbalized understanding. Plan for follow up next week to eval plan and peristomal skin at that time.   22  Pt presents today stating that she has been able to get 3 days wear time with pouches now and has continued crusting and using scooter ring to add convexity. Removed pouch today noted peristomal irritation now only from 8 o'clock to 3 o'clock and more superficial open areas than previously noted. Cleansed frandy stomal skin with warm water and washcloth and applied 2 layers of crusting, then applied new pouch, held in place for 5 mins. Recommend to continue crusting and scooter ring until peristomal skin is completely healed.  Recommend to follow up in 2-3 weeks to re-eval peristomal skin and adjust pouching if needed. Follow up earlier if noted a decline in pouching. 7/7/22:  Pt presents today stating that since being started on lasix patient is having irritation around stoma due to increase in urine output. Recommend to start Flonase along with crusting to heal peristomal skin. Also do not recommend leaving pouch on for longer than 3 days at a time. Pt's daughter going out of town, pt has home health visits for RLE wounds, recommend to ask home health if they would be able to assist with pouching while daughter is out of town. Pt to follow up in 2 weeks to re-eval ostomy.      Patient Referred to [facility] by:  []?? Primary Care Physician  []? ? Surgeon  []? ? Advanced Practice Nurse  []? ? Hospitalist  []?? PA  [x]? ? Other:       Objective   PAST MEDICAL HISTORY  Past Medical History           Past Medical History:   Diagnosis Date    Arthritis      [facility]      Diabetes ([facility]      Hypertension              PAST SURGICAL HISTORY  Past Surgical History             Past Surgical History:   Procedure Laterality Date    ANTERIOR CRUCIATE LIGAMENT REPAIR        CATARACT REMOVAL Left      CHOLECYSTECTOMY        GYN        HX CYSTECTOMY   2008     Bladder removal wears a bag    HYSTERECTOMY, VAGINAL        ORTHOPEDIC SURGERY        TOE AMPUTATION   06/2021     pt had a hangmail which infected            ALLERGIES          Allergies   Allergen Reactions    Penicillins Rash         Assessment   Surgeon      Ostomy Type:  []?? Ileostomy  []? ? Colostomy   [x]? ? Urostomy  []? ? Other:      Date of Surgical Ostomy Procedure 15 years ago     Ht 5' 0.02\" (1.525 m)   Wt 152 lb (68.9 kg)   BMI 29.67 kg/m²            Wt Readings from Last 3 Encounters:   06/02/22 152 lb (68.9 kg)   05/20/22 152 lb (68.9 kg)         Plan:      Educated during this visit: Wilfrid Kwong all that apply):  [x]? ? Patient   []? ? Significant to:  [x]? ? Indicates understanding                []? ? Needs reinforcement  []? ? Unsuccessful                                 [x]? ? Verbal Understanding  []? ? Demonstrated understanding        []? ? No evidence of learning  []? ? Refused teaching                           []? ? Other     Plan for Ostomy follow-up care:  2 weeks     Ostomy Plan of Care  []? ? Supplies/Instructions given to patient/caregiver  []? ? Patient using home supplies  [x]? ? Brand/supplies Thu two piece convex urostomy pouch with scooter ring  Non sting skin prep and stoma powder     Electronically signed Mati Mcclellan RN, CWOCN on 7/7/22

## 2022-07-07 NOTE — PROGRESS NOTES
[address] [place] [address]  Consult Note/H&P/Progress Note      [address] RECORD NUMBER:  [id number]  AGE: 81 y.o. RACE White (non-)  GENDER: female  : 1935  EPISODE DATE:  2022       Subjective:      CC (Chief Complaint) and HISTORY of PRESENT ILLNESS (HPI):    Birdie Lakhani is a 81 y.o. female who presents today for wound/ulcer evaluation. Her first visit to the wound center with us was on 22   She reported progressive ulcerations and blistering of her right lower extremity since approximately 2021. Nothing in particular made her condition better or worse. She associated the condition with use of a venous thrombosis pump which was placed on her leg in the summer 2021.    No associated fevers      She has been diabetic for approximately 20 years         This information was obtained from the patient  The following HPI elements were documented for the patient's wound:  Location:  Right lower extremity ulcers  Duration: Since approximately 2021  Severity : Moderate severity   Context: History of diabetes mellitus for 20 years   Modifying Factors:  Nothing makes better or worse   Quality: No reported history of DVT   Timing:     Associated Signs and Symptoms : No fevers         Ulcer Identification:   Ulcer Type: venous      Contributing Factors: venous stasis, lymphedema and diabetes                PAST MEDICAL HISTORY  Past Medical History:   Diagnosis Date    Arthritis     Cancer ([address]     Diabetes ([address]     History of urostomy     Hypertension         PAST SURGICAL HISTORY  Past Surgical History:   Procedure Laterality Date    ANTERIOR CRUCIATE LIGAMENT REPAIR      CATARACT REMOVAL Left     CHOLECYSTECTOMY      GYN      HX CYSTECTOMY  2008    Bladder removal wears a bag    HYSTERECTOMY, VAGINAL      ORTHOPEDIC SURGERY      TOE AMPUTATION  2021    pt had a hangmail which infected FAMILY HISTORY  Family History   Problem Relation Age of Onset    Diabetes Sister     Heart Disease Sister     Heart Disease Father        SOCIAL HISTORY  Social History     Tobacco Use    Smoking status: Never Smoker    Smokeless tobacco: Never Used   Vaping Use    Vaping Use: Never used   Substance Use Topics    Alcohol use: No    Drug use: No       ALLERGIES  Allergies   Allergen Reactions    Penicillins Rash       MEDICATIONS  Current Outpatient Medications on File Prior to Encounter   Medication Sig Dispense Refill    Saccharomyces boulardii (DIGESTIVE PROBIOTIC PO) Take 1 tablet by mouth daily      Multiple Vitamin (MULTIVITAMIN PO) Take 1 tablet by mouth daily      atenolol (TENORMIN) 100 MG tablet Take 1 tablet by mouth daily 90 tablet 3    losartan (COZAAR) 50 mg tablet Take 1 tablet by mouth daily 90 tablet 3    metFORMIN (GLUCOPHAGE-XR) 500 mg extended release tablet Take 1 tablet by mouth 2 times daily 180 tablet 3    acetaminophen (TYLENOL) 500 MG tablet Take 500 mg by mouth every 6 hours as needed      Calcium Carb-Cholecalciferol 600-800 MG-UNIT CHEW Take 1 tablet by mouth daily      furosemide (LASIX) 20 MG tablet Take 20 mg by mouth daily      triamcinolone (KENALOG) 0.1 % cream Apply topically 2 times daily       No current facility-administered medications on file prior to encounter. REVIEW OF SYSTEMS  The patient has no difficulty with chest pain or shortness of breath. No fever or chills. Comprehensive review of systems was otherwise unremarkable except as noted above.       Objective:   Physical Exam:   Recent vitals (if inpt):  Patient Vitals for the past 24 hrs:   BP Temp Temp src Pulse Height Weight   07/07/22 1024 (!) 159/80 97.6 °F (36.4 °C) Temporal 68 -- --   07/07/22 1008 -- -- -- -- 5' 3\" (1.6 m) 157 lb (71.2 kg)       BP (!) 159/80   Pulse 68   Temp 97.6 °F (36.4 °C) (Temporal)   Ht 5' 3\" (1.6 m)   Wt 157 lb (71.2 kg)   BMI 27.81 kg/m²   Wt Readings from Last 3 Encounters:   07/07/22 157 lb (71.2 kg)   06/23/22 160 lb (72.6 kg)   06/16/22 156 lb (70.8 kg)     Constitutional: Alert, oriented, cooperative patient in no acute distress; appears stated age;  General appearance is within normal limits for wound care patient population. See the today's recorded vitals signs and constitutional data. Eyes: Pupils equal; Sclera are clear. EOMs intact; The eyes appear to track and move normally. The sclera are not injected. The conjunctive are clear. The eyelids are normal. There is no scleral icterus. ENMT: There are no obvious external ear, nose, lip or mouth lesions. Nares normal; Neck: Overall contour of the neck is normal with no obvious neck masses. Gross hearing is within normal limits. No obvious neck masses  Resp:  Breathing is non-labored; normal rate and effort; no audible wheezing. CV: RRR;  no JVD; No evidence of cyanosis of the upper extremities. The extremities are perfused without embolic sign, splinter hemorrhages, or petechia. GI: soft and non-distended without acute abnormality noted. Musculoskeletal: unremarkable with normal function. No embolic signs or cyanosis. Neuro:  Oriented; moves all 4; no focal deficits  Psychiatric: Judgement and insight are within normal limits for the wound care population of patients. Patient is oriented to person, place, and time. Recent and remote memory are within normal limits. Mood and affect are within normal limits. Integumentary (Skin/Wounds)  See inspection of wound(s) below. There are no other skin areas of palpable concern. See wound center documentation including photos in the [address] Wound Template made part of this record by reference.          Wound Care Documentation:    Wound 04/08/22 Pretibial Right;Medial;Proximal #1 (Active)   Wound Image   07/07/22 1026   Wound Etiology Venous 07/07/22 1026   Dressing Status Intact 07/07/22 1026   Wound Cleansed Soap and water 07/07/22 1026   Dressing/Treatment Hydrofera blue 07/07/22 1026   Wound Length (cm) 0.5 cm 07/07/22 1026   Wound Width (cm) 2 cm 07/07/22 1026   Wound Depth (cm) 0.1 cm 07/07/22 1026   Wound Surface Area (cm^2) 1 cm^2 07/07/22 1026   Change in Wound Size % (l*w) 54.55 07/07/22 1026   Wound Volume (cm^3) 0.1 cm^3 07/07/22 1026   Wound Healing % 55 07/07/22 1026   Wound Assessment Eschar dry 07/07/22 1026   Drainage Amount Scant 07/07/22 1026   Drainage Description Serosanguinous 07/07/22 1026   Odor None 07/07/22 1026   Megan-wound Assessment Fragile 07/07/22 1026   Margins Defined edges 07/04/22 1628   Wound Thickness Description not for Pressure Injury Full thickness 07/07/22 1026   Number of days: 89       Wound 06/16/22 Foot Right;Lateral (Active)   Wound Image   07/07/22 1026   Wound Etiology Pressure Stage 2 07/07/22 1026   Dressing Status Intact 07/07/22 1026   Wound Cleansed Soap and water 07/07/22 1026   Dressing/Treatment Hydrofera blue 07/07/22 1026   Wound Length (cm) 0 cm 07/07/22 1026   Wound Width (cm) 0 cm 07/07/22 1026   Wound Depth (cm) 0 cm 07/07/22 1026   Wound Surface Area (cm^2) 0 cm^2 07/07/22 1026   Change in Wound Size % (l*w) 100 07/07/22 1026   Wound Volume (cm^3) 0 cm^3 07/07/22 1026   Wound Healing % 100 07/07/22 1026   Wound Assessment Epithelialization 07/07/22 1026   Drainage Amount None 07/07/22 1026   Drainage Description Serosanguinous 07/04/22 1628   Odor None 07/07/22 1026   Megan-wound Assessment Dry/flaky 07/07/22 1026   Margins Defined edges 07/04/22 1628   Wound Thickness Description not for Pressure Injury Partial thickness 07/04/22 1628   Number of days: 20                                                                                              Amount and/or Complexity of Data Reviewed and Analyzed:  I reviewed and analyzed all of the unique labs and radiologic studies that are shown below as well as any that are in the HPI, and any that are in the expanded problem list below  *Each unique test, order, or document contributes to the combination of 2 or combination of 3 in Category 1 below. I also independently reviewed radiology images for studies I described above in the HPI or studies I have ordered. For this visit I also reviewed old records and prior notes. No results for input(s): WBC, HGB, PLT, NA, K, CL, CO2, BUN, CREA, GLU, INR, APTT, ALT, AML, AML, LCAD, PCO2, PO2, HCO3 in the last 72 hours. Invalid input(s): PTP, TBIL, TBILI, CBIL, SGOT, GPT, AP, LPSE, NH4, TROPT, TROIQ,  PH  No results for input(s): INR, APTT, ALT, AML in the last 72 hours.     Invalid input(s): PTP, TBIL, TBILI, CBIL, SGOT, GPT, AP, LPSE    Most recent blood counts (CBC) review  Lab Results   Component Value Date    WBC 6.5 04/06/2022    HGB 10.3 (L) 04/06/2022    HCT 32.3 (L) 04/06/2022    MCV 93 04/06/2022     04/06/2022     Most recent chemistry (BMP) test review   Lab Results   Component Value Date/Time     04/06/2022 04:31 PM    K 4.8 04/06/2022 04:31 PM     04/06/2022 04:31 PM    CO2 19 04/06/2022 04:31 PM    BUN 30 04/06/2022 04:31 PM    CREATININE 1.38 04/06/2022 04:31 PM    GLUCOSE 119 04/06/2022 04:31 PM    CALCIUM 9.7 04/06/2022 04:31 PM      Most recent Liver enzyme test review  Lab Results   Component Value Date    ALT 13 04/06/2022    AST 26 04/06/2022    ALKPHOS 104 04/06/2022    BILITOT 0.3 04/06/2022     Most recent coagulation (coags) review  @lastinr@  No results found for: INR, APTT, AML, AML, LCAD    Diabetic assessment  Lab Results   Component Value Date    LABA1C 6.9 (H) 04/06/2022     Lab Results   Component Value Date     04/06/2022       Nutritional assessment screen to assess wound healing ability:  Lab Results   Component Value Date    LABALBU 4.0 04/06/2022     No results found for: TP, ALBR, ALB    @lastXray@  @lastCT@      Admission date (for inpatients): 7/7/2022   Day of Surgery  * No procedures listed *    Assessment:      Problem List Items Addressed This Visit     None          [unfilled]    Active Problems:    * No active hospital problems. *  Resolved Problems:    * No resolved hospital problems. 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        History of bladder cancer 07/28/2020    Retinal edema 02/28/2020    Intermediate stage nonexudative age-related macular degeneration of left eye 02/28/2020    Arthritis 12/13/2018     Lower back        Essential hypertension 12/13/2018    Type 2 diabetes mellitus with skin complication, without long-term current use of insulin ([facility] 12/13/2018           Plan:     Number and Complexity of Problems addressed and   Risks of complications and/or morbidity of management    Treatment Note please see attached Discharge Instructions  Any procedures done today in the wound center (if documented in a separate note) in [facility] are made part of this record by reference  Written patient dismissal instructions given to patient or POA. Right lower extremity venous stasis ulcers  Weekly multilayer compression dressings. We encouraged elevation  We encouraged her to purchase a cast cover from [employer] to keep the dressing dry.   Continue Hydrofera applications to wound under compression   Apply zinc oxide to pretibial shin where there is some contact dermatitis from bandaging  She has home health    Consider wound biopsy to rule out occult malignancy      Right lateral forefoot pressure ulcer from bandage new on 6/16/2022  Most recent dressing change done by home health  Apply Hydrofera Blue's and wrap in that area. Cushion and pad that area to distribute pressure pressure better  Asked patient to offload more to prevent pressurization of lateral forefoot with ambulation       Venous dermatitis   Weekly Desitin      Lymphedema   Elevation   Salt avoidance   Compression   Weight loss         DM2 with HgbA1c 6.9 on 4/6/22   Encourage adherence to diabetic diet and diabetic medical regimen to help facilitate wound healing   Encourage close follow-up with primary care physician   Encourage HgbA1c q3 months         BMI>30-->29   Encourage weight loss       Treatment significantly limited by social determinants of health  The patient so far has not been able to be compliant with significant reduction in her BMI   which is exacerbating lymphedema and impairing her ability   to treat her dermatitis, venous hypertension, and chronic venous leg ulcers                          Wound Care  No orders of the defined types were placed in this encounter. [unfilled]            Level of MDM (2/3 elements below)  Number and Complexity of Problems Addressed Amount and/or Complexity of Data to be Reviewed and Analyzed  *Each unique test, order, or document contributes to the combination of 2 or combination of 3 in Category 1 below.  Risk of Complications and/or Morbidity or Mortality of pt Management     76896  11118 SF Minimal  1self-limited or minor problem Minimal or none Minimal risk of morbidity from additional diagnostic testing or Rx   62114  20081 Low Low  2or more self-limited or minor problems;    or  1stable chronic illness;    or  3CGPCG, uncomplicated illness or injury   Limited  (Must meet the requirements of at least 1 of the 2 categories)  Category 1: Tests and documents   Any combination of 2 from the following:  Review of prior external note(s) from each unique source*;  review of the result(s) of each unique test*;   ordering of each unique test*    or   Category 2: Assessment requiring an independent historian(s)  (For the categories of independent interpretation of tests and discussion of management or test interpretation, see moderate or high) Low risk of morbidity from additional diagnostic testing or treatment     32280  84893 Mod Moderate  1or more chronic illnesses with exacerbation, progression, or side effects of treatment;    or  2or more stable chronic illnesses;    or  1undiagnosed new problem with uncertain prognosis;    or  1acute illness with systemic symptoms;    or  2LPSHO complicated injury   Moderate  (Must meet the requirements of at least 1 out of 3 categories)  Category 1: Tests, documents, or independent historian(s)  Any combination of 3 from the following:   Review of prior external note(s) from each unique source*;  Review of the result(s) of each unique test*;  Ordering of each unique test*;  Assessment requiring an independent historian(s)    or  Category 2: Independent interpretation of tests   Independent interpretation of a test performed by another physician/other qualified health care professional (not separately reported);     or  Category 3: Discussion of management or test interpretation  Discussion of management or test interpretation with external physician/other qualified health care professional/appropriate source (not separately reported)   Moderate risk of morbidity from additional diagnostic testing or treatment  Examples only:  Prescription drug management -Hydrofera   Decision regarding minor surgery with identified patient or procedure risk factors  Decision regarding elective major surgery without identified patient or procedure risk factors   Diagnosis or treatment significantly limited by social determinants of health       [id number] High High  1or more chronic illnesses with severe exacerbation, progression, or side effects of treatment;    or  1 acute or chronic illness or injury that poses a threat to life or bodily function   Extensive  (Must meet the requirements of at least 2 out of 3 categories)  Category 1: Tests, documents, or independent historian(s)  Any combination of 3 from the following:   Review of prior external note(s) from each unique source*;  Review of the result(s) of each unique test*;   Ordering of each unique test*;   Assessment requiring an independent historian(s)    or   Category 2: Independent interpretation of tests   Independent interpretation of a test performed by another physician/other qualified health care professional (not separately reported);     or  Category 3: Discussion of management or test interpretation  Discussion of management or test interpretation with external physician/other qualified health care professional/appropriate source (not separately reported)   High risk of morbidity from additional diagnostic testing or treatment  Examples only:  Drug therapy requiring intensive monitoring for toxicity  Decision regarding elective major surgery with identified patient or procedure risk factors  Decision regarding emergency major surgery  Decision regarding hospitalization  Decision not to resuscitate or to de-escalate care because of poor prognosis                 I have personally performed a face-to-face diagnostic evaluation and management  service on this patient. I have independently seen the patient. I have independently obtained the above history from the patient/family. I have independently examined the patient with above findings. I have independently reviewed data/labs for this patient and developed the above plan of care (MDM).       Electronically signed by Sammy Alcala MD on 7/7/2022 at 10:50 AM

## 2022-07-07 NOTE — FLOWSHEET NOTE
07/07/22 1026   Wound 06/16/22 Foot Right;Lateral   Date First Assessed/Time First Assessed: 06/16/22 1148   Present on Hospital Admission: Yes  Primary Wound Type: Pressure Injury  Location: Foot  Wound Location Orientation: Right;Lateral   Wound Image    Wound Etiology Pressure Stage 2   Dressing Status Intact   Wound Cleansed Soap and water   Dressing/Treatment Hydrofera blue   Wound Length (cm) 0 cm   Wound Width (cm) 0 cm   Wound Depth (cm) 0 cm   Wound Surface Area (cm^2) 0 cm^2   Change in Wound Size % (l*w) 100   Wound Volume (cm^3) 0 cm^3   Wound Healing % 100   Wound Assessment Epithelialization   Drainage Amount None   Odor None   Megan-wound Assessment Dry/flaky   Wound 04/08/22 Pretibial Right;Medial;Proximal #1   Date First Assessed/Time First Assessed: 04/08/22 1248   Present on Hospital Admission: Yes  Primary Wound Type: Venous Ulcer  Location: Pretibial  Wound Location Orientation: Right;Medial;Proximal  Wound Description (Comments): #1   Wound Image    Wound Etiology Venous   Dressing Status Intact   Wound Cleansed Soap and water   Dressing/Treatment Hydrofera blue   Wound Length (cm) 0.5 cm   Wound Width (cm) 2 cm   Wound Depth (cm) 0.1 cm   Wound Surface Area (cm^2) 1 cm^2   Change in Wound Size % (l*w) 54.55   Wound Volume (cm^3) 0.1 cm^3   Wound Healing % 55   Wound Assessment Eschar dry   Drainage Amount Scant   Drainage Description Serosanguinous   Odor None   Megan-wound Assessment Fragile   Wound Thickness Description not for Pressure Injury Full thickness

## 2022-07-07 NOTE — WOUND CARE
Clinical Level of Care Assessment    Outpatient Ostomy Care      NAME:  Naima Dudley  YOB: 1935  MEDICAL RECORD NUMBER:  [id number]   DATE:  7/7/2022      Patient Tad Xiong Assessment- Document in Flowsheet I&O   Points   Review of chart []   0   Assess Complete Ostomy tab in Navigator for assessment of; stoma status, peristomal skin, presence of hernia/stool consistency/diet/related medications   Simple adjustments to pouch size/pouch system, new stoma pattern, accessory addition/deletion. [x]   1   Assess Complete Ostomy tab in Navigator for assessment of; stoma status, peristomal skin, presence of hernia/stool consistency/diet/related medications   Moderate adjustments to pouch size/pouch system, new stoma pattern, accessory addition/deletion. Observe patient/caregiver with hands-on care. 1-2 adjustments to pouch size/system/skin care/accessory addition or deletion. []   2   Assess Complete Ostomy tab in Navigator for assessment of; stoma status, peristomal skin, presence of hernia/stool consistency/diet/related medications   Complex adjustments to pouch size/pouch system, new stoma pattern, accessory addition/deletion. 3 or more complex adjustments to pouch size/system/skin care/accessory addition or deletion. Observe patient/caregiver with hands-on care. Assess patient/patient abdomen for optimal pre-marked stoma site. Assess patient abdomen for type of hernia belt/accessory needed. []   3         Ambulation Status Documented in [facility] Clinical Note  Status Definition Points   Independent Independently able to ambulate. Fully able (without any assistance) to get on/off exam table/chair. []   0   Minimal Physical Assistance Requires physical assistance of one person to ambulate and/or position patient to be examined. Includes necessary physical assistance to position lower extremities on/off stool.    [x]   1   Moderate Physical Assistance Requires at least one staff member to physically assist patient in ambulating into treatment room, and/or on off chair/bed. Requires assistance to bathroom. []   2   Full Assistance Requires assistance of at least two staff members to transfer patient into treatment room and/or on/off bed/chair. \"Total Transfer\". Unable to use bathroom requires bedside commode and/or bedpan []   3       Teaching Effort Documented in [facility] Clinical Note  Effort Definition Points   No Teaching  []   0   General Initial/Simple lesson:  Assess readiness to learn, assess patient learning style to determine educational flow/special needs for learning. Teaching related to 1-3 topics  Documentation in [facility] completed. [x]   1   Intermediate Assess readiness to learn, assess patient learning style to determine educational flow/special needs for learning. Teaching related to 3-4 topics. Hernia belt application and care considerations  Documentation in [facility] completed. []   2   Complex Assess readiness to learn, assess patient learning style to determine educational flow/special needs for learning. Teaching of greater than 5 additional topics   Pre-operative ostomy education with review of written resources for patient/family/caregiver as needed. Demonstration/return demonstration of ostomy irrigation  Documentation in [facility] completed. []   3     Patient Assessment and Planning in [facility] Clinical Note   Planning Definition Points   Simple Simple pouch change procedure completed and reviewed with patient/family/caregiver   Documentation in [facility] completed. []   1   Intermediate Moderate level of follow-up needs:   Pouch change/discharge procedure revised and reviewed with patient/caregiver. Communications with outside resources; i.e. Telephone calls to Surgeon/ PCP, family/caregiver, home health, ECF. Documentation in [facility] completed.      [x]   2   Complex Complex level of instructions/changes:   Family/Caregiver learning/demonstration/return demonstration visit. Pouching/discharge procedure revised/reviewed with patient/family/caregiver. Contact with outside resources; i.e. communication with Surgeon/ PCP, home health, ECF. Contact/referral to ostomy appliance supplier for new or additional products. Review when to call [facility] or schedule follow-up visit. Referral to Emergency Department   Documentation in [facility] completed. []   3       Is this the Patient's First Visit with [facility] @ [facility]?  no    Is this Patient Established to this [facility] within the last 3 years?    yes             Clinical Level of Care      Points  0-3  Level 1 []     Points  4-6  Level 2 [x]     Points  7-8  Level 3 []     Points  9-10  Level 4 []     Points  11-12  Level 5 []       Electronically signed by Lorenzo Leslie RN on 7/7/2022 at 10:54 AM

## 2022-07-07 NOTE — WOUND CARE
Multilayer Compression Wrap   (Not Unna) Below the Knee    NAME:  Hung Grewal  YOB: 1935  MEDICAL RECORD NUMBER:  [id number]  DATE:  7/7/2022    Multilayer compression wrap: Removed old Multilayer wrap if indicated and wash leg with mild soap/water. Applied moisturizing agent to dry skin as needed. Applied primary and secondary dressing as ordered. Applied multilayered dressing below the knee to right lower leg. Instructed patient/caregiver not to remove dressing and to keep it clean and dry. Instructed patient/caregiver on complications to report to provider, such as pain, numbness in toes, heavy drainage, and slippage of dressing. Instructed patient on purpose of compression dressing and on activity and exercise recommendations.       Electronically signed by Lorenzo Leslie RN on 7/7/2022 at 11:05 AM

## 2022-07-08 PROCEDURE — 1090000002 HH PPS REVENUE DEBIT

## 2022-07-08 PROCEDURE — 1090000001 HH PPS REVENUE CREDIT

## 2022-07-09 PROCEDURE — 1090000002 HH PPS REVENUE DEBIT

## 2022-07-09 PROCEDURE — 1090000001 HH PPS REVENUE CREDIT

## 2022-07-10 PROCEDURE — 1090000001 HH PPS REVENUE CREDIT

## 2022-07-10 PROCEDURE — 1090000002 HH PPS REVENUE DEBIT

## 2022-07-11 ENCOUNTER — HOME CARE VISIT (OUTPATIENT)
Dept: SCHEDULING | Facility: HOME HEALTH | Age: 87
End: 2022-07-11
Payer: MEDICARE

## 2022-07-11 VITALS
TEMPERATURE: 98.7 F | RESPIRATION RATE: 18 BRPM | DIASTOLIC BLOOD PRESSURE: 67 MMHG | SYSTOLIC BLOOD PRESSURE: 149 MMHG | OXYGEN SATURATION: 99 % | HEART RATE: 80 BPM

## 2022-07-11 PROCEDURE — 1090000002 HH PPS REVENUE DEBIT

## 2022-07-11 PROCEDURE — 400014 HH F/U

## 2022-07-11 PROCEDURE — 1090000001 HH PPS REVENUE CREDIT

## 2022-07-11 PROCEDURE — G0299 HHS/HOSPICE OF RN EA 15 MIN: HCPCS

## 2022-07-11 ASSESSMENT — ENCOUNTER SYMPTOMS: STOOL DESCRIPTION: FORMED

## 2022-07-12 PROCEDURE — 1090000002 HH PPS REVENUE DEBIT

## 2022-07-12 PROCEDURE — 1090000001 HH PPS REVENUE CREDIT

## 2022-07-13 PROCEDURE — 1090000001 HH PPS REVENUE CREDIT

## 2022-07-13 PROCEDURE — MED10308 ACCUWRAP,2 LAYER,COMPRESSION SYS

## 2022-07-13 PROCEDURE — 1090000002 HH PPS REVENUE DEBIT

## 2022-07-14 ENCOUNTER — HOME CARE VISIT (OUTPATIENT)
Dept: SCHEDULING | Facility: HOME HEALTH | Age: 87
End: 2022-07-14
Payer: MEDICARE

## 2022-07-14 VITALS
OXYGEN SATURATION: 97 % | DIASTOLIC BLOOD PRESSURE: 66 MMHG | SYSTOLIC BLOOD PRESSURE: 149 MMHG | RESPIRATION RATE: 18 BRPM | TEMPERATURE: 98.5 F | HEART RATE: 77 BPM

## 2022-07-14 PROCEDURE — 1090000001 HH PPS REVENUE CREDIT

## 2022-07-14 PROCEDURE — G0299 HHS/HOSPICE OF RN EA 15 MIN: HCPCS

## 2022-07-14 PROCEDURE — 1090000002 HH PPS REVENUE DEBIT

## 2022-07-14 ASSESSMENT — ENCOUNTER SYMPTOMS: STOOL DESCRIPTION: FORMED

## 2022-07-15 ENCOUNTER — HOME CARE VISIT (OUTPATIENT)
Dept: HOME HEALTH SERVICES | Facility: HOME HEALTH | Age: 87
End: 2022-07-15
Payer: MEDICARE

## 2022-07-15 PROCEDURE — 1090000001 HH PPS REVENUE CREDIT

## 2022-07-15 PROCEDURE — G0299 HHS/HOSPICE OF RN EA 15 MIN: HCPCS

## 2022-07-15 PROCEDURE — 1090000002 HH PPS REVENUE DEBIT

## 2022-07-16 PROCEDURE — 1090000002 HH PPS REVENUE DEBIT

## 2022-07-16 PROCEDURE — 1090000001 HH PPS REVENUE CREDIT

## 2022-07-17 ENCOUNTER — HOME CARE VISIT (OUTPATIENT)
Dept: SCHEDULING | Facility: HOME HEALTH | Age: 87
End: 2022-07-17
Payer: MEDICARE

## 2022-07-17 VITALS
SYSTOLIC BLOOD PRESSURE: 118 MMHG | HEART RATE: 76 BPM | DIASTOLIC BLOOD PRESSURE: 74 MMHG | TEMPERATURE: 98.7 F | RESPIRATION RATE: 18 BRPM

## 2022-07-17 VITALS
DIASTOLIC BLOOD PRESSURE: 80 MMHG | OXYGEN SATURATION: 94 % | TEMPERATURE: 99 F | RESPIRATION RATE: 17 BRPM | SYSTOLIC BLOOD PRESSURE: 120 MMHG | HEART RATE: 76 BPM

## 2022-07-17 PROCEDURE — G0299 HHS/HOSPICE OF RN EA 15 MIN: HCPCS

## 2022-07-17 PROCEDURE — 1090000001 HH PPS REVENUE CREDIT

## 2022-07-17 PROCEDURE — 1090000002 HH PPS REVENUE DEBIT

## 2022-07-18 ENCOUNTER — HOME CARE VISIT (OUTPATIENT)
Dept: SCHEDULING | Facility: HOME HEALTH | Age: 87
End: 2022-07-18
Payer: MEDICARE

## 2022-07-18 VITALS
OXYGEN SATURATION: 99 % | SYSTOLIC BLOOD PRESSURE: 137 MMHG | DIASTOLIC BLOOD PRESSURE: 72 MMHG | HEART RATE: 79 BPM | RESPIRATION RATE: 19 BRPM | TEMPERATURE: 98.6 F

## 2022-07-18 VITALS
OXYGEN SATURATION: 98 % | DIASTOLIC BLOOD PRESSURE: 63 MMHG | RESPIRATION RATE: 18 BRPM | SYSTOLIC BLOOD PRESSURE: 136 MMHG | HEART RATE: 64 BPM | TEMPERATURE: 98.7 F

## 2022-07-18 PROCEDURE — 1090000002 HH PPS REVENUE DEBIT

## 2022-07-18 PROCEDURE — 1090000001 HH PPS REVENUE CREDIT

## 2022-07-18 PROCEDURE — G0299 HHS/HOSPICE OF RN EA 15 MIN: HCPCS

## 2022-07-18 ASSESSMENT — ENCOUNTER SYMPTOMS: STOOL DESCRIPTION: FORMED

## 2022-07-19 ENCOUNTER — HOME CARE VISIT (OUTPATIENT)
Dept: SCHEDULING | Facility: HOME HEALTH | Age: 87
End: 2022-07-19
Payer: MEDICARE

## 2022-07-19 PROCEDURE — G0299 HHS/HOSPICE OF RN EA 15 MIN: HCPCS

## 2022-07-19 PROCEDURE — 1090000001 HH PPS REVENUE CREDIT

## 2022-07-19 PROCEDURE — 1090000002 HH PPS REVENUE DEBIT

## 2022-07-20 ENCOUNTER — HOME CARE VISIT (OUTPATIENT)
Dept: SCHEDULING | Facility: HOME HEALTH | Age: 87
End: 2022-07-20
Payer: MEDICARE

## 2022-07-20 VITALS
HEART RATE: 65 BPM | OXYGEN SATURATION: 98 % | TEMPERATURE: 98.2 F | DIASTOLIC BLOOD PRESSURE: 70 MMHG | SYSTOLIC BLOOD PRESSURE: 161 MMHG

## 2022-07-20 PROCEDURE — 1090000002 HH PPS REVENUE DEBIT

## 2022-07-20 PROCEDURE — 1090000001 HH PPS REVENUE CREDIT

## 2022-07-20 PROCEDURE — G0299 HHS/HOSPICE OF RN EA 15 MIN: HCPCS

## 2022-07-21 ENCOUNTER — HOSPITAL ENCOUNTER (OUTPATIENT)
Dept: WOUND CARE | Age: 87
Setting detail: RECURRING SERIES
Discharge: HOME | End: 2022-07-21
Payer: MEDICARE

## 2022-07-21 ENCOUNTER — HOME CARE VISIT (OUTPATIENT)
Dept: SCHEDULING | Facility: HOME HEALTH | Age: 87
End: 2022-07-21
Payer: MEDICARE

## 2022-07-21 VITALS — SYSTOLIC BLOOD PRESSURE: 163 MMHG | DIASTOLIC BLOOD PRESSURE: 74 MMHG | HEART RATE: 67 BPM

## 2022-07-21 PROCEDURE — 99214 OFFICE O/P EST MOD 30 MIN: CPT | Performed by: SURGERY

## 2022-07-21 PROCEDURE — 1090000002 HH PPS REVENUE DEBIT

## 2022-07-21 PROCEDURE — 29581 APPL MULTLAYER CMPRN SYS LEG: CPT

## 2022-07-21 PROCEDURE — 1090000001 HH PPS REVENUE CREDIT

## 2022-07-21 PROCEDURE — 99212 OFFICE O/P EST SF 10 MIN: CPT

## 2022-07-21 NOTE — PROGRESS NOTES
HISTORY  Family History   Problem Relation Age of Onset    Diabetes Sister     Heart Disease Sister     Heart Disease Father        SOCIAL HISTORY  Social History     Tobacco Use    Smoking status: Never    Smokeless tobacco: Never   Vaping Use    Vaping Use: Never used   Substance Use Topics    Alcohol use: No    Drug use: No       ALLERGIES  Allergies   Allergen Reactions    Penicillins Rash       MEDICATIONS  Current Outpatient Medications on File Prior to Encounter   Medication Sig Dispense Refill    Saccharomyces boulardii (DIGESTIVE PROBIOTIC PO) Take 1 tablet by mouth daily      Multiple Vitamin (MULTIVITAMIN PO) Take 1 tablet by mouth daily      atenolol (TENORMIN) 100 MG tablet Take 1 tablet by mouth daily 90 tablet 3    losartan (COZAAR) 50 mg tablet Take 1 tablet by mouth daily 90 tablet 3    metFORMIN (GLUCOPHAGE-XR) 500 mg extended release tablet Take 1 tablet by mouth 2 times daily 180 tablet 3    acetaminophen (TYLENOL) 500 MG tablet Take 500 mg by mouth every 6 hours as needed for Pain      Calcium Carb-Cholecalciferol 600-800 MG-UNIT CHEW Take 1 tablet by mouth daily      furosemide (LASIX) 20 MG tablet Take 20 mg by mouth daily      triamcinolone (KENALOG) 0.1 % cream Apply 1 each topically 2 times daily Apply to legs       No current facility-administered medications on file prior to encounter. REVIEW OF SYSTEMS  The patient has no difficulty with chest pain or shortness of breath. No fever or chills. Comprehensive review of systems was otherwise unremarkable except as noted above. Objective:   Physical Exam:   Recent vitals (if inpt):  No data found. There were no vitals taken for this visit.   Wt Readings from Last 3 Encounters:   07/07/22 157 lb (71.2 kg)   06/23/22 160 lb (72.6 kg)   06/16/22 156 lb (70.8 kg)     Constitutional: Alert, oriented, cooperative patient in no acute distress; appears stated age;  General appearance is within normal limits for wound care patient population. See the today's recorded vitals signs and constitutional data. Eyes: Pupils equal; Sclera are clear. EOMs intact; The eyes appear to track and move normally. The sclera are not injected. The conjunctive are clear. The eyelids are normal. There is no scleral icterus. ENMT: There are no obvious external ear, nose, lip or mouth lesions. Nares normal; Neck: Overall contour of the neck is normal with no obvious neck masses. Gross hearing is within normal limits. No obvious neck masses  Resp:  Breathing is non-labored; normal rate and effort; no audible wheezing. CV: RRR;  no JVD; No evidence of cyanosis of the upper extremities. The extremities are perfused without embolic sign, splinter hemorrhages, or petechia. GI: soft and non-distended without acute abnormality noted. Musculoskeletal: unremarkable with normal function. No embolic signs or cyanosis. Neuro:  Oriented; moves all 4; no focal deficits  Psychiatric: Judgement and insight are within normal limits for the wound care population of patients. Patient is oriented to person, place, and time. Recent and remote memory are within normal limits. Mood and affect are within normal limits. Integumentary (Skin/Wounds)  See inspection of wound(s) below. There are no other skin areas of palpable concern. See wound center documentation including photos in the [address] Wound Template made part of this record by reference. Wound Care Documentation:    Wound 04/08/22 Pretibial Right;Medial;Proximal #1 (Active)   Wound Image   07/21/22 1040   Wound Etiology Venous 07/21/22 1040   Dressing Status Intact 07/21/22 1040   Wound Cleansed Soap and water 07/21/22 1040   Dressing/Treatment ABD;Hydrofera blue;Zinc paste; Other (comment) 07/18/22 1449   Wound Length (cm) 1 cm 07/21/22 1040   Wound Width (cm) 2.5 cm 07/21/22 1040   Wound Depth (cm) 0.1 cm 07/21/22 1040   Wound Surface Area (cm^2) 2.5 cm^2 07/21/22 1040   Change in Wound Size % (l*w) -13.64 07/21/22 1040   Wound Volume (cm^3) 0.25 cm^3 07/21/22 1040   Wound Healing % -14 07/21/22 1040   Wound Assessment Epithelialization;Pink/red 07/21/22 1040   Drainage Amount Small 07/21/22 1040   Drainage Description Serosanguinous 07/21/22 1040   Odor None 07/21/22 1040   Megan-wound Assessment Intact 07/21/22 1040   Margins Defined edges 07/18/22 1449   Wound Thickness Description not for Pressure Injury Full thickness 07/21/22 1040   Number of days: 103       Wound 06/16/22 Foot Right;Lateral (Active)   Wound Image   07/21/22 1040   Wound Etiology Pressure Stage 2 07/21/22 1040   Dressing Status Intact 07/21/22 1040   Wound Cleansed Soap and water 07/21/22 1040   Dressing/Treatment ABD; Other (comment) 07/21/22 1040   Wound Length (cm) 0 cm 07/21/22 1040   Wound Width (cm) 0 cm 07/21/22 1040   Wound Depth (cm) 0 cm 07/21/22 1040   Wound Surface Area (cm^2) 0 cm^2 07/21/22 1040   Change in Wound Size % (l*w) 100 07/21/22 1040   Wound Volume (cm^3) 0 cm^3 07/21/22 1040   Wound Healing % 100 07/21/22 1040   Wound Assessment Dry 07/21/22 1040   Drainage Amount None 07/21/22 1040   Drainage Description Serosanguinous 07/04/22 1628   Odor None 07/21/22 1040   Megan-wound Assessment Dry/flaky 07/21/22 1040   Margins Defined edges 07/18/22 1449   Wound Thickness Description not for Pressure Injury Partial thickness 07/18/22 1449   Number of days: 34       Wound 07/18/22 Leg Lower;Right (Active)   Dressing Status New dressing applied 07/18/22 1449   Wound Cleansed Cleansed with saline 07/18/22 1449   Dressing/Treatment ABD;Hydrofera blue;Zinc paste; Other (comment) 07/18/22 1449   Wound Length (cm) 0.4 cm 07/18/22 1449   Wound Width (cm) 0.1 cm 07/18/22 1449   Wound Depth (cm) 0.1 cm 07/18/22 1449   Wound Surface Area (cm^2) 0.04 cm^2 07/18/22 1449   Wound Volume (cm^3) 0.004 cm^3 07/18/22 1449   Wound Assessment Pink/red 07/18/22 1449   Drainage Amount Scant 07/18/22 1449   Drainage Description Serosanguinous 07/18/22 1449   Odor None 07/18/22 1449   Megan-wound Assessment Dry/flaky;Edematous;Fragile 07/18/22 1449   Margins Defined edges 07/18/22 1449   Wound Thickness Description not for Pressure Injury Partial thickness 07/18/22 1449   Number of days: 3                                                                                                    Amount and/or Complexity of Data Reviewed and Analyzed:  I reviewed and analyzed all of the unique labs and radiologic studies that are shown below as well as any that are in the HPI, and any that are in the expanded problem list below  *Each unique test, order, or document contributes to the combination of 2 or combination of 3 in Category 1 below. I also independently reviewed radiology images for studies I described above in the HPI or studies I have ordered. For this visit I also reviewed old records and prior notes. No results for input(s): WBC, HGB, PLT, NA, K, CL, CO2, BUN, CREA, GLU, INR, APTT, ALT, AML, AML, LCAD, PCO2, PO2, HCO3 in the last 72 hours. Invalid input(s): PTP, TBIL, TBILI, CBIL, SGOT, GPT, AP, LPSE, NH4, TROPT, TROIQ,  PH  No results for input(s): INR, APTT, ALT, AML in the last 72 hours.     Invalid input(s): PTP, TBIL, TBILI, CBIL, SGOT, GPT, AP, LPSE    Most recent blood counts (CBC) review  Lab Results   Component Value Date    WBC 6.5 04/06/2022    HGB 10.3 (L) 04/06/2022    HCT 32.3 (L) 04/06/2022    MCV 93 04/06/2022     04/06/2022     Most recent chemistry (BMP) test review   Lab Results   Component Value Date/Time     04/06/2022 04:31 PM    K 4.8 04/06/2022 04:31 PM     04/06/2022 04:31 PM    CO2 19 04/06/2022 04:31 PM    BUN 30 04/06/2022 04:31 PM    CREATININE 1.38 04/06/2022 04:31 PM    GLUCOSE 119 04/06/2022 04:31 PM    CALCIUM 9.7 04/06/2022 04:31 PM      Most recent Liver enzyme test review  Lab Results   Component Value Date    ALT 13 04/06/2022    AST 26 04/06/2022    ALKPHOS 104 04/06/2022 BILITOT 0.3 04/06/2022     Most recent coagulation (coags) review  @lastinr@  No results found for: INR, APTT, AML, AML, LCAD    Diabetic assessment  Lab Results   Component Value Date    LABA1C 6.9 (H) 04/06/2022     Lab Results   Component Value Date     04/06/2022       Nutritional assessment screen to assess wound healing ability:  Lab Results   Component Value Date    LABALBU 4.0 04/06/2022     No results found for: TP, ALBR, ALB    @lastXray@  @lastCT@      Admission date (for inpatients): 7/21/2022   Day of Surgery  * No procedures listed *    Assessment:      Problem List Items Addressed This Visit    None      [unfilled]    Active Problems:    Dermatitis    Open wound of right foot    BMI 27.0-27.9,adult    Type 2 diabetes mellitus with skin complication, without long-term current use of insulin (HCC)    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        History of bladder cancer 07/28/2020    Retinal edema 02/28/2020    Intermediate stage nonexudative age-related macular degeneration of left eye 02/28/2020    Arthritis 12/13/2018     Lower back        Essential hypertension 12/13/2018    Type 2 diabetes mellitus with skin complication, without long-term current use of insulin ([facility] [address] 12/13/2018 Plan:     Number and Complexity of Problems addressed and   Risks of complications and/or morbidity of management    Treatment Note please see attached Discharge Instructions  Any procedures done today in the wound center (if documented in a separate note) in [facility] are made part of this record by reference  Written patient dismissal instructions given to patient or POA. Right lower extremity venous stasis ulcers  Weekly multilayer compression dressings. We encouraged elevation  We encouraged her to purchase a cast cover from [employer] to keep the dressing dry. Discontinued hydra fair to the right lower extremity venous ulcer on 7/21/2022 and converted to zinc patches under compression   Apply zinc oxide to pretibial shin where there is some contact dermatitis from bandaging  She has home health    Consider wound biopsy to rule out occult malignancy      Right lateral forefoot pressure ulcer from bandage new on 6/16/2022  Most recent dressing change done by home health  Apply Hydrofera Blue and wrap in that area.     Cushion and pad that area to distribute pressure pressure better  Asked patient to offload more to prevent pressurization of lateral forefoot with ambulation       Venous dermatitis   Weekly Desitin      Lymphedema   Elevation   Salt avoidance   Compression   Weight loss         DM2 with HgbA1c 6.9 on 4/6/22   Encourage adherence to diabetic diet and diabetic medical regimen to help facilitate wound healing   Encourage close follow-up with primary care physician   Encourage HgbA1c q3 months         BMI>30-->29   Encourage weight loss       Treatment significantly limited by social determinants of health  The patient so far has not been able to be compliant with significant reduction in her BMI   which is exacerbating lymphedema and impairing her ability   to treat her dermatitis, venous hypertension, and chronic venous leg ulcers                          Wound Care  No orders of the defined types were placed in this encounter. [unfilled]            Level of MDM (2/3 elements below)  Number and Complexity of Problems Addressed Amount and/or Complexity of Data to be Reviewed and Analyzed  *Each unique test, order, or document contributes to the combination of 2 or combination of 3 in Category 1 below. Risk of Complications and/or Morbidity or Mortality of pt Management     62207  12284 SF Minimal  ?1self-limited or minor problem Minimal or none Minimal risk of morbidity from additional diagnostic testing or Rx   69050  [id number] Low Low  ? 2or more self-limited or minor problems;    or  ? 1stable chronic illness;    or  ?1acute, uncomplicated illness or injury   Limited  (Must meet the requirements of at least 1 of the 2 categories)  Category 1: Tests and documents   ? Any combination of 2 from the following:  ?Review of prior external note(s) from each unique source*;  ?review of the result(s) of each unique test*;   ?ordering of each unique test*    or   Category 2: Assessment requiring an independent historian(s)  (For the categories of independent interpretation of tests and discussion of management or test interpretation, see moderate or high) Low risk of morbidity from additional diagnostic testing or treatment     26928  85065 Mod Moderate  ? 1or more chronic illnesses with exacerbation, progression, or side effects of treatment;    or  ?2or more stable chronic illnesses;    or  ?1undiagnosed new problem with uncertain prognosis;    or  ?1acute illness with systemic symptoms;    or  ?1acute complicated injury   Moderate  (Must meet the requirements of at least 1 out of 3 categories)  Category 1: Tests, documents, or independent historian(s)  ? Any combination of 3 from the following:   ?Review of prior external note(s) from each unique source*;  ?Review of the result(s) of each unique test*;  ?Ordering of each unique test*;  ?Assessment requiring an independent historian(s)    or  Category 2: Independent interpretation of tests   ? Independent interpretation of a test performed by another physician/other qualified health care professional (not separately reported);     or  Category 3: Discussion of management or test interpretation  ? Discussion of management or test interpretation with external physician/other qualified health care professional/appropriate source (not separately reported)   Moderate risk of morbidity from additional diagnostic testing or treatment  Examples only:  ?Prescription drug management -Hydrofera   ? Decision regarding minor surgery with identified patient or procedure risk factors  ? Decision regarding elective major surgery without identified patient or procedure risk factors   ? Diagnosis or treatment significantly limited by social determinants of health       76704  29375 High High  ? 1or more chronic illnesses with severe exacerbation, progression, or side effects of treatment;    or  ?1 acute or chronic illness or injury that poses a threat to life or bodily function   Extensive  (Must meet the requirements of at least 2 out of 3 categories)  Category 1: Tests, documents, or independent historian(s)  ? Any combination of 3 from the following:   ?Review of prior external note(s) from each unique source*;  ?Review of the result(s) of each unique test*;   ?Ordering of each unique test*;   ?Assessment requiring an independent historian(s)    or   Category 2: Independent interpretation of tests   ? Independent interpretation of a test performed by another physician/other qualified health care professional (not separately reported);     or  Category 3: Discussion of management or test interpretation  ? Discussion of management or test interpretation with external physician/other qualified health care professional/appropriate source (not separately reported)   High risk of morbidity from additional diagnostic testing or treatment  Examples only:  ?Drug therapy requiring intensive monitoring for toxicity  ? Decision regarding elective major surgery with identified patient or procedure risk factors  ? Decision regarding emergency major surgery  ? Decision regarding hospitalization  ? Decision not to resuscitate or to de-escalate care because of poor prognosis                 I have personally performed a face-to-face diagnostic evaluation and management  service on this patient. I have independently seen the patient. I have independently obtained the above history from the patient/family. I have independently examined the patient with above findings. I have independently reviewed data/labs for this patient and developed the above plan of care (MDM).       Electronically signed by Jose Hernandez MD on 7/21/2022 at 11:06 AM

## 2022-07-21 NOTE — WOUND CARE
Clinical Level of Care Assessment    Outpatient Ostomy Care      NAME:  Payal Lyons  YOB: 1935  MEDICAL RECORD NUMBER:  [id number]   DATE:  7/21/2022      Patient Ynes Bowden Assessment- Document in Flowsheet I&O   Points   Review of chart []   0   Assess Complete Ostomy tab in Navigator for assessment of; stoma status, peristomal skin, presence of hernia/stool consistency/diet/related medications   Simple adjustments to pouch size/pouch system, new stoma pattern, accessory addition/deletion. [x]   1   Assess Complete Ostomy tab in Navigator for assessment of; stoma status, peristomal skin, presence of hernia/stool consistency/diet/related medications   Moderate adjustments to pouch size/pouch system, new stoma pattern, accessory addition/deletion. Observe patient/caregiver with hands-on care. 1-2 adjustments to pouch size/system/skin care/accessory addition or deletion. []   2   Assess Complete Ostomy tab in Navigator for assessment of; stoma status, peristomal skin, presence of hernia/stool consistency/diet/related medications   Complex adjustments to pouch size/pouch system, new stoma pattern, accessory addition/deletion. 3 or more complex adjustments to pouch size/system/skin care/accessory addition or deletion. Observe patient/caregiver with hands-on care. Assess patient/patient abdomen for optimal pre-marked stoma site. Assess patient abdomen for type of hernia belt/accessory needed. []   3         Ambulation Status Documented in [facility] Clinical Note  Status Definition Points   Independent Independently able to ambulate. Fully able (without any assistance) to get on/off exam table/chair. []   0   Minimal Physical Assistance Requires physical assistance of one person to ambulate and/or position patient to be examined. Includes necessary physical assistance to position lower extremities on/off stool.    [x]   1   Moderate Physical Assistance Requires at least one staff member to physically assist patient in ambulating into treatment room, and/or on off chair/bed. Requires assistance to bathroom. []   2   Full Assistance Requires assistance of at least two staff members to transfer patient into treatment room and/or on/off bed/chair. \"Total Transfer\". Unable to use bathroom requires bedside commode and/or bedpan []   3       Teaching Effort Documented in [facility] Clinical Note  Effort Definition Points   No Teaching  []   0   General Initial/Simple lesson:  Assess readiness to learn, assess patient learning style to determine educational flow/special needs for learning. Teaching related to 1-3 topics  Documentation in [facility] completed. [x]   1   Intermediate Assess readiness to learn, assess patient learning style to determine educational flow/special needs for learning. Teaching related to 3-4 topics. Hernia belt application and care considerations  Documentation in [facility] completed. []   2   Complex Assess readiness to learn, assess patient learning style to determine educational flow/special needs for learning. Teaching of greater than 5 additional topics   Pre-operative ostomy education with review of written resources for patient/family/caregiver as needed. Demonstration/return demonstration of ostomy irrigation  Documentation in [facility] completed. []   3     Patient Assessment and Planning in [facility] Clinical Note   Planning Definition Points   Simple Simple pouch change procedure completed and reviewed with patient/family/caregiver   Documentation in [facility] completed. [x]   1   Intermediate Moderate level of follow-up needs:   Pouch change/discharge procedure revised and reviewed with patient/caregiver. Communications with outside resources; i.e. Telephone calls to Surgeon/ PCP, family/caregiver, home health, ECF. Documentation in [facility] completed.      []   2   Complex Complex level of instructions/changes:   Family/Caregiver learning/demonstration/return

## 2022-07-21 NOTE — WOUND CARE
Multilayer Compression Wrap   (Not Unna) Below the Knee    NAME:  Rashad Teresa  YOB: 1935  MEDICAL RECORD NUMBER:  [id number]  DATE:  7/21/2022    Multilayer compression wrap: Removed old Multilayer wrap if indicated and wash leg with mild soap/water. Applied moisturizing agent to dry skin as needed. Applied primary and secondary dressing as ordered. Applied multilayered dressing below the knee to right lower leg. Instructed patient/caregiver not to remove dressing and to keep it clean and dry. Instructed patient/caregiver on complications to report to provider, such as pain, numbness in toes, heavy drainage, and slippage of dressing. Instructed patient on purpose of compression dressing and on activity and exercise recommendations.       Electronically signed by Clifford Epperson RN on 7/21/2022 at 11:18 AM

## 2022-07-21 NOTE — FLOWSHEET NOTE
07/21/22 1040   Wound 06/16/22 Foot Right;Lateral   Date First Assessed/Time First Assessed: 06/16/22 1148   Present on Hospital Admission: Yes  Primary Wound Type: Pressure Injury  Location: Foot  Wound Location Orientation: Right;Lateral   Wound Image    Wound Etiology Pressure Stage 2   Dressing Status Intact   Wound Cleansed Soap and water   Dressing/Treatment ABD; Other (comment)   Wound Length (cm) 0 cm   Wound Width (cm) 0 cm   Wound Depth (cm) 0 cm   Wound Surface Area (cm^2) 0 cm^2   Change in Wound Size % (l*w) 100   Wound Volume (cm^3) 0 cm^3   Wound Healing % 100   Wound Assessment Dry   Drainage Amount None   Odor None   Megan-wound Assessment Dry/flaky   Wound 04/08/22 Pretibial Right;Medial;Proximal #1   Date First Assessed/Time First Assessed: 04/08/22 1248   Present on Hospital Admission: Yes  Primary Wound Type: Venous Ulcer  Location: Pretibial  Wound Location Orientation: Right;Medial;Proximal  Wound Description (Comments): #1   Wound Image    Wound Etiology Venous   Dressing Status Intact   Wound Cleansed Soap and water   Offloading for Diabetic Foot Ulcers   (hydrofera ready)   Wound Length (cm) 1 cm   Wound Width (cm) 2.5 cm   Wound Depth (cm) 0.1 cm   Wound Surface Area (cm^2) 2.5 cm^2   Change in Wound Size % (l*w) -13.64   Wound Volume (cm^3) 0.25 cm^3   Wound Healing % -14   Wound Assessment Epithelialization;Pink/red   Drainage Amount Small   Drainage Description Serosanguinous   Odor None   Megan-wound Assessment Intact   Wound Thickness Description not for Pressure Injury Full thickness

## 2022-07-21 NOTE — WOUND CARE
Discharge Instructions for  Greg Escobar  [address] [place] [address]  [address], [address]  Phone [phone]   Fax [phone]      NAME:  America Miller  YOB: 1935  MEDICAL RECORD NUMBER:  [id number]  DATE:  7/21/2022    Return Appointment:   2 weeks with Naomie Sanchez MD      Instructions:   Right lateral foot:  Cleanse with normal saline apply ABD pad over and 2 layer wrap    Right medial leg:  Cleanse entire leg with soap and water apply zinc paste patch over wound cover with ABD pad   2 layer compression with wound and lower extremity assessment. If there is any problem with the dressing (too tight, slides down, etc.) Patient to return to wound clinic to have re-wrapped by clinician. Change 2 times per week by [facility]    Bring compression socks to next visit        Should you experience increased redness, swelling, pain, foul odor, size of wound(s), or have a temperature over 101 degrees please contact the [address] [place] [address] Road at [phone] or if after hours contact your primary care physician or go to the hospital emergency department. PLEASE NOTE: IF YOU ARE UNABLE TO OBTAIN WOUND SUPPLIES, CONTINUE TO USE THE SUPPLIES YOU HAVE AVAILABLE UNTIL YOU ARE ABLE TO REACH US. IT IS MOST IMPORTANT TO KEEP THE WOUND COVERED AT ALL TIMES.     Electronically signed Reno Nieves RN on 7/21/2022 at 11:16 AM

## 2022-07-22 PROCEDURE — 1090000001 HH PPS REVENUE CREDIT

## 2022-07-22 PROCEDURE — 1090000002 HH PPS REVENUE DEBIT

## 2022-07-23 PROCEDURE — 1090000002 HH PPS REVENUE DEBIT

## 2022-07-23 PROCEDURE — 1090000001 HH PPS REVENUE CREDIT

## 2022-07-24 PROCEDURE — 1090000001 HH PPS REVENUE CREDIT

## 2022-07-24 PROCEDURE — 1090000002 HH PPS REVENUE DEBIT

## 2022-07-25 ENCOUNTER — HOME CARE VISIT (OUTPATIENT)
Dept: SCHEDULING | Facility: HOME HEALTH | Age: 87
End: 2022-07-25
Payer: MEDICARE

## 2022-07-25 VITALS
SYSTOLIC BLOOD PRESSURE: 145 MMHG | RESPIRATION RATE: 18 BRPM | DIASTOLIC BLOOD PRESSURE: 61 MMHG | OXYGEN SATURATION: 99 % | HEART RATE: 67 BPM | TEMPERATURE: 98.5 F

## 2022-07-25 PROCEDURE — 1090000002 HH PPS REVENUE DEBIT

## 2022-07-25 PROCEDURE — 1090000001 HH PPS REVENUE CREDIT

## 2022-07-25 PROCEDURE — G0299 HHS/HOSPICE OF RN EA 15 MIN: HCPCS

## 2022-07-25 ASSESSMENT — ENCOUNTER SYMPTOMS: PAIN LOCATION - PAIN QUALITY: ACHING

## 2022-07-26 PROCEDURE — 1090000002 HH PPS REVENUE DEBIT

## 2022-07-26 PROCEDURE — 1090000001 HH PPS REVENUE CREDIT

## 2022-07-27 VITALS
DIASTOLIC BLOOD PRESSURE: 76 MMHG | HEART RATE: 68 BPM | TEMPERATURE: 98.5 F | OXYGEN SATURATION: 99 % | RESPIRATION RATE: 18 BRPM | SYSTOLIC BLOOD PRESSURE: 140 MMHG

## 2022-07-27 PROCEDURE — 1090000001 HH PPS REVENUE CREDIT

## 2022-07-27 PROCEDURE — 1090000002 HH PPS REVENUE DEBIT

## 2022-07-27 ASSESSMENT — ENCOUNTER SYMPTOMS: STOOL DESCRIPTION: FORMED

## 2022-07-28 ENCOUNTER — HOME CARE VISIT (OUTPATIENT)
Dept: SCHEDULING | Facility: HOME HEALTH | Age: 87
End: 2022-07-28
Payer: MEDICARE

## 2022-07-28 VITALS
OXYGEN SATURATION: 97 % | HEART RATE: 73 BPM | SYSTOLIC BLOOD PRESSURE: 114 MMHG | TEMPERATURE: 98.6 F | RESPIRATION RATE: 18 BRPM | DIASTOLIC BLOOD PRESSURE: 48 MMHG

## 2022-07-28 PROCEDURE — 1090000001 HH PPS REVENUE CREDIT

## 2022-07-28 PROCEDURE — G0299 HHS/HOSPICE OF RN EA 15 MIN: HCPCS

## 2022-07-28 PROCEDURE — 1090000002 HH PPS REVENUE DEBIT

## 2022-07-28 ASSESSMENT — ENCOUNTER SYMPTOMS: STOOL DESCRIPTION: FORMED

## 2022-07-29 PROCEDURE — 1090000002 HH PPS REVENUE DEBIT

## 2022-07-29 PROCEDURE — 1090000001 HH PPS REVENUE CREDIT

## 2022-07-30 PROCEDURE — 1090000002 HH PPS REVENUE DEBIT

## 2022-07-30 PROCEDURE — 1090000001 HH PPS REVENUE CREDIT

## 2022-07-31 PROCEDURE — 1090000001 HH PPS REVENUE CREDIT

## 2022-07-31 PROCEDURE — 1090000002 HH PPS REVENUE DEBIT

## 2022-08-01 ENCOUNTER — HOME CARE VISIT (OUTPATIENT)
Dept: SCHEDULING | Facility: HOME HEALTH | Age: 87
End: 2022-08-01
Payer: MEDICARE

## 2022-08-01 VITALS
TEMPERATURE: 98.4 F | HEART RATE: 71 BPM | RESPIRATION RATE: 18 BRPM | OXYGEN SATURATION: 99 % | DIASTOLIC BLOOD PRESSURE: 60 MMHG | SYSTOLIC BLOOD PRESSURE: 136 MMHG

## 2022-08-01 PROCEDURE — 1090000002 HH PPS REVENUE DEBIT

## 2022-08-01 PROCEDURE — 1090000001 HH PPS REVENUE CREDIT

## 2022-08-01 PROCEDURE — G0299 HHS/HOSPICE OF RN EA 15 MIN: HCPCS

## 2022-08-01 ASSESSMENT — ENCOUNTER SYMPTOMS: STOOL DESCRIPTION: FORMED

## 2022-08-02 PROCEDURE — 1090000002 HH PPS REVENUE DEBIT

## 2022-08-02 PROCEDURE — 1090000001 HH PPS REVENUE CREDIT

## 2022-08-03 PROCEDURE — 1090000002 HH PPS REVENUE DEBIT

## 2022-08-03 PROCEDURE — 1090000001 HH PPS REVENUE CREDIT

## 2022-08-04 ENCOUNTER — HOME CARE VISIT (OUTPATIENT)
Dept: SCHEDULING | Facility: HOME HEALTH | Age: 87
End: 2022-08-04
Payer: MEDICARE

## 2022-08-04 PROCEDURE — 1090000001 HH PPS REVENUE CREDIT

## 2022-08-04 PROCEDURE — 1090000002 HH PPS REVENUE DEBIT

## 2022-08-05 PROCEDURE — 1090000002 HH PPS REVENUE DEBIT

## 2022-08-05 PROCEDURE — 1090000001 HH PPS REVENUE CREDIT

## 2022-08-06 PROCEDURE — 1090000002 HH PPS REVENUE DEBIT

## 2022-08-06 PROCEDURE — 1090000001 HH PPS REVENUE CREDIT

## 2022-08-07 PROCEDURE — 1090000002 HH PPS REVENUE DEBIT

## 2022-08-07 PROCEDURE — 1090000001 HH PPS REVENUE CREDIT

## 2022-08-08 ENCOUNTER — HOME CARE VISIT (OUTPATIENT)
Dept: SCHEDULING | Facility: HOME HEALTH | Age: 87
End: 2022-08-08
Payer: MEDICARE

## 2022-08-08 VITALS
TEMPERATURE: 98.5 F | OXYGEN SATURATION: 98 % | SYSTOLIC BLOOD PRESSURE: 136 MMHG | HEART RATE: 66 BPM | RESPIRATION RATE: 18 BRPM | DIASTOLIC BLOOD PRESSURE: 60 MMHG

## 2022-08-08 PROCEDURE — 1090000002 HH PPS REVENUE DEBIT

## 2022-08-08 PROCEDURE — 400014 HH F/U

## 2022-08-08 PROCEDURE — 1090000001 HH PPS REVENUE CREDIT

## 2022-08-08 PROCEDURE — G0299 HHS/HOSPICE OF RN EA 15 MIN: HCPCS

## 2022-08-08 ASSESSMENT — ENCOUNTER SYMPTOMS
PAIN LOCATION - PAIN QUALITY: ACHING
STOOL DESCRIPTION: FORMED

## 2022-08-09 PROCEDURE — 1090000002 HH PPS REVENUE DEBIT

## 2022-08-09 PROCEDURE — 1090000001 HH PPS REVENUE CREDIT

## 2022-08-10 PROCEDURE — 1090000001 HH PPS REVENUE CREDIT

## 2022-08-10 PROCEDURE — 1090000002 HH PPS REVENUE DEBIT

## 2022-08-11 ENCOUNTER — HOME CARE VISIT (OUTPATIENT)
Dept: SCHEDULING | Facility: HOME HEALTH | Age: 87
End: 2022-08-11
Payer: MEDICARE

## 2022-08-11 VITALS
TEMPERATURE: 98.3 F | HEART RATE: 70 BPM | RESPIRATION RATE: 17 BRPM | DIASTOLIC BLOOD PRESSURE: 87 MMHG | SYSTOLIC BLOOD PRESSURE: 130 MMHG | OXYGEN SATURATION: 100 %

## 2022-08-11 PROCEDURE — 1090000001 HH PPS REVENUE CREDIT

## 2022-08-11 PROCEDURE — G0299 HHS/HOSPICE OF RN EA 15 MIN: HCPCS

## 2022-08-11 PROCEDURE — 1090000002 HH PPS REVENUE DEBIT

## 2022-08-11 ASSESSMENT — ENCOUNTER SYMPTOMS: STOOL DESCRIPTION: FORMED

## 2022-08-12 PROCEDURE — 1090000001 HH PPS REVENUE CREDIT

## 2022-08-12 PROCEDURE — 1090000002 HH PPS REVENUE DEBIT

## 2022-08-12 RX ORDER — FUROSEMIDE 20 MG/1
TABLET ORAL
Qty: 90 TABLET | Refills: 3 | Status: SHIPPED | OUTPATIENT
Start: 2022-08-12

## 2022-08-13 PROCEDURE — 1090000001 HH PPS REVENUE CREDIT

## 2022-08-13 PROCEDURE — 1090000002 HH PPS REVENUE DEBIT

## 2022-08-14 PROCEDURE — 1090000001 HH PPS REVENUE CREDIT

## 2022-08-14 PROCEDURE — 1090000002 HH PPS REVENUE DEBIT

## 2022-08-15 ENCOUNTER — HOME CARE VISIT (OUTPATIENT)
Dept: SCHEDULING | Facility: HOME HEALTH | Age: 87
End: 2022-08-15
Payer: MEDICARE

## 2022-08-15 VITALS
HEART RATE: 73 BPM | OXYGEN SATURATION: 99 % | DIASTOLIC BLOOD PRESSURE: 67 MMHG | TEMPERATURE: 98.6 F | RESPIRATION RATE: 17 BRPM | SYSTOLIC BLOOD PRESSURE: 153 MMHG

## 2022-08-15 PROCEDURE — 1090000002 HH PPS REVENUE DEBIT

## 2022-08-15 PROCEDURE — 1090000001 HH PPS REVENUE CREDIT

## 2022-08-15 PROCEDURE — G0299 HHS/HOSPICE OF RN EA 15 MIN: HCPCS

## 2022-08-15 ASSESSMENT — ENCOUNTER SYMPTOMS: STOOL DESCRIPTION: FORMED

## 2022-08-16 PROCEDURE — 1090000002 HH PPS REVENUE DEBIT

## 2022-08-16 PROCEDURE — 1090000001 HH PPS REVENUE CREDIT

## 2022-08-17 PROCEDURE — 1090000001 HH PPS REVENUE CREDIT

## 2022-08-17 PROCEDURE — 1090000002 HH PPS REVENUE DEBIT

## 2022-08-18 ENCOUNTER — HOME CARE VISIT (OUTPATIENT)
Dept: SCHEDULING | Facility: HOME HEALTH | Age: 87
End: 2022-08-18
Payer: MEDICARE

## 2022-08-18 ENCOUNTER — HOSPITAL ENCOUNTER (OUTPATIENT)
Dept: WOUND CARE | Age: 87
Discharge: HOME | End: 2022-08-18
Payer: MEDICARE

## 2022-08-18 VITALS
BODY MASS INDEX: 27.64 KG/M2 | WEIGHT: 156 LBS | SYSTOLIC BLOOD PRESSURE: 177 MMHG | TEMPERATURE: 98.4 F | DIASTOLIC BLOOD PRESSURE: 81 MMHG | HEART RATE: 68 BPM | HEIGHT: 63 IN

## 2022-08-18 PROCEDURE — 99214 OFFICE O/P EST MOD 30 MIN: CPT | Performed by: SURGERY

## 2022-08-18 PROCEDURE — 99212 OFFICE O/P EST SF 10 MIN: CPT

## 2022-08-18 PROCEDURE — 1090000001 HH PPS REVENUE CREDIT

## 2022-08-18 PROCEDURE — 1090000002 HH PPS REVENUE DEBIT

## 2022-08-18 NOTE — PROGRESS NOTES
[address]  Consult Note/H&P/Progress Note      [address] RECORD NUMBER:  [id number]  AGE: 81 y.o. RACE White (non-)  GENDER: female  : 1935  EPISODE DATE:  2022       Subjective:      CC (Chief Complaint) and HISTORY of PRESENT ILLNESS (HPI):    Jenny Trejo is a 81 y.o. female who presents today for wound/ulcer evaluation. Her first visit to the wound center with us was on 22   She reported progressive ulcerations and blistering of her right lower extremity since approximately 2021. Nothing in particular made her condition better or worse. She associated the condition with use of a venous thrombosis pump which was placed on her leg in the summer 2021.    No associated fevers      She has been diabetic for approximately 20 years         This information was obtained from the patient  The following HPI elements were documented for the patient's wound:  Location:  Right lower extremity ulcers  Duration: Since approximately 2021  Severity : Moderate severity   Context: History of diabetes mellitus for 20 years   Modifying Factors:  Nothing makes better or worse   Quality: No reported history of DVT   Timing:     Associated Signs and Symptoms : No fevers         Ulcer Identification:   Ulcer Type: venous      Contributing Factors: venous stasis, lymphedema and diabetes                PAST MEDICAL HISTORY  Past Medical History:   Diagnosis Date    Arthritis     Cancer ([address]     Diabetes ([address]     History of urostomy     Hypertension         PAST SURGICAL HISTORY  Past Surgical History:   Procedure Laterality Date    ANTERIOR CRUCIATE LIGAMENT REPAIR      CATARACT REMOVAL Left     CHOLECYSTECTOMY      GYN      HX CYSTECTOMY      Bladder removal wears a bag    HYSTERECTOMY, VAGINAL      ORTHOPEDIC SURGERY      TOE AMPUTATION  2021    pt had a hangmail which infected       FAMILY HISTORY  Family History   Problem Relation Age of Onset    Diabetes Sister     Heart Disease Sister     Heart Disease Father        SOCIAL HISTORY  Social History     Tobacco Use    Smoking status: Never    Smokeless tobacco: Never   Vaping Use    Vaping Use: Never used   Substance Use Topics    Alcohol use: No    Drug use: No       ALLERGIES  Allergies   Allergen Reactions    Penicillins Rash       MEDICATIONS  Current Outpatient Medications on File Prior to Encounter   Medication Sig Dispense Refill    furosemide (LASIX) 20 MG tablet TAKE 1 TABLET BY MOUTH DAILY 90 tablet 3    Saccharomyces boulardii (DIGESTIVE PROBIOTIC PO) Take 1 tablet by mouth daily      Multiple Vitamin (MULTIVITAMIN PO) Take 1 tablet by mouth daily      atenolol (TENORMIN) 100 MG tablet Take 1 tablet by mouth daily 90 tablet 3    losartan (COZAAR) 50 mg tablet Take 1 tablet by mouth daily 90 tablet 3    metFORMIN (GLUCOPHAGE-XR) 500 mg extended release tablet Take 1 tablet by mouth 2 times daily 180 tablet 3    acetaminophen (TYLENOL) 500 MG tablet Take 500 mg by mouth every 6 hours as needed for Pain      Calcium Carb-Cholecalciferol 600-800 MG-UNIT CHEW Take 1 tablet by mouth daily      triamcinolone (KENALOG) 0.1 % cream Apply 1 each topically 2 times daily Apply to periskin of urostomy       No current facility-administered medications on file prior to encounter. REVIEW OF SYSTEMS  The patient has no difficulty with chest pain or shortness of breath. No fever or chills. Comprehensive review of systems was otherwise unremarkable except as noted above.       Objective:   Physical Exam:   Recent vitals (if inpt):  Patient Vitals for the past 24 hrs:   Height Weight   08/18/22 1010 5' 3\" (1.6 m) 156 lb (70.8 kg)         Ht 5' 3\" (1.6 m)   Wt 156 lb (70.8 kg)   BMI 27.63 kg/m²   Wt Readings from Last 3 Encounters:   08/18/22 156 lb (70.8 kg)   07/07/22 157 lb (71.2 kg)   06/23/22 160 lb (72.6 kg)     Constitutional: Alert, oriented, cooperative patient in no acute distress; appears stated age;  General appearance is within normal limits for wound care patient population. See the today's recorded vitals signs and constitutional data. Eyes: Pupils equal; Sclera are clear. EOMs intact; The eyes appear to track and move normally. The sclera are not injected. The conjunctive are clear. The eyelids are normal. There is no scleral icterus. ENMT: There are no obvious external ear, nose, lip or mouth lesions. Nares normal; Neck: Overall contour of the neck is normal with no obvious neck masses. Gross hearing is within normal limits. No obvious neck masses  Resp:  Breathing is non-labored; normal rate and effort; no audible wheezing. CV: RRR;  no JVD; No evidence of cyanosis of the upper extremities. The extremities are perfused without embolic sign, splinter hemorrhages, or petechia. GI: soft and non-distended without acute abnormality noted. Musculoskeletal: unremarkable with normal function. No embolic signs or cyanosis. Neuro:  Oriented; moves all 4; no focal deficits  Psychiatric: Judgement and insight are within normal limits for the wound care population of patients. Patient is oriented to person, place, and time. Recent and remote memory are within normal limits. Mood and affect are within normal limits. Integumentary (Skin/Wounds)  See inspection of wound(s) below. There are no other skin areas of palpable concern. See wound center documentation including photos in the [address] Wound Template made part of this record by reference. Wound Care Documentation:    Wound 04/08/22 Pretibial Right;Medial;Proximal #1 (Active)   Wound Image   08/18/22 1031   Wound Etiology Venous 08/18/22 1031   Dressing Status Clean;Dry; Intact 08/18/22 1031   Wound Cleansed Soap and water;Cleansed with saline 08/18/22 1031   Dressing/Treatment ABD 08/18/22 1031   Wound Length (cm) 0 cm 08/18/22 1031   Wound Width (cm) 0 cm 08/18/22 1031   Wound Depth (cm) 0 cm 08/18/22 1031   Wound Surface Area (cm^2) 0 cm^2 08/18/22 1031   Change in Wound Size % (l*w) 100 08/18/22 1031   Wound Volume (cm^3) 0 cm^3 08/18/22 1031   Wound Healing % 100 08/18/22 1031   Wound Assessment Epithelialization 08/18/22 1031   Drainage Amount None 08/18/22 1031   Drainage Description Serosanguinous 08/01/22 1541   Odor None 08/18/22 1031   Megan-wound Assessment Dry/flaky 08/18/22 1031   Margins Defined edges 08/01/22 1541   Wound Thickness Description not for Pressure Injury Full thickness 08/01/22 1541   Number of days: 131       Wound 06/16/22 Foot Right;Lateral (Active)   Wound Image   08/18/22 1031   Wound Etiology Pressure Stage 2 08/18/22 1031   Dressing Status Clean;Dry; Intact 08/18/22 1031   Wound Cleansed Soap and water;Cleansed with saline 08/18/22 1031   Dressing/Treatment ABD 08/18/22 1031   Wound Length (cm) 0 cm 08/18/22 1031   Wound Width (cm) 0 cm 08/18/22 1031   Wound Depth (cm) 0 cm 08/18/22 1031   Wound Surface Area (cm^2) 0 cm^2 08/18/22 1031   Change in Wound Size % (l*w) 100 08/18/22 1031   Wound Volume (cm^3) 0 cm^3 08/18/22 1031   Wound Healing % 100 08/18/22 1031   Wound Assessment Dry 08/18/22 1031   Drainage Amount None 08/18/22 1031   Drainage Description Sanguinous 06/16/22 1123   Odor None 08/18/22 1031   Megan-wound Assessment Dry/flaky;Fragile;Blanchable erythema 08/18/22 1031   Wound Thickness Description not for Pressure Injury Partial thickness 06/23/22 1147   Number of days: 62       Wound 07/18/22 Leg Lower;Right (Active)   Wound Image   08/18/22 1031   Wound Etiology Venous 08/18/22 1031   Dressing Status Clean;Dry; Intact 08/18/22 1031   Wound Cleansed Cleansed with saline; Soap and water 08/18/22 1031   Dressing/Treatment ABD 08/18/22 1031   Wound Length (cm) 0 cm 08/18/22 1031   Wound Width (cm) 0 cm 08/18/22 1031   Wound Depth (cm) 0 cm 08/18/22 1031   Wound Surface Area (cm^2) 0 cm^2 08/18/22 1031   Change in Wound 04/06/2022 04:31 PM    GLUCOSE 119 04/06/2022 04:31 PM    CALCIUM 9.7 04/06/2022 04:31 PM      Most recent Liver enzyme test review  Lab Results   Component Value Date    ALT 13 04/06/2022    AST 26 04/06/2022    ALKPHOS 104 04/06/2022    BILITOT 0.3 04/06/2022     Most recent coagulation (coags) review  @lastinr@  No results found for: INR, APTT, AML, AML, LCAD    Diabetic assessment  Lab Results   Component Value Date    LABA1C 6.9 (H) 04/06/2022     Lab Results   Component Value Date     04/06/2022       Nutritional assessment screen to assess wound healing ability:  Lab Results   Component Value Date    LABALBU 4.0 04/06/2022     No results found for: TP, ALBR, ALB    @lastXray@  @lastCT@      Admission date (for inpatients): 8/18/2022   Day of Surgery  * No procedures listed *    Assessment:      Problem List Items Addressed This Visit    None      [unfilled]    Active Problems:    Dermatitis    Open wound of right foot    BMI 27.0-27.9,adult    Type 2 diabetes mellitus with skin complication, without long-term current use of insulin (HCC)    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        History of bladder cancer 07/28/2020    Retinal edema 02/28/2020    Intermediate stage nonexudative age-related macular degeneration of left eye 02/28/2020    Arthritis 12/13/2018     Lower back        Essential hypertension 12/13/2018    Type 2 diabetes mellitus with skin complication, without long-term current use of insulin ([address] 12/13/2018           Plan:     Number and Complexity of Problems addressed and   Risks of complications and/or morbidity of management    Treatment Note please see attached Discharge Instructions  Any procedures done today in the wound center (if documented in a separate note) in [facility] are made part of this record by reference  Written patient dismissal instructions given to patient or POA. Right lower extremity venous stasis ulcers  Weekly multilayer compression dressings. We encouraged elevation  We encouraged her to purchase a cast cover from [employer] to keep the dressing dry. Discontinued hydrofera to the right lower extremity venous ulcer on 7/21/2022 and converted to zinc patches under compression   Apply zinc oxide to pretibial shin where there is some contact dermatitis from bandaging    As of 8/18/2022 her venous ulcer completely healed. She brought in knee-high compression stockings and was instructed to wear these daily and replace them as needed. She will return to see us if a recurrent ulcer develops. Right lateral forefoot pressure ulcer from bandage new on 6/16/2022  Most recent dressing change done by home health  Apply Hydrofera Blue and wrap in that area. Cushion and pad that area to distribute pressure pressure better  Asked patient to offload more to prevent pressurization of lateral forefoot with ambulation    This healed by 8/18/2022  She will see podiatry next for recommendations on shoe wear and to get her nails trimmed.      Venous dermatitis   Weekly Desitin      Lymphedema   Elevation   Salt avoidance   Compression   Weight loss         DM2 with HgbA1c 6.9 on 4/6/22   Encourage adherence to diabetic diet and diabetic medical regimen to help facilitate wound healing   Encourage close follow-up with primary care physician   Encourage HgbA1c q3 months         BMI>30-->29   Encourage weight loss       Treatment significantly limited by social determinants of health  The patient so far has not been able to be compliant with significant reduction in her BMI   which is exacerbating lymphedema and impairing our ability   to treat her dermatitis, venous hypertension, and chronic venous leg ulcers                          Wound Care  No orders of the defined types were placed in this encounter. [unfilled]            Level of MDM (2/3 elements below)  Number and Complexity of Problems Addressed Amount and/or Complexity of Data to be Reviewed and Analyzed  *Each unique test, order, or document contributes to the combination of 2 or combination of 3 in Category 1 below. Risk of Complications and/or Morbidity or Mortality of pt Management     18960  55807 SF Minimal  ?1self-limited or minor problem Minimal or none Minimal risk of morbidity from additional diagnostic testing or Rx   [id number]  23951 Low Low  ? 2or more self-limited or minor problems;    or  ? 1stable chronic illness;    or  ?1acute, uncomplicated illness or injury   Limited  (Must meet the requirements of at least 1 of the 2 categories)  Category 1: Tests and documents   ? Any combination of 2 from the following:  ?Review of prior external note(s) from each unique source*;  ?review of the result(s) of each unique test*;   ?ordering of each unique test*    or   Category 2: Assessment requiring an independent historian(s)  (For the categories of independent interpretation of tests and discussion of management or test interpretation, see moderate or high) Low risk of morbidity from additional diagnostic testing or treatment     70132  74269 Mod Moderate  ? 1or more chronic illnesses with exacerbation, progression, or side effects of treatment;    or  ?2or more stable chronic illnesses;    or  ?1undiagnosed new problem with uncertain prognosis;    or  ?1acute illness with systemic symptoms;    or  ?1acute complicated injury   Moderate  (Must meet the requirements of at least 1 out of 3 categories)  Category 1: Tests, documents, or independent historian(s)  ? Any combination of 3 from the following:   ?Review of prior external note(s) from each unique source*;  ?Review of the result(s) of each unique test*;  ?Ordering of each unique test*;  ?Assessment requiring an independent historian(s)    or  Category 2: Independent interpretation of tests   ? Independent interpretation of a test performed by another physician/other qualified health care professional (not separately reported);     or  Category 3: Discussion of management or test interpretation  ? Discussion of management or test interpretation with external physician/other qualified health care professional/appropriate source (not separately reported)   Moderate risk of morbidity from additional diagnostic testing or treatment  Examples only:  ?Prescription drug management -Hydrofera   ? Decision regarding minor surgery with identified patient or procedure risk factors  ? Decision regarding elective major surgery without identified patient or procedure risk factors   ? Diagnosis or treatment significantly limited by social determinants of health       [id number] High High  ? 1or more chronic illnesses with severe exacerbation, progression, or side effects of treatment;    or  ?1 acute or chronic illness or injury that poses a threat to life or bodily function   Extensive  (Must meet the requirements of at least 2 out of 3 categories)  Category 1: Tests, documents, or independent historian(s)  ? Any combination of 3 from the following:   ?Review of prior external note(s) from each unique source*;  ?Review of the result(s) of each unique test*;   ?Ordering of each unique test*;   ?Assessment requiring an independent historian(s)    or Category 2: Independent interpretation of tests   ? Independent interpretation of a test performed by another physician/other qualified health care professional (not separately reported);     or  Category 3: Discussion of management or test interpretation  ? Discussion of management or test interpretation with external physician/other qualified health care professional/appropriate source (not separately reported)   High risk of morbidity from additional diagnostic testing or treatment  Examples only:  ?Drug therapy requiring intensive monitoring for toxicity  ? Decision regarding elective major surgery with identified patient or procedure risk factors  ? Decision regarding emergency major surgery  ? Decision regarding hospitalization  ? Decision not to resuscitate or to de-escalate care because of poor prognosis                 I have personally performed a face-to-face diagnostic evaluation and management  service on this patient. I have independently seen the patient. I have independently obtained the above history from the patient/family. I have independently examined the patient with above findings. I have independently reviewed data/labs for this patient and developed the above plan of care (MDM).       Electronically signed by Delbert Kendrick MD on 8/18/2022 at 10:47 AM

## 2022-08-18 NOTE — FLOWSHEET NOTE
08/18/22 1031   Right Leg Edema Point of Measurement   Leg circumference 37 cm   Ankle circumference 23 cm   Foot circumference 20.5 cm   Compression Therapy 2 layer compression wrap   Wound 07/18/22 Leg Lower;Right   Date First Assessed: 07/18/22   Location: Leg  Wound Location Orientation: Lower;Right   Wound Image    Wound Etiology Venous   Dressing Status Clean;Dry; Intact   Wound Cleansed Cleansed with saline; Soap and water   Dressing/Treatment ABD  (TLC, Zinc)   Wound Length (cm) 0 cm   Wound Width (cm) 0 cm   Wound Depth (cm) 0 cm   Wound Surface Area (cm^2) 0 cm^2   Change in Wound Size % (l*w) 100   Wound Volume (cm^3) 0 cm^3   Wound Healing % 100   Wound Assessment Epithelialization   Drainage Amount None   Wound 06/16/22 Foot Right;Lateral   Date First Assessed/Time First Assessed: 06/16/22 1148   Present on Hospital Admission: Yes  Primary Wound Type: Pressure Injury  Location: Foot  Wound Location Orientation: Right;Lateral   Wound Image    Wound Etiology Pressure Stage 2   Dressing Status Clean;Dry; Intact   Wound Cleansed Soap and water;Cleansed with saline   Dressing/Treatment ABD  (TLC)   Wound Length (cm) 0 cm   Wound Width (cm) 0 cm   Wound Depth (cm) 0 cm   Wound Surface Area (cm^2) 0 cm^2   Change in Wound Size % (l*w) 100   Wound Volume (cm^3) 0 cm^3   Wound Healing % 100   Wound Assessment Dry   Drainage Amount None   Odor None   Megan-wound Assessment Dry/flaky;Fragile;Blanchable erythema   Wound 04/08/22 Pretibial Right;Medial;Proximal #1   Date First Assessed/Time First Assessed: 04/08/22 1248   Present on Hospital Admission: Yes  Primary Wound Type: Venous Ulcer  Location: Pretibial  Wound Location Orientation: Right;Medial;Proximal  Wound Description (Comments): #1   Wound Image    Wound Etiology Venous   Dressing Status Clean;Dry; Intact   Wound Cleansed Soap and water;Cleansed with saline   Dressing/Treatment ABD  (TLC;)   Wound Length (cm) 0 cm   Wound Width (cm) 0 cm   Wound Depth (cm) 0 cm   Wound Surface Area (cm^2) 0 cm^2   Change in Wound Size % (l*w) 100   Wound Volume (cm^3) 0 cm^3   Wound Healing % 100   Wound Assessment Epithelialization   Drainage Amount None   Odor None   Megan-wound Assessment Dry/flaky

## 2022-08-19 PROCEDURE — 1090000001 HH PPS REVENUE CREDIT

## 2022-08-19 PROCEDURE — 1090000002 HH PPS REVENUE DEBIT

## 2022-08-20 PROCEDURE — 1090000002 HH PPS REVENUE DEBIT

## 2022-08-20 PROCEDURE — 1090000001 HH PPS REVENUE CREDIT

## 2022-08-21 PROCEDURE — 1090000001 HH PPS REVENUE CREDIT

## 2022-08-21 PROCEDURE — 1090000002 HH PPS REVENUE DEBIT

## 2022-08-22 ENCOUNTER — HOME CARE VISIT (OUTPATIENT)
Dept: SCHEDULING | Facility: HOME HEALTH | Age: 87
End: 2022-08-22
Payer: MEDICARE

## 2022-08-22 VITALS
RESPIRATION RATE: 18 BRPM | DIASTOLIC BLOOD PRESSURE: 84 MMHG | HEART RATE: 60 BPM | OXYGEN SATURATION: 98 % | TEMPERATURE: 98.3 F | SYSTOLIC BLOOD PRESSURE: 132 MMHG

## 2022-08-22 PROCEDURE — G0299 HHS/HOSPICE OF RN EA 15 MIN: HCPCS

## 2022-08-22 PROCEDURE — 1090000002 HH PPS REVENUE DEBIT

## 2022-08-22 PROCEDURE — 1090000001 HH PPS REVENUE CREDIT

## 2022-08-22 ASSESSMENT — ENCOUNTER SYMPTOMS: STOOL DESCRIPTION: FORMED

## 2022-08-23 ENCOUNTER — TELEPHONE (OUTPATIENT)
Dept: WOUND CARE | Age: 87
End: 2022-08-23

## 2022-08-23 PROCEDURE — 1090000001 HH PPS REVENUE CREDIT

## 2022-08-23 PROCEDURE — 1090000002 HH PPS REVENUE DEBIT

## 2022-08-23 NOTE — TELEPHONE ENCOUNTER
Liam Haro RN called and stated when she removed the patient's compression stocking she had a small partial thickness wound on leg. Her leg is very dry per the [facility] RN. RN asked for orders for Xeroform or Zinc patch under a border foam with her compression stocking. This RN stated that would be ok until she saw Dr. Mesfin Hudson again. [facility] RN voiced understanding.

## 2022-08-24 PROCEDURE — 1090000002 HH PPS REVENUE DEBIT

## 2022-08-24 PROCEDURE — 1090000001 HH PPS REVENUE CREDIT

## 2022-08-25 ENCOUNTER — HOME CARE VISIT (OUTPATIENT)
Dept: SCHEDULING | Facility: HOME HEALTH | Age: 87
End: 2022-08-25
Payer: MEDICARE

## 2022-08-25 VITALS
RESPIRATION RATE: 18 BRPM | OXYGEN SATURATION: 97 % | SYSTOLIC BLOOD PRESSURE: 110 MMHG | HEART RATE: 74 BPM | DIASTOLIC BLOOD PRESSURE: 56 MMHG | TEMPERATURE: 98.2 F

## 2022-08-25 PROCEDURE — G0299 HHS/HOSPICE OF RN EA 15 MIN: HCPCS

## 2022-08-25 PROCEDURE — 1090000001 HH PPS REVENUE CREDIT

## 2022-08-25 PROCEDURE — 1090000002 HH PPS REVENUE DEBIT

## 2022-08-25 ASSESSMENT — ENCOUNTER SYMPTOMS
STOOL DESCRIPTION: FORMED
DYSPNEA ACTIVITY LEVEL: AFTER AMBULATING MORE THAN 20 FT

## 2022-08-26 PROCEDURE — 1090000002 HH PPS REVENUE DEBIT

## 2022-08-26 PROCEDURE — 1090000001 HH PPS REVENUE CREDIT

## 2022-08-27 PROCEDURE — 1090000002 HH PPS REVENUE DEBIT

## 2022-08-27 PROCEDURE — 1090000001 HH PPS REVENUE CREDIT

## 2022-08-28 PROCEDURE — 1090000001 HH PPS REVENUE CREDIT

## 2022-08-28 PROCEDURE — 1090000002 HH PPS REVENUE DEBIT

## 2022-08-29 ENCOUNTER — HOME CARE VISIT (OUTPATIENT)
Dept: SCHEDULING | Facility: HOME HEALTH | Age: 87
End: 2022-08-29
Payer: MEDICARE

## 2022-08-29 VITALS
RESPIRATION RATE: 18 BRPM | OXYGEN SATURATION: 97 % | DIASTOLIC BLOOD PRESSURE: 64 MMHG | TEMPERATURE: 98.4 F | HEART RATE: 75 BPM | SYSTOLIC BLOOD PRESSURE: 139 MMHG

## 2022-08-29 PROCEDURE — G0299 HHS/HOSPICE OF RN EA 15 MIN: HCPCS

## 2022-08-29 PROCEDURE — 1090000001 HH PPS REVENUE CREDIT

## 2022-08-29 PROCEDURE — 1090000002 HH PPS REVENUE DEBIT

## 2022-08-29 ASSESSMENT — ENCOUNTER SYMPTOMS: STOOL DESCRIPTION: SOFT FORMED

## 2022-08-30 ENCOUNTER — HOME CARE VISIT (OUTPATIENT)
Dept: SCHEDULING | Facility: HOME HEALTH | Age: 87
End: 2022-08-30
Payer: MEDICARE

## 2022-08-30 VITALS
TEMPERATURE: 98.5 F | HEART RATE: 70 BPM | DIASTOLIC BLOOD PRESSURE: 84 MMHG | SYSTOLIC BLOOD PRESSURE: 136 MMHG | RESPIRATION RATE: 18 BRPM

## 2022-08-30 PROCEDURE — 1090000001 HH PPS REVENUE CREDIT

## 2022-08-30 PROCEDURE — G0151 HHCP-SERV OF PT,EA 15 MIN: HCPCS

## 2022-08-30 PROCEDURE — 1090000002 HH PPS REVENUE DEBIT

## 2022-08-30 ASSESSMENT — ENCOUNTER SYMPTOMS: PAIN LOCATION - PAIN QUALITY: ACHING, BURNING

## 2022-08-31 PROCEDURE — 1090000001 HH PPS REVENUE CREDIT

## 2022-08-31 PROCEDURE — 1090000002 HH PPS REVENUE DEBIT

## 2022-09-01 ENCOUNTER — HOME CARE VISIT (OUTPATIENT)
Dept: SCHEDULING | Facility: HOME HEALTH | Age: 87
End: 2022-09-01
Payer: MEDICARE

## 2022-09-01 VITALS
OXYGEN SATURATION: 98 % | SYSTOLIC BLOOD PRESSURE: 137 MMHG | HEART RATE: 75 BPM | DIASTOLIC BLOOD PRESSURE: 62 MMHG | TEMPERATURE: 98.6 F | RESPIRATION RATE: 18 BRPM

## 2022-09-01 PROCEDURE — 1090000002 HH PPS REVENUE DEBIT

## 2022-09-01 PROCEDURE — G0299 HHS/HOSPICE OF RN EA 15 MIN: HCPCS

## 2022-09-01 PROCEDURE — 1090000001 HH PPS REVENUE CREDIT

## 2022-09-01 ASSESSMENT — ENCOUNTER SYMPTOMS: STOOL DESCRIPTION: FORMED

## 2022-09-02 ENCOUNTER — HOME CARE VISIT (OUTPATIENT)
Dept: SCHEDULING | Facility: HOME HEALTH | Age: 87
End: 2022-09-02
Payer: MEDICARE

## 2022-09-02 VITALS
HEART RATE: 72 BPM | TEMPERATURE: 97.5 F | OXYGEN SATURATION: 98 % | RESPIRATION RATE: 16 BRPM | SYSTOLIC BLOOD PRESSURE: 130 MMHG | DIASTOLIC BLOOD PRESSURE: 78 MMHG

## 2022-09-02 PROCEDURE — 1090000002 HH PPS REVENUE DEBIT

## 2022-09-02 PROCEDURE — G0157 HHC PT ASSISTANT EA 15: HCPCS

## 2022-09-02 PROCEDURE — 1090000001 HH PPS REVENUE CREDIT

## 2022-09-03 PROCEDURE — 1090000002 HH PPS REVENUE DEBIT

## 2022-09-03 PROCEDURE — 1090000001 HH PPS REVENUE CREDIT

## 2022-09-04 PROCEDURE — 1090000001 HH PPS REVENUE CREDIT

## 2022-09-04 PROCEDURE — 1090000002 HH PPS REVENUE DEBIT

## 2022-09-05 ENCOUNTER — HOME CARE VISIT (OUTPATIENT)
Dept: SCHEDULING | Facility: HOME HEALTH | Age: 87
End: 2022-09-05
Payer: MEDICARE

## 2022-09-05 VITALS
TEMPERATURE: 98 F | DIASTOLIC BLOOD PRESSURE: 66 MMHG | RESPIRATION RATE: 18 BRPM | OXYGEN SATURATION: 97 % | HEART RATE: 78 BPM | SYSTOLIC BLOOD PRESSURE: 126 MMHG

## 2022-09-05 VITALS
TEMPERATURE: 98.3 F | DIASTOLIC BLOOD PRESSURE: 78 MMHG | RESPIRATION RATE: 17 BRPM | SYSTOLIC BLOOD PRESSURE: 126 MMHG | OXYGEN SATURATION: 98 % | HEART RATE: 64 BPM

## 2022-09-05 PROCEDURE — 1090000001 HH PPS REVENUE CREDIT

## 2022-09-05 PROCEDURE — 1090000002 HH PPS REVENUE DEBIT

## 2022-09-05 PROCEDURE — G0157 HHC PT ASSISTANT EA 15: HCPCS

## 2022-09-05 PROCEDURE — 400014 HH F/U

## 2022-09-05 PROCEDURE — G0299 HHS/HOSPICE OF RN EA 15 MIN: HCPCS

## 2022-09-05 ASSESSMENT — ENCOUNTER SYMPTOMS: PAIN LOCATION - PAIN QUALITY: ACHE

## 2022-09-06 PROCEDURE — 1090000001 HH PPS REVENUE CREDIT

## 2022-09-06 PROCEDURE — 1090000002 HH PPS REVENUE DEBIT

## 2022-09-07 PROCEDURE — 1090000002 HH PPS REVENUE DEBIT

## 2022-09-07 PROCEDURE — 1090000001 HH PPS REVENUE CREDIT

## 2022-09-08 ENCOUNTER — HOSPITAL ENCOUNTER (OUTPATIENT)
Dept: WOUND CARE | Age: 87
Discharge: HOME | End: 2022-09-08
Payer: MEDICARE

## 2022-09-08 ENCOUNTER — HOME CARE VISIT (OUTPATIENT)
Dept: SCHEDULING | Facility: HOME HEALTH | Age: 87
End: 2022-09-08
Payer: MEDICARE

## 2022-09-08 VITALS
SYSTOLIC BLOOD PRESSURE: 168 MMHG | HEART RATE: 80 BPM | BODY MASS INDEX: 27.64 KG/M2 | DIASTOLIC BLOOD PRESSURE: 62 MMHG | HEIGHT: 63 IN | OXYGEN SATURATION: 99 % | WEIGHT: 156 LBS | RESPIRATION RATE: 18 BRPM | TEMPERATURE: 97.8 F

## 2022-09-08 DIAGNOSIS — L97.919 CHRONIC VENOUS HTN W ULCER AND INFLAMMATION OF R LOW EXTREM (HCC): ICD-10-CM

## 2022-09-08 DIAGNOSIS — I87.331 CHRONIC VENOUS HTN W ULCER AND INFLAMMATION OF R LOW EXTREM (HCC): ICD-10-CM

## 2022-09-08 DIAGNOSIS — L97.912 NON-PRESSURE CHRONIC ULCER OF RIGHT LOWER LEG WITH FAT LAYER EXPOSED (HCC): Primary | ICD-10-CM

## 2022-09-08 PROCEDURE — 11042 DBRDMT SUBQ TIS 1ST 20SQCM/<: CPT | Performed by: FAMILY MEDICINE

## 2022-09-08 PROCEDURE — 1090000001 HH PPS REVENUE CREDIT

## 2022-09-08 PROCEDURE — 99213 OFFICE O/P EST LOW 20 MIN: CPT | Performed by: FAMILY MEDICINE

## 2022-09-08 PROCEDURE — 1090000002 HH PPS REVENUE DEBIT

## 2022-09-08 PROCEDURE — 11042 DBRDMT SUBQ TIS 1ST 20SQCM/<: CPT

## 2022-09-08 RX ORDER — GINSENG 100 MG
CAPSULE ORAL ONCE
Status: CANCELLED | OUTPATIENT
Start: 2022-09-08 | End: 2022-09-08

## 2022-09-08 RX ORDER — LIDOCAINE HYDROCHLORIDE 40 MG/ML
SOLUTION TOPICAL ONCE
Status: CANCELLED | OUTPATIENT
Start: 2022-09-08 | End: 2022-09-08

## 2022-09-08 RX ORDER — BETAMETHASONE DIPROPIONATE 0.05 %
OINTMENT (GRAM) TOPICAL ONCE
Status: CANCELLED | OUTPATIENT
Start: 2022-09-08 | End: 2022-09-08

## 2022-09-08 RX ORDER — BACITRACIN ZINC AND POLYMYXIN B SULFATE 500; 1000 [USP'U]/G; [USP'U]/G
OINTMENT TOPICAL ONCE
Status: CANCELLED | OUTPATIENT
Start: 2022-09-08 | End: 2022-09-08

## 2022-09-08 RX ORDER — LIDOCAINE HYDROCHLORIDE 20 MG/ML
JELLY TOPICAL ONCE
Status: CANCELLED | OUTPATIENT
Start: 2022-09-08 | End: 2022-09-08

## 2022-09-08 RX ORDER — BACITRACIN, NEOMYCIN, POLYMYXIN B 400; 3.5; 5 [USP'U]/G; MG/G; [USP'U]/G
OINTMENT TOPICAL ONCE
Status: CANCELLED | OUTPATIENT
Start: 2022-09-08 | End: 2022-09-08

## 2022-09-08 RX ORDER — CLOBETASOL PROPIONATE 0.5 MG/G
OINTMENT TOPICAL ONCE
Status: CANCELLED | OUTPATIENT
Start: 2022-09-08 | End: 2022-09-08

## 2022-09-08 RX ORDER — LIDOCAINE 50 MG/G
OINTMENT TOPICAL ONCE
Status: CANCELLED | OUTPATIENT
Start: 2022-09-08 | End: 2022-09-08

## 2022-09-08 RX ORDER — LIDOCAINE HYDROCHLORIDE 20 MG/ML
JELLY TOPICAL ONCE
Status: COMPLETED | OUTPATIENT
Start: 2022-09-08 | End: 2022-09-08

## 2022-09-08 RX ORDER — GENTAMICIN SULFATE 1 MG/G
OINTMENT TOPICAL ONCE
Status: CANCELLED | OUTPATIENT
Start: 2022-09-08 | End: 2022-09-08

## 2022-09-08 RX ORDER — ATENOLOL 50 MG/1
TABLET ORAL
COMMUNITY
Start: 2022-09-07

## 2022-09-08 RX ORDER — LIDOCAINE 40 MG/G
CREAM TOPICAL ONCE
Status: CANCELLED | OUTPATIENT
Start: 2022-09-08 | End: 2022-09-08

## 2022-09-08 RX ADMIN — LIDOCAINE HYDROCHLORIDE: 20 JELLY TOPICAL at 16:44

## 2022-09-08 NOTE — PROGRESS NOTES
[address] and [address] [facility]   Medical Staff Progress Note     Keegan 9 RECORD NUMBER:  [id number]  AGE: 81 y.o. GENDER: female  : 1935  EPISODE DATE:  2022    Chief complaint and reason for visit:     Chief Complaint   Patient presents with    Wound Check     Right medial lower leg wound      Patient presenting for follow up evaluation of wound(s) per chief complaint. Patient here with daughter. Has some swelling in her lower legs and using over-the-counter compression therapy. Areas on her right lower extremity have blistered up and then became chronic wounds. Comes in today for evaluation of these. Patient's had problems like this in the past.    Subjective and ROS: Symptoms, wound related issues, or other pertinent wound history since last visit: no new wound care issues. Tolerating current treatment. No systemic complaints above baseline. History of Wound Context: Per original history and physical on this patient. Changes in history since last evaluation: none    Medical Decision Making:     Problem List Items Addressed This Visit          Other    Non-pressure chronic ulcer of right lower leg with fat layer exposed ([address]       Wounds and Treatment Plan: Will cover wounds today with Xeroform gauze and utilize Unna boot on the right lower extremity. Continue compression therapy on the left. Change this twice a week and we will see how this does next week. And family are in agreement. We also discussed excellent protein supplementation getting 60 g of protein a day. Other associated diagnoses or problems addressed:  Chronic venous hypertension. Pertinent imaging reviewed including independent interpretation include:  None    Pertinent labs reviewed. Medical records and review of external note (s) from other providers done as well.     New lab or imaging orders placed:  None     Prescription drug management: N/A Discussion of management or test interpretation with another provider. Comorbid conditions affecting wound healing: As per PMH which was reviewed. Risk of complications and/or mortality of patient management: This patient has a minimal risk of morbidity and mortality from additional diagnostic testing or treatment. This is due to the above conditions affecting wound healing as well as patient and procedure risk factors. Education and discussion held with patient regarding these disease processes pertinent to wound(s). Other pertinent decisions include: minor surgery or procedures as below. The patient's diagnosis or treatment is  significantly limited by social determinants of health as noted by: nutritional resource limitations . Wound 08/22/22 Leg Lower;Right (Active)   Wound Etiology Venous 09/05/22 1113   Dressing Status New dressing applied 09/05/22 1113   Wound Cleansed Cleansed with saline 09/05/22 1113   Dressing/Treatment Foam 09/05/22 1113   Wound Length (cm) 1 cm 09/05/22 1113   Wound Width (cm) 1 cm 09/05/22 1113   Wound Depth (cm) 0.1 cm 09/05/22 1113   Wound Surface Area (cm^2) 1 cm^2 09/05/22 1113   Change in Wound Size % (l*w) -185.71 09/05/22 1113   Wound Volume (cm^3) 0.1 cm^3 09/05/22 1113   Wound Healing % -186 09/05/22 1113   Wound Assessment Pink/red 09/05/22 1113   Drainage Amount Scant 09/05/22 1113   Drainage Description Serosanguinous 09/05/22 1113   Odor None 09/05/22 1113   Megan-wound Assessment Dry/flaky; Intact 09/05/22 1113   Margins Defined edges 09/05/22 1113   Wound Thickness Description not for Pressure Injury Partial thickness 09/05/22 1113   Number of days: 17       Wound 08/29/22 Leg Lower;Medial;Right (Active)   Wound Image    09/08/22 1041   Wound Etiology Venous 09/08/22 1041   Dressing Status Old drainage noted 09/08/22 1041   Wound Cleansed Soap and water 09/08/22 1041   Dressing/Treatment Foam 09/08/22 1041   Wound Length (cm) 3 cm 09/08/22 1041   Wound Width (cm) 1.7 cm 09/08/22 1041   Wound Depth (cm) 0.1 cm 09/08/22 1041   Wound Surface Area (cm^2) 5.1 cm^2 09/08/22 1041   Change in Wound Size % (l*w) -4150 09/08/22 1041   Wound Volume (cm^3) 0.51 cm^3 09/08/22 1041   Wound Healing % -4150 09/08/22 1041   Post-Procedure Length (cm) 3 cm 09/08/22 1041   Post-Procedure Width (cm) 1.7 cm 09/08/22 1041   Post-Procedure Depth (cm) 0.1 cm 09/08/22 1041   Post-Procedure Surface Area (cm^2) 5.1 cm^2 09/08/22 1041   Post-Procedure Volume (cm^3) 0.51 cm^3 09/08/22 1041   Wound Assessment [place]/red;Slough 09/08/22 1041   Drainage Amount Small 09/08/22 1041   Drainage Description Serosanguinous 09/08/22 1041   Odor None 09/08/22 1041   Megan-wound Assessment Dry/flaky 09/08/22 1041   Margins Defined edges 09/05/22 1113   Wound Thickness Description not for Pressure Injury Full thickness 09/08/22 1041   Number of days: 10          Procedures done during this encounter:   Debridement: Excisional Debridement  Indications:  Based on my examination of this patient's wound(s)/ulcer(s) today, debridement is required to promote healing and evaluate the wound base. Risks and benefits discussed with patient who has agreed to proceed. Performed by: Vitor Booth DO  Consent obtained:  Yes  Time out taken:  Yes  Pain Control:     Using curette the wound(s)/ulcer(s) was/were debrided down through and including the removal of dermis and subcutaneous tissue. Devitalized Tissue Debrided:  biofilm, slough, and necrotic/eschar  Pre Debridement Measurements:  Are located in the Ord  Documentation Flow Sheet  Wound/Ulcer #: 1  Post Debridement Measurements:  Wound/Ulcer Descriptions are Pre Debridement except measurements:   Total Surface Area Debrided:  5 sq cm   Diabetic/Pressure/Non Pressure Ulcers only:  Ulcer: Non-Pressure ulcer, fat layer exposed   Estimated Blood Loss:  Minimal  Hemostasis Achieved:  by pressure  Procedural Pain:  0  / 10   Post Procedural Pain:  0 / 10 Response to treatment:  Well tolerated by patient. TIME: E/M Time spent with patient and/or patient care issues: [] 15-20 min  [x] 21-30 min  [] 31-44 min  [] 45 min or more. This is above the usual time needed to address patient's chief complaint today: [x] Yes  [] No  This time includes physician non-face-to-face service time visit on the date of service such as  Preparing to see the patient (eg, review of tests)  Obtaining and/or reviewing separately obtained history  Performing a medically necessary appropriate examination and/or evaluation  Counseling and educating the patient/family/caregiver  Ordering medications, tests, or procedures  Referring and communicating with other health care professionals as needed  Documenting clinical information in the electronic or other health record  Independently interpreting results (not reported separately) and communicating results to the patient/family/caregiver  Care coordination (not reported separately)    Objective:    BP (!) 168/62   Pulse 80   Temp 97.8 °F (36.6 °C) (Oral)   Resp 18   Ht 5' 3\" (1.6 m)   Wt 156 lb (70.8 kg)   SpO2 99%   BMI 27.63 kg/m²   Wt Readings from Last 3 Encounters:   09/08/22 156 lb (70.8 kg)   08/18/22 156 lb (70.8 kg)   07/07/22 157 lb (71.2 kg)       PHYSICAL EXAM  General: Alert and in no acute distress. Normal appearing  Skin: Warm and dry, no rash  Head: Normocephalic and atraumatic  Eyes: Extraocular eye movements intact, conjunctivae normal, and sclera anicteric  ENT: Hearing grossly normal bilaterally. Normal appearance  Respiratory: no chest wall tenderness. no respiratory distress  GI: Abdomen non-tender and benign  Musculoskeletal: Baseline range of motion in joints. Nontender calves. No cyanosis. Edema 2+ areas noted as above. .  Neurologic: Speech normal. At baseline without new focal deficits.  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    pt had a hangmail which infected       FAMILY HISTORY    Family History   Problem Relation Age of Onset    Diabetes Sister     Heart Disease Sister     Heart Disease Father        SOCIAL HISTORY    Social History     Tobacco Use    Smoking status: Never    Smokeless tobacco: Never   Vaping Use    Vaping Use: Never used   Substance Use Topics    Alcohol use: No    Drug use: No       ALLERGIES    Allergies   Allergen Reactions    Penicillins Rash       MEDICATIONS    Current Outpatient Medications on File Prior to Encounter   Medication Sig Dispense Refill    atenolol (TENORMIN) 50 MG tablet       furosemide (LASIX) 20 MG tablet TAKE 1 TABLET BY MOUTH DAILY 90 tablet 3    Saccharomyces boulardii (DIGESTIVE PROBIOTIC PO) Take 1 tablet by mouth daily      Multiple Vitamin (MULTIVITAMIN PO) Take 1 tablet by mouth daily      losartan (COZAAR) 50 mg tablet Take 1 tablet by mouth daily 90 tablet 3    metFORMIN (GLUCOPHAGE-XR) 500 mg extended release tablet Take 1 tablet by mouth 2 times daily 180 tablet 3    acetaminophen (TYLENOL) 500 MG tablet Take 500 mg by mouth every 6 hours as needed for Pain      Calcium Carb-Cholecalciferol 600-800 MG-UNIT CHEW Take 1 tablet by mouth daily      triamcinolone (KENALOG) 0.1 % cream Apply 1 each topically 2 times daily Apply to periskin of urostomy       No current facility-administered medications on file prior to encounter. Written patient dismissal instructions given to patient and signed by patient or POA.          Electronically signed by Vitor Booth DO on 9/8/2022 at 12:10 PM

## 2022-09-08 NOTE — FLOWSHEET NOTE
09/08/22 1041   Right Leg Edema Point of Measurement   Leg circumference 34.5 cm   Ankle circumference 23.5 cm   Foot circumference 21 cm   Compression Therapy Compression stockings   Wound 08/29/22 Leg Lower;Medial;Right   Date First Assessed: 08/29/22   Primary Wound Type: Venous Ulcer  Location: Leg  Wound Location Orientation: Lower;Medial;Right   Wound Image    Wound Etiology Venous   Dressing Status Old drainage noted   Wound Cleansed Soap and water   Dressing/Treatment Foam   Wound Length (cm) 3 cm   Wound Width (cm) 1.7 cm   Wound Depth (cm) 0.1 cm   Wound Surface Area (cm^2) 5.1 cm^2   Change in Wound Size % (l*w) -4150   Wound Volume (cm^3) 0.51 cm^3   Wound Healing % -4150   Wound Assessment [place]/red;Slough   Drainage Amount Small   Drainage Description Serosanguinous   Odor None   Megan-wound Assessment Dry/flaky   Wound Thickness Description not for Pressure Injury Full thickness   Pain Assessment   Pain Assessment None - Denies Pain

## 2022-09-08 NOTE — DISCHARGE INSTRUCTIONS
Return Appointment:   2 weeks with J Carlos Ruvalcaba DO        Instructions: Right medial lower leg:  Cleanse wound with saline or wound cleanser. Increase protein to 60 g/day; recommend supplementing with protein shakes in addition to protein with meals. Xeroform- apply to wound bed. Cover with ABD pad. Unna boot (preferrably Coflex Zinc) with wound and lower extremity assessment. If there is any problem with the dressing (too tight, slides down,, etc.)  Patient to return to clinic to have re-wrapped by clinician. Continue to protect skin by applying lotion to dry skin (helps stretch instead of breaking open). Home health to change dressing 2 times per week. Elevate throughout the day to manage swelling.

## 2022-09-08 NOTE — WOUND CARE
[employer] Application   Below Knee    NAME:  Deann Nye  YOB: 1935  MEDICAL RECORD NUMBER:  [id number]  DATE:  9/8/2022    Maryan Fairly boot: Appied primary and secondary dressing as ordered. Applied Unna roll from toes to knee overlapping each time. Applied ace wrap or coban from toes to below the knee. Secured with tape and/or metal clips covered with tape. Instructed patient/caregiver to keep dressing dry and intact. DO NOT REMOVE DRESSING. Instructed pt/family/caregiver to report excessive draining, loose bandage, wet dressing, severe pain or tingling in toes. Applied [place] dressing below the knee to right lower leg. Unna Boot(s) were applied per  Guidelines.      Electronically signed by Keerthi Garcia PT, [address] [place] [address],[address] [facility] on 9/8/2022 at 11:39 AM

## 2022-09-08 NOTE — WOUND CARE
Discharge Instructions for  Greg Escobar  [address] [place] [address] [address]  Phone [phone]   Fax [phone]      NAME:  Marlene Weems  YOB: 1935  MEDICAL RECORD NUMBER:  [id number]  DATE:  9/8/2022    Return Appointment:   2 weeks with Willy Gimenez DO      Instructions: Right medial lower leg:  Cleanse wound with saline or wound cleanser. Increase protein to 60 g/day; recommend supplementing with protein shakes in addition to protein with meals. Xeroform- apply to wound bed. Cover with ABD pad. Unna boot (preferrably Coflex Zinc) with wound and lower extremity assessment. If there is any problem with the dressing (too tight, slides down,, etc.)  Patient to return to clinic to have re-wrapped by clinician. Continue to protect skin by applying lotion to dry skin (helps stretch instead of breaking open). Home health to change dressing 2 times per week. Elevate throughout the day to manage swelling. Should you experience increased redness, swelling, pain, foul odor, size of wound(s), or have a temperature over 101 degrees please contact the [address] [place] [address] Road at [phone] or if after hours contact your primary care physician or go to the hospital emergency department. PLEASE NOTE: IF YOU ARE UNABLE TO OBTAIN WOUND SUPPLIES, CONTINUE TO USE THE SUPPLIES YOU HAVE AVAILABLE UNTIL YOU ARE ABLE TO REACH US. IT IS MOST IMPORTANT TO KEEP THE WOUND COVERED AT ALL TIMES.     Electronically signed Evangelista Dominguez PT, [facility] on 9/8/2022 at 11:22 AM

## 2022-09-08 NOTE — FLOWSHEET NOTE
09/08/22 1041   Right Leg Edema Point of Measurement   Leg circumference 34.5 cm   Ankle circumference 23.5 cm   Foot circumference 21 cm   Compression Therapy Compression stockings   Wound 08/29/22 Leg Lower;Medial;Right   Date First Assessed: 08/29/22   Primary Wound Type: Venous Ulcer  Location: Leg  Wound Location Orientation: Lower;Medial;Right   Wound Image     Wound Etiology Venous   Dressing Status Old drainage noted   Wound Cleansed Soap and water   Dressing/Treatment Foam   Wound Length (cm) 3 cm   Wound Width (cm) 1.7 cm   Wound Depth (cm) 0.1 cm   Wound Surface Area (cm^2) 5.1 cm^2   Change in Wound Size % (l*w) -4150   Wound Volume (cm^3) 0.51 cm^3   Wound Healing % -4150   Post-Procedure Length (cm) 3 cm   Post-Procedure Width (cm) 1.7 cm   Post-Procedure Depth (cm) 0.1 cm   Post-Procedure Surface Area (cm^2) 5.1 cm^2   Post-Procedure Volume (cm^3) 0.51 cm^3   Wound Assessment [place]/red;Slough   Drainage Amount Small   Drainage Description Serosanguinous   Odor None   Megan-wound Assessment Dry/flaky   Wound Thickness Description not for Pressure Injury Full thickness   Pain Assessment   Pain Assessment None - Denies Pain   Post-debridement photo and measurements as noted above

## 2022-09-09 ENCOUNTER — HOME CARE VISIT (OUTPATIENT)
Dept: SCHEDULING | Facility: HOME HEALTH | Age: 87
End: 2022-09-09
Payer: MEDICARE

## 2022-09-09 VITALS
DIASTOLIC BLOOD PRESSURE: 80 MMHG | RESPIRATION RATE: 16 BRPM | TEMPERATURE: 98 F | HEART RATE: 64 BPM | SYSTOLIC BLOOD PRESSURE: 124 MMHG | OXYGEN SATURATION: 98 %

## 2022-09-09 PROCEDURE — G0157 HHC PT ASSISTANT EA 15: HCPCS

## 2022-09-09 PROCEDURE — 1090000002 HH PPS REVENUE DEBIT

## 2022-09-09 PROCEDURE — 1090000001 HH PPS REVENUE CREDIT

## 2022-09-10 PROCEDURE — 1090000002 HH PPS REVENUE DEBIT

## 2022-09-10 PROCEDURE — 1090000001 HH PPS REVENUE CREDIT

## 2022-09-11 PROCEDURE — 1090000001 HH PPS REVENUE CREDIT

## 2022-09-11 PROCEDURE — 1090000002 HH PPS REVENUE DEBIT

## 2022-09-12 ENCOUNTER — HOME CARE VISIT (OUTPATIENT)
Dept: SCHEDULING | Facility: HOME HEALTH | Age: 87
End: 2022-09-12
Payer: MEDICARE

## 2022-09-12 VITALS
OXYGEN SATURATION: 98 % | SYSTOLIC BLOOD PRESSURE: 130 MMHG | DIASTOLIC BLOOD PRESSURE: 64 MMHG | RESPIRATION RATE: 16 BRPM | TEMPERATURE: 98.4 F | HEART RATE: 68 BPM

## 2022-09-12 VITALS
OXYGEN SATURATION: 98 % | DIASTOLIC BLOOD PRESSURE: 65 MMHG | HEART RATE: 68 BPM | RESPIRATION RATE: 16 BRPM | SYSTOLIC BLOOD PRESSURE: 152 MMHG | TEMPERATURE: 97.5 F

## 2022-09-12 PROCEDURE — 1090000001 HH PPS REVENUE CREDIT

## 2022-09-12 PROCEDURE — 1090000002 HH PPS REVENUE DEBIT

## 2022-09-12 PROCEDURE — G0151 HHCP-SERV OF PT,EA 15 MIN: HCPCS

## 2022-09-12 PROCEDURE — G0299 HHS/HOSPICE OF RN EA 15 MIN: HCPCS

## 2022-09-12 ASSESSMENT — ENCOUNTER SYMPTOMS
DYSPNEA ACTIVITY LEVEL: AFTER AMBULATING MORE THAN 20 FT
STOOL DESCRIPTION: SOFT FORMED

## 2022-09-13 PROCEDURE — 1090000002 HH PPS REVENUE DEBIT

## 2022-09-13 PROCEDURE — 1090000001 HH PPS REVENUE CREDIT

## 2022-09-14 PROCEDURE — 1090000001 HH PPS REVENUE CREDIT

## 2022-09-14 PROCEDURE — 1090000002 HH PPS REVENUE DEBIT

## 2022-09-15 ENCOUNTER — HOME CARE VISIT (OUTPATIENT)
Dept: SCHEDULING | Facility: HOME HEALTH | Age: 87
End: 2022-09-15
Payer: MEDICARE

## 2022-09-15 VITALS
TEMPERATURE: 97 F | RESPIRATION RATE: 16 BRPM | OXYGEN SATURATION: 97 % | DIASTOLIC BLOOD PRESSURE: 71 MMHG | SYSTOLIC BLOOD PRESSURE: 162 MMHG | HEART RATE: 70 BPM

## 2022-09-15 PROCEDURE — 1090000001 HH PPS REVENUE CREDIT

## 2022-09-15 PROCEDURE — G0299 HHS/HOSPICE OF RN EA 15 MIN: HCPCS

## 2022-09-15 PROCEDURE — 1090000002 HH PPS REVENUE DEBIT

## 2022-09-15 ASSESSMENT — ENCOUNTER SYMPTOMS: STOOL DESCRIPTION: SOFT FORMED

## 2022-09-16 PROCEDURE — 1090000002 HH PPS REVENUE DEBIT

## 2022-09-16 PROCEDURE — 1090000001 HH PPS REVENUE CREDIT

## 2022-09-17 PROCEDURE — 1090000001 HH PPS REVENUE CREDIT

## 2022-09-17 PROCEDURE — 1090000002 HH PPS REVENUE DEBIT

## 2022-09-18 PROCEDURE — 1090000002 HH PPS REVENUE DEBIT

## 2022-09-18 PROCEDURE — 1090000001 HH PPS REVENUE CREDIT

## 2022-09-19 ENCOUNTER — HOME CARE VISIT (OUTPATIENT)
Dept: SCHEDULING | Facility: HOME HEALTH | Age: 87
End: 2022-09-19
Payer: MEDICARE

## 2022-09-19 VITALS
HEART RATE: 70 BPM | DIASTOLIC BLOOD PRESSURE: 70 MMHG | RESPIRATION RATE: 18 BRPM | SYSTOLIC BLOOD PRESSURE: 148 MMHG | OXYGEN SATURATION: 97 % | TEMPERATURE: 98.5 F

## 2022-09-19 PROCEDURE — G0299 HHS/HOSPICE OF RN EA 15 MIN: HCPCS

## 2022-09-19 PROCEDURE — 1090000002 HH PPS REVENUE DEBIT

## 2022-09-19 PROCEDURE — 1090000001 HH PPS REVENUE CREDIT

## 2022-09-19 ASSESSMENT — ENCOUNTER SYMPTOMS
PAIN LOCATION - PAIN QUALITY: ACHING
STOOL DESCRIPTION: SOFT FORMED

## 2022-09-20 PROCEDURE — 1090000001 HH PPS REVENUE CREDIT

## 2022-09-20 PROCEDURE — 1090000002 HH PPS REVENUE DEBIT

## 2022-09-21 PROCEDURE — 1090000002 HH PPS REVENUE DEBIT

## 2022-09-21 PROCEDURE — 1090000001 HH PPS REVENUE CREDIT

## 2022-09-22 ENCOUNTER — HOME CARE VISIT (OUTPATIENT)
Dept: SCHEDULING | Facility: HOME HEALTH | Age: 87
End: 2022-09-22
Payer: MEDICARE

## 2022-09-22 ENCOUNTER — HOSPITAL ENCOUNTER (OUTPATIENT)
Dept: WOUND CARE | Age: 87
Discharge: HOME | End: 2022-09-22
Payer: MEDICARE

## 2022-09-22 VITALS
SYSTOLIC BLOOD PRESSURE: 194 MMHG | DIASTOLIC BLOOD PRESSURE: 80 MMHG | HEART RATE: 64 BPM | TEMPERATURE: 98.2 F | RESPIRATION RATE: 18 BRPM

## 2022-09-22 PROCEDURE — 1090000001 HH PPS REVENUE CREDIT

## 2022-09-22 PROCEDURE — 99214 OFFICE O/P EST MOD 30 MIN: CPT | Performed by: SURGERY

## 2022-09-22 PROCEDURE — 1090000002 HH PPS REVENUE DEBIT

## 2022-09-22 PROCEDURE — 29580 STRAPPING UNNA BOOT: CPT

## 2022-09-22 NOTE — FLOWSHEET NOTE
09/22/22 1036   Wound 08/29/22 Leg Lower;Medial;Right   Date First Assessed: 08/29/22   Primary Wound Type: Venous Ulcer  Location: Leg  Wound Location Orientation: Lower;Medial;Right   Wound Image    Wound Etiology Venous   Dressing Status Intact   Wound Cleansed Cleansed with saline   Wound Length (cm) 0.5 cm   Wound Width (cm) 0.4 cm   Wound Depth (cm) 0.1 cm   Wound Surface Area (cm^2) 0.2 cm^2   Change in Wound Size % (l*w) -66.67   Wound Volume (cm^3) 0.02 cm^3   Wound Healing % -67   Wound Assessment Pink/red   Drainage Amount Small   Drainage Description Serosanguinous   Odor None   Megan-wound Assessment Edematous   Wound Thickness Description not for Pressure Injury Full thickness

## 2022-09-22 NOTE — WOUND CARE
[employer] Application   Below Knee    NAME:  Deann Nye  YOB: 1935  MEDICAL RECORD NUMBER:  [id number]  DATE:  9/22/2022    Maryan Jackson boot: Applied moisturizing agent to dry skin as needed. Appied primary and secondary dressing as ordered. Applied Unna roll from toes to knee overlapping each time. Applied ace wrap or coban from toes to below the knee. Secured with tape and/or metal clips covered with tape. Instructed patient/caregiver to keep dressing dry and intact. DO NOT REMOVE DRESSING. Instructed pt/family/caregiver to report excessive draining, loose bandage, wet dressing, severe pain or tingling in toes. Applied [employer] dressing below the knee to right lower leg. Unna Boot(s) were applied per  Guidelines.      Electronically signed by Charles Baumann RN on 9/22/2022 at 11:17 AM

## 2022-09-22 NOTE — PROGRESS NOTES
[address] [facility] [address]    [address] [place], [place]  Consult Note/H&P/Progress Note      Keegan Lovelace RECORD NUMBER:  [id number]  AGE: 81 y.o. RACE White (non-)  GENDER: female  : 1935  EPISODE DATE:  2022       Subjective:      CC (Chief Complaint) and HISTORY of PRESENT ILLNESS (HPI):    Joyce [place] is a 81 y.o. female who presents today for wound/ulcer evaluation. Her first visit to the wound center with us was on 22  She reported progressive ulcerations and blistering of her right lower extremity since approximately 2021. Nothing in particular made her condition better or worse. She associated the condition with use of a venous thrombosis pump which was placed on her leg in the summer 2021.   No associated fevers     She has been diabetic for approximately 20 years         This information was obtained from the patient  The following HPI elements were documented for the patient's wound:  Location:  Right lower extremity ulcers  Duration: Since approximately 2021  Severity : Moderate severity  Context: History of diabetes mellitus for 20 years  Modifying Factors:  Nothing makes better or worse  Quality: No reported history of DVT  Timing:     Associated Signs and Symptoms : No fevers         Ulcer Identification:   Ulcer Type: venous      Contributing Factors: venous stasis, lymphedema and diabetes                PAST MEDICAL HISTORY  Past Medical History:   Diagnosis Date    Arthritis     Cancer ([address]     Diabetes ([address]     History of urostomy     Hypertension         PAST SURGICAL HISTORY  Past Surgical History:   Procedure Laterality Date    ANTERIOR CRUCIATE LIGAMENT REPAIR      CATARACT REMOVAL Left     CHOLECYSTECTOMY      GYN      HX CYSTECTOMY  2008    Bladder removal wears a bag    HYSTERECTOMY, VAGINAL      ORTHOPEDIC SURGERY      TOE AMPUTATION  2021    pt had a hangmail which infected       FAMILY HISTORY  Family History   Problem Relation Age of Onset    Diabetes Sister     Heart Disease Sister     Heart Disease Father        SOCIAL HISTORY  Social History     Tobacco Use    Smoking status: Never    Smokeless tobacco: Never   Vaping Use    Vaping Use: Never used   Substance Use Topics    Alcohol use: No    Drug use: No       ALLERGIES  Allergies   Allergen Reactions    Penicillins Rash       MEDICATIONS  Current Outpatient Medications on File Prior to Encounter   Medication Sig Dispense Refill    atenolol (TENORMIN) 50 MG tablet       furosemide (LASIX) 20 MG tablet TAKE 1 TABLET BY MOUTH DAILY 90 tablet 3    Saccharomyces boulardii (DIGESTIVE PROBIOTIC PO) Take 1 tablet by mouth daily      Multiple Vitamin (MULTIVITAMIN PO) Take 1 tablet by mouth daily      losartan (COZAAR) 50 mg tablet Take 1 tablet by mouth daily 90 tablet 3    metFORMIN (GLUCOPHAGE-XR) 500 mg extended release tablet Take 1 tablet by mouth 2 times daily 180 tablet 3    acetaminophen (TYLENOL) 500 MG tablet Take 500 mg by mouth every 6 hours as needed for Pain      Calcium Carb-Cholecalciferol 600-800 MG-UNIT CHEW Take 1 tablet by mouth daily      triamcinolone (KENALOG) 0.1 % cream Apply 1 each topically 2 times daily Apply to periskin of urostomy       No current facility-administered medications on file prior to encounter. REVIEW OF SYSTEMS  The patient has no difficulty with chest pain or shortness of breath. No fever or chills. Comprehensive review of systems was otherwise unremarkable except as noted above.       Objective:   Physical Exam:   Recent vitals (if inpt):  Patient Vitals for the past 24 hrs:   BP Temp Temp src Pulse Resp   09/22/22 1040 (!) 194/80 98.2 °F (36.8 °C) Temporal 64 18           BP (!) 194/80   Pulse 64   Temp 98.2 °F (36.8 °C) (Temporal)   Resp 18   Wt Readings from Last 3 Encounters:   09/08/22 156 lb (70.8 kg)   08/18/22 156 lb (70.8 kg)   07/07/22 157 lb (71.2 kg)     Constitutional: Alert, oriented, cooperative patient in no acute distress; appears stated age;  General appearance is within normal limits for wound care patient population. See the today's recorded vitals signs and constitutional data. Eyes: Pupils equal; Sclera are clear. EOMs intact; The eyes appear to track and move normally. The sclera are not injected. The conjunctive are clear. The eyelids are normal. There is no scleral icterus. ENMT: There are no obvious external ear, nose, lip or mouth lesions. Nares normal; Neck: Overall contour of the neck is normal with no obvious neck masses. Gross hearing is within normal limits. No obvious neck masses  Resp:  Breathing is non-labored; normal rate and effort; no audible wheezing. CV: RRR;  no JVD; No evidence of cyanosis of the upper extremities. The extremities are perfused without embolic sign, splinter hemorrhages, or petechia. GI: soft and non-distended without acute abnormality noted. Musculoskeletal: unremarkable with normal function. No embolic signs or cyanosis. Neuro:  Oriented; moves all 4; no focal deficits  Psychiatric: Judgement and insight are within normal limits for the wound care population of patients. Patient is oriented to person, place, and time. Recent and remote memory are within normal limits. Mood and affect are within normal limits. Integumentary (Skin/Wounds)  See inspection of wound(s) below. There are no other skin areas of palpable concern. See wound center documentation including photos in the [address] Wound Template made part of this record by reference. Wound Care Documentation:    Wound 08/22/22 Leg Lower;Right (Active)   Wound Etiology Venous 09/19/22 1647   Dressing Status New dressing applied 09/19/22 1647   Wound Cleansed Wound cleanser 09/19/22 1647   Dressing/Treatment ABD;Xeroform; Other (comment) 09/19/22 1647   Wound Length (cm) 0 cm 09/19/22 1647   Wound Width (cm) 0 cm 09/19/22 1647   Wound Depth (cm) 0 cm 09/19/22 1647   Wound Surface Area (cm^2) 0 cm^2 09/19/22 1647   Change in Wound Size % (l*w) 100 09/19/22 1647   Wound Volume (cm^3) 0 cm^3 09/19/22 1647   Wound Healing % 100 09/19/22 1647   Wound Assessment Dry 09/19/22 1647   Drainage Amount None 09/19/22 1647   Drainage Description Serosanguinous 09/05/22 1113   Odor None 09/19/22 1647   Megan-wound Assessment Edematous;Fragile 09/19/22 1647   Margins Defined edges 09/05/22 1113   Wound Thickness Description not for Pressure Injury Partial thickness 09/05/22 1113   Number of days: 31       Wound 08/29/22 Leg Lower;Medial;Right (Active)   Wound Image   09/22/22 1036   Wound Etiology Venous 09/22/22 1036   Dressing Status Intact 09/22/22 1036   Wound Cleansed Cleansed with saline 09/22/22 1036   Dressing/Treatment ABD;Xeroform 09/19/22 1647   Wound Length (cm) 0.5 cm 09/22/22 1036   Wound Width (cm) 0.4 cm 09/22/22 1036   Wound Depth (cm) 0.1 cm 09/22/22 1036   Wound Surface Area (cm^2) 0.2 cm^2 09/22/22 1036   Change in Wound Size % (l*w) -66.67 09/22/22 1036   Wound Volume (cm^3) 0.02 cm^3 09/22/22 1036   Wound Healing % -67 09/22/22 1036   Post-Procedure Length (cm) 3 cm 09/08/22 1041   Post-Procedure Width (cm) 1.7 cm 09/08/22 1041   Post-Procedure Depth (cm) 0.1 cm 09/08/22 1041   Post-Procedure Surface Area (cm^2) 5.1 cm^2 09/08/22 1041   Post-Procedure Volume (cm^3) 0.51 cm^3 09/08/22 1041   Wound Assessment Pink/red 09/22/22 1036   Drainage Amount Small 09/22/22 1036   Drainage Description Serosanguinous 09/22/22 1036   Odor None 09/22/22 1036   Megan-wound Assessment Edematous 09/22/22 1036   Margins Defined edges 09/19/22 1647   Wound Thickness Description not for Pressure Injury Full thickness 09/22/22 1036   Number of days: 24                                                                                    Amount and/or Complexity of Data Reviewed and Analyzed:  I reviewed and analyzed all of the unique labs and radiologic studies that are shown below as well as any that are in the HPI, and any that are in the expanded problem list below  *Each unique test, order, or document contributes to the combination of 2 or combination of 3 in Category 1 below. I also independently reviewed radiology images for studies I described above in the HPI or studies I have ordered. For this visit I also reviewed old records and prior notes. No results for input(s): WBC, HGB, PLT, NA, K, CL, CO2, BUN, CREA, GLU, INR, APTT, ALT, AML, AML, LCAD, PCO2, PO2, HCO3 in the last 72 hours. Invalid input(s): PTP, TBIL, TBILI, CBIL, SGOT, GPT, AP, LPSE, NH4, TROPT, TROIQ,  PH  No results for input(s): INR, APTT, ALT, AML in the last 72 hours.     Invalid input(s): PTP, TBIL, TBILI, CBIL, SGOT, GPT, AP, LPSE    Most recent blood counts (CBC) review  Lab Results   Component Value Date    WBC 6.5 04/06/2022    HGB 10.3 (L) 04/06/2022    HCT 32.3 (L) 04/06/2022    MCV 93 04/06/2022     04/06/2022     Most recent chemistry (BMP) test review   Lab Results   Component Value Date/Time     04/06/2022 04:31 PM    K 4.8 04/06/2022 04:31 PM     04/06/2022 04:31 PM    CO2 19 04/06/2022 04:31 PM    BUN 30 04/06/2022 04:31 PM    CREATININE 1.38 04/06/2022 04:31 PM    GLUCOSE 119 04/06/2022 04:31 PM    CALCIUM 9.7 04/06/2022 04:31 PM      Most recent Liver enzyme test review  Lab Results   Component Value Date    ALT 13 04/06/2022    AST 26 04/06/2022    ALKPHOS 104 04/06/2022    BILITOT 0.3 04/06/2022     Most recent coagulation (coags) review  @lastinr@  No results found for: INR, APTT, AML, AML, LCAD    Diabetic assessment  Lab Results   Component Value Date    LABA1C 6.9 (H) 04/06/2022     Lab Results   Component Value Date     04/06/2022       Nutritional assessment screen to assess wound healing ability:  Lab Results   Component Value Date    LABALBU 4.0 04/06/2022     No results found for: TP, ALBR, ALB    @lastXray@  @lastCT@      Admission date (for inpatients): 9/22/2022   Day of Surgery  * No procedures listed *    Assessment:      Problem List Items Addressed This Visit    None      [unfilled]    Active Problems:    Dermatitis    Open wound of right foot    BMI 27.0-27.9,adult    Non-pressure chronic ulcer of right lower leg with fat layer exposed ([address]    Type 2 diabetes mellitus with skin complication, without long-term current use of insulin (HCC)    Lymphedema    Venous stasis dermatitis of right lower extremity  Resolved Problems:    * No resolved hospital problems.  *      Patient Active Problem List    Diagnosis Date Noted    Non-pressure chronic ulcer of right lower leg with fat layer exposed ([address] 09/08/2022     Priority: Medium    Open wound of right foot 06/16/2022     Priority: Medium    BMI 27.0-27.9,adult 06/16/2022     Priority: Medium    Dermatitis 06/09/2022     Priority: Medium    Artificial opening status, unspecified ([address] 06/06/2022     Priority: Medium    BMI 30.0-30.9,adult 04/29/2022    Chronic venous htn w ulcer and inflammation of r low extrem ([address] 04/29/2022    Lymphedema 04/29/2022    Venous stasis dermatitis of right lower extremity 04/29/2022    History of total cystectomy 07/28/2020 2/2008--due to bladder CA        History of bladder cancer 07/28/2020    Retinal edema 02/28/2020    Intermediate stage nonexudative age-related macular degeneration of left eye 02/28/2020    Arthritis 12/13/2018     Lower back        Essential hypertension 12/13/2018    Type 2 diabetes mellitus with skin complication, without long-term current use of insulin ([address] 12/13/2018           Plan:     Number and Complexity of Problems addressed and   Risks of complications and/or morbidity of management    Treatment Note please see attached Discharge Instructions  Any procedures done today in the wound center (if documented in a separate note) in [facility] are made part of this record by reference  Written patient dismissal instructions given to patient or POA. Right lower extremity venous stasis ulcers  Weekly multilayer compression dressings. We encouraged elevation  We encouraged her to purchase a cast cover from [employer] to keep the dressing dry. Discontinued hydrofera to the right lower extremity venous ulcer on 7/21/2022 and converted to zinc patches under compression   Apply zinc oxide to pretibial shin where there is some contact dermatitis from bandaging    As of 8/18/22 her venous ulcer completely healed. She brought in knee-high compression stockings and was instructed to wear these daily and replace them as needed. She returned on 9/22/2022 with recurrent venous ulceration of her right lower extremity. She will get multilayer compression dressings twice a week over [facility] applications           Right lateral forefoot pressure ulcer from bandage new on 6/16/2022  Most recent dressing change done by home health  Apply Hydrofera Blue and wrap in that area. Cushion and pad that area to distribute pressure pressure better  Asked patient to offload more to prevent pressurization of lateral forefoot with ambulation    This healed by 8/18/2022  She will see podiatry next for recommendations on shoe wear and to get her nails trimmed.     Venous dermatitis  Weekly Desitin     Lymphedema  Elevation  Salt avoidance  Compression  Weight loss       DM2 with HgbA1c 6.9 on 4/6/22  Encourage adherence to diabetic diet and diabetic medical regimen to help facilitate wound healing  Encourage close follow-up with primary care physician  Encourage HgbA1c q3 months        BMI>30-->29  Encourage weight loss       Treatment significantly limited by social determinants of health  The patient so far has not been able to be compliant with significant reduction in her BMI   which is exacerbating lymphedema and impairing our ability   to treat her dermatitis, venous hypertension, and chronic venous leg ulcers requiring an independent historian(s)    or  Category 2: Independent interpretation of tests   ? Independent interpretation of a test performed by another physician/other qualified health care professional (not separately reported);     or  Category 3: Discussion of management or test interpretation  ? Discussion of management or test interpretation with external physician/other qualified health care professional/appropriate source (not separately reported)   Moderate risk of morbidity from additional diagnostic testing or treatment  Examples only:  ?Prescription drug management -Hydrofera   ? Decision regarding minor surgery with identified patient or procedure risk factors  ? Decision regarding elective major surgery without identified patient or procedure risk factors   ? Diagnosis or treatment significantly limited by social determinants of health       77468  00291 High High  ? 1or more chronic illnesses with severe exacerbation, progression, or side effects of treatment;    or  ?1 acute or chronic illness or injury that poses a threat to life or bodily function   Extensive  (Must meet the requirements of at least 2 out of 3 categories)  Category 1: Tests, documents, or independent historian(s)  ? Any combination of 3 from the following:   ?Review of prior external note(s) from each unique source*;  ?Review of the result(s) of each unique test*;   ?Ordering of each unique test*;   ?Assessment requiring an independent historian(s)    or   Category 2: Independent interpretation of tests   ? Independent interpretation of a test performed by another physician/other qualified health care professional (not separately reported);     or  Category 3: Discussion of management or test interpretation  ? Discussion of management or test interpretation with external physician/other qualified health care professional/appropriate source (not separately reported)   High risk of morbidity from additional diagnostic testing or treatment  Examples only:  ?Drug therapy requiring intensive monitoring for toxicity  ? Decision regarding elective major surgery with identified patient or procedure risk factors  ? Decision regarding emergency major surgery  ? Decision regarding hospitalization  ? Decision not to resuscitate or to de-escalate care because of poor prognosis                 I have personally performed a face-to-face diagnostic evaluation and management  service on this patient. I have independently seen the patient. I have independently obtained the above history from the patient/family. I have independently examined the patient with above findings. I have independently reviewed data/labs for this patient and developed the above plan of care (MDM).       Electronically signed by Sammy Alcala MD on 9/22/2022 at 10:54 AM

## 2022-09-23 PROCEDURE — 1090000002 HH PPS REVENUE DEBIT

## 2022-09-23 PROCEDURE — 1090000001 HH PPS REVENUE CREDIT

## 2022-09-24 PROCEDURE — 1090000002 HH PPS REVENUE DEBIT

## 2022-09-24 PROCEDURE — 1090000001 HH PPS REVENUE CREDIT

## 2022-09-25 PROCEDURE — 1090000002 HH PPS REVENUE DEBIT

## 2022-09-25 PROCEDURE — 1090000001 HH PPS REVENUE CREDIT

## 2022-09-26 ENCOUNTER — HOME CARE VISIT (OUTPATIENT)
Dept: SCHEDULING | Facility: HOME HEALTH | Age: 87
End: 2022-09-26
Payer: MEDICARE

## 2022-09-26 VITALS
SYSTOLIC BLOOD PRESSURE: 174 MMHG | HEART RATE: 69 BPM | RESPIRATION RATE: 16 BRPM | TEMPERATURE: 98.1 F | OXYGEN SATURATION: 98 % | DIASTOLIC BLOOD PRESSURE: 74 MMHG

## 2022-09-26 PROCEDURE — 1090000001 HH PPS REVENUE CREDIT

## 2022-09-26 PROCEDURE — G0299 HHS/HOSPICE OF RN EA 15 MIN: HCPCS

## 2022-09-26 PROCEDURE — 1090000002 HH PPS REVENUE DEBIT

## 2022-09-26 ASSESSMENT — ENCOUNTER SYMPTOMS: STOOL DESCRIPTION: SOFT FORMED

## 2022-09-27 PROCEDURE — 1090000001 HH PPS REVENUE CREDIT

## 2022-09-27 PROCEDURE — 1090000002 HH PPS REVENUE DEBIT

## 2022-09-28 PROCEDURE — 1090000002 HH PPS REVENUE DEBIT

## 2022-09-28 PROCEDURE — 1090000001 HH PPS REVENUE CREDIT

## 2022-09-29 ENCOUNTER — HOSPITAL ENCOUNTER (OUTPATIENT)
Dept: WOUND CARE | Age: 87
Discharge: HOME | End: 2022-09-29
Payer: MEDICARE

## 2022-09-29 VITALS
BODY MASS INDEX: 27.64 KG/M2 | DIASTOLIC BLOOD PRESSURE: 74 MMHG | RESPIRATION RATE: 18 BRPM | SYSTOLIC BLOOD PRESSURE: 167 MMHG | HEIGHT: 63 IN | WEIGHT: 156 LBS

## 2022-09-29 PROCEDURE — 1090000001 HH PPS REVENUE CREDIT

## 2022-09-29 PROCEDURE — 99213 OFFICE O/P EST LOW 20 MIN: CPT

## 2022-09-29 PROCEDURE — 99214 OFFICE O/P EST MOD 30 MIN: CPT | Performed by: SURGERY

## 2022-09-29 PROCEDURE — 1090000002 HH PPS REVENUE DEBIT

## 2022-09-29 RX ORDER — LIDOCAINE HYDROCHLORIDE 20 MG/ML
JELLY TOPICAL ONCE
Status: CANCELLED | OUTPATIENT
Start: 2022-09-29 | End: 2022-09-29

## 2022-09-29 RX ORDER — LIDOCAINE 40 MG/G
CREAM TOPICAL ONCE
Status: CANCELLED | OUTPATIENT
Start: 2022-09-29 | End: 2022-09-29

## 2022-09-29 RX ORDER — BETAMETHASONE DIPROPIONATE 0.05 %
OINTMENT (GRAM) TOPICAL ONCE
Status: CANCELLED | OUTPATIENT
Start: 2022-09-29 | End: 2022-09-29

## 2022-09-29 RX ORDER — BACITRACIN ZINC AND POLYMYXIN B SULFATE 500; 1000 [USP'U]/G; [USP'U]/G
OINTMENT TOPICAL ONCE
Status: CANCELLED | OUTPATIENT
Start: 2022-09-29 | End: 2022-09-29

## 2022-09-29 RX ORDER — BACITRACIN, NEOMYCIN, POLYMYXIN B 400; 3.5; 5 [USP'U]/G; MG/G; [USP'U]/G
OINTMENT TOPICAL ONCE
Status: CANCELLED | OUTPATIENT
Start: 2022-09-29 | End: 2022-09-29

## 2022-09-29 RX ORDER — GINSENG 100 MG
CAPSULE ORAL ONCE
Status: CANCELLED | OUTPATIENT
Start: 2022-09-29 | End: 2022-09-29

## 2022-09-29 RX ORDER — CLOBETASOL PROPIONATE 0.5 MG/G
OINTMENT TOPICAL ONCE
Status: CANCELLED | OUTPATIENT
Start: 2022-09-29 | End: 2022-09-29

## 2022-09-29 RX ORDER — GENTAMICIN SULFATE 1 MG/G
OINTMENT TOPICAL ONCE
Status: CANCELLED | OUTPATIENT
Start: 2022-09-29 | End: 2022-09-29

## 2022-09-29 RX ORDER — LIDOCAINE HYDROCHLORIDE 40 MG/ML
SOLUTION TOPICAL ONCE
Status: CANCELLED | OUTPATIENT
Start: 2022-09-29 | End: 2022-09-29

## 2022-09-29 RX ORDER — LIDOCAINE 50 MG/G
OINTMENT TOPICAL ONCE
Status: CANCELLED | OUTPATIENT
Start: 2022-09-29 | End: 2022-09-29

## 2022-09-29 NOTE — WOUND CARE
Discharge Instructions for  Greg Escobar  [address]  [address] [address] [facility], [address]  Phone [phone]   Fax [phone]      NAME:  Karyn Alva  YOB: 1935  MEDICAL RECORD NUMBER:  [id number]  DATE:  9/29/2022    Return Appointment:   Discharge with Michael Ochoa MD      Instructions:   Right Lower Leg Wound Resolved! Moisturize lower legs after showering at night. Double Tubigrip in [address] [facility] today. Compression Stockings 20-30 mmHg from morning to night every day. May also visit local pharmacies such as [facility] ([address] [place] [address] Dr) for [employer]. Search Online for compression stockings:  Knee High Venous Stasis Stockings 20-30 mmHg    Refresh every 6 months. Remember :    Diabetes Management: Diabetic Diet and Diabetes Medication Regimen. Low Salt Diet    Elevate lower legs when at rest.    Discharged from [facility]          Should you experience increased redness, swelling, pain, foul odor, size of wound(s), or have a temperature over 101 degrees please contact the [address] [place] [address] Road at [phone] or if after hours contact your primary care physician or go to the hospital emergency department. PLEASE NOTE: IF YOU ARE UNABLE TO OBTAIN WOUND SUPPLIES, CONTINUE TO USE THE SUPPLIES YOU HAVE AVAILABLE UNTIL YOU ARE ABLE TO REACH US. IT IS MOST IMPORTANT TO KEEP THE WOUND COVERED AT ALL TIMES. Electronically signed Shun Arthur.  Pebbles Cash RN on 9/29/2022 at 10:53 AM

## 2022-09-29 NOTE — PROGRESS NOTES
[address] [facility] [address]    [address] [place], [place]  Consult Note/H&P/Progress Note      Keegan Lovelace RECORD NUMBER:  [id number]  AGE: 81 y.o. RACE White (non-)  GENDER: female  : 1935  EPISODE DATE:  2022       Subjective:      CC (Chief Complaint) and HISTORY of PRESENT ILLNESS (HPI):    Ad Thomas is a 81 y.o. female who presents today for wound/ulcer evaluation. Her first visit to the wound center with us was on 22  She reported progressive ulcerations and blistering of her right lower extremity since approximately 2021. Nothing in particular made her condition better or worse. She associated the condition with use of a venous thrombosis pump which was placed on her leg in the summer 2021.   No associated fevers     She has been diabetic for approximately 20 years         This information was obtained from the patient  The following HPI elements were documented for the patient's wound:  Location:  Right lower extremity ulcers  Duration: Since approximately 2021  Severity : Moderate severity  Context: History of diabetes mellitus for 20 years  Modifying Factors:  Nothing makes better or worse  Quality: No reported history of DVT  Timing:     Associated Signs and Symptoms : No fevers         Ulcer Identification:   Ulcer Type: venous      Contributing Factors: venous stasis, lymphedema and diabetes                PAST MEDICAL HISTORY  Past Medical History:   Diagnosis Date    Arthritis     Cancer ([address]     Diabetes ([address]     History of urostomy     Hypertension         PAST SURGICAL HISTORY  Past Surgical History:   Procedure Laterality Date    ANTERIOR CRUCIATE LIGAMENT REPAIR      CATARACT REMOVAL Left     CHOLECYSTECTOMY      GYN      HX CYSTECTOMY  2008    Bladder removal wears a bag    HYSTERECTOMY, VAGINAL      ORTHOPEDIC SURGERY      TOE AMPUTATION  2021    pt had a hangmail which infected       FAMILY HISTORY  Family History   Problem Relation Age of Onset    Diabetes Sister     Heart Disease Sister     Heart Disease Father        SOCIAL HISTORY  Social History     Tobacco Use    Smoking status: Never    Smokeless tobacco: Never   Vaping Use    Vaping Use: Never used   Substance Use Topics    Alcohol use: No    Drug use: No       ALLERGIES  Allergies   Allergen Reactions    Penicillins Rash       MEDICATIONS  Current Outpatient Medications on File Prior to Encounter   Medication Sig Dispense Refill    atenolol (TENORMIN) 50 MG tablet       furosemide (LASIX) 20 MG tablet TAKE 1 TABLET BY MOUTH DAILY 90 tablet 3    Saccharomyces boulardii (DIGESTIVE PROBIOTIC PO) Take 1 tablet by mouth daily      Multiple Vitamin (MULTIVITAMIN PO) Take 1 tablet by mouth daily      losartan (COZAAR) 50 mg tablet Take 1 tablet by mouth daily 90 tablet 3    metFORMIN (GLUCOPHAGE-XR) 500 mg extended release tablet Take 1 tablet by mouth 2 times daily 180 tablet 3    acetaminophen (TYLENOL) 500 MG tablet Take 500 mg by mouth every 6 hours as needed for Pain      Calcium Carb-Cholecalciferol 600-800 MG-UNIT CHEW Take 1 tablet by mouth daily      triamcinolone (KENALOG) 0.1 % cream Apply 1 each topically 2 times daily Apply to periskin of urostomy       No current facility-administered medications on file prior to encounter. REVIEW OF SYSTEMS  The patient has no difficulty with chest pain or shortness of breath. No fever or chills. Comprehensive review of systems was otherwise unremarkable except as noted above.       Objective:   Physical Exam:   Recent vitals (if inpt):  Patient Vitals for the past 24 hrs:   BP Resp Height Weight   09/29/22 1033 (!) 167/74 18 5' 3\" (1.6 m) 156 lb (70.8 kg)           BP (!) 167/74   Resp 18   Ht 5' 3\" (1.6 m)   Wt 156 lb (70.8 kg)   BMI 27.63 kg/m²   Wt Readings from Last 3 Encounters:   09/29/22 156 lb (70.8 kg)   09/08/22 156 lb (70.8 kg)   08/18/22 156 lb (70.8 kg)     Constitutional: 1036   Wound Depth (cm) 0 cm 09/29/22 1036   Wound Surface Area (cm^2) 0 cm^2 09/29/22 1036   Change in Wound Size % (l*w) 100 09/29/22 1036   Wound Volume (cm^3) 0 cm^3 09/29/22 1036   Wound Healing % 100 09/29/22 1036   Post-Procedure Length (cm) 3 cm 09/08/22 1041   Post-Procedure Width (cm) 1.7 cm 09/08/22 1041   Post-Procedure Depth (cm) 0.1 cm 09/08/22 1041   Post-Procedure Surface Area (cm^2) 5.1 cm^2 09/08/22 1041   Post-Procedure Volume (cm^3) 0.51 cm^3 09/08/22 1041   Wound Assessment Epithelialization 09/29/22 1036   Drainage Amount None 09/29/22 1036   Drainage Description Serosanguinous 09/22/22 1036   Odor None 09/26/22 1418   Megan-wound Assessment Intact 09/29/22 1036   Margins Defined edges 09/26/22 1418   Wound Thickness Description not for Pressure Injury Partial thickness 09/26/22 1418   Number of days: 31                                                                                      Amount and/or Complexity of Data Reviewed and Analyzed:  I reviewed and analyzed all of the unique labs and radiologic studies that are shown below as well as any that are in the HPI, and any that are in the expanded problem list below  *Each unique test, order, or document contributes to the combination of 2 or combination of 3 in Category 1 below. I also independently reviewed radiology images for studies I described above in the HPI or studies I have ordered. For this visit I also reviewed old records and prior notes. No results for input(s): WBC, HGB, PLT, NA, K, CL, CO2, BUN, CREA, GLU, INR, APTT, ALT, AML, AML, LCAD, PCO2, PO2, HCO3 in the last 72 hours. Invalid input(s): PTP, TBIL, TBILI, CBIL, SGOT, GPT, AP, LPSE, NH4, TROPT, TROIQ,  PH  No results for input(s): INR, APTT, ALT, AML in the last 72 hours.     Invalid input(s): PTP, TBIL, TBILI, CBIL, SGOT, GPT, AP, LPSE    Most recent blood counts (CBC) review  Lab Results   Component Value Date    WBC 6.5 04/06/2022    HGB 10.3 (L) 04/06/2022 HCT 32.3 (L) 04/06/2022    MCV 93 04/06/2022     04/06/2022     Most recent chemistry (BMP) test review   Lab Results   Component Value Date/Time     04/06/2022 04:31 PM    K 4.8 04/06/2022 04:31 PM     04/06/2022 04:31 PM    CO2 19 04/06/2022 04:31 PM    BUN 30 04/06/2022 04:31 PM    CREATININE 1.38 04/06/2022 04:31 PM    GLUCOSE 119 04/06/2022 04:31 PM    CALCIUM 9.7 04/06/2022 04:31 PM      Most recent Liver enzyme test review  Lab Results   Component Value Date    ALT 13 04/06/2022    AST 26 04/06/2022    ALKPHOS 104 04/06/2022    BILITOT 0.3 04/06/2022     Most recent coagulation (coags) review  @lastinr@  No results found for: INR, APTT, AML, AML, LCAD    Diabetic assessment  Lab Results   Component Value Date    LABA1C 6.9 (H) 04/06/2022     Lab Results   Component Value Date     04/06/2022       Nutritional assessment screen to assess wound healing ability:  Lab Results   Component Value Date    LABALBU 4.0 04/06/2022     No results found for: TP, ALBR, ALB    @lastXray@  @lastCT@      Admission date (for inpatients): 9/29/2022   Day of Surgery  * No procedures listed *    Assessment:      Problem List Items Addressed This Visit    None      [unfilled]    Active Problems:    Dermatitis    Open wound of right foot    BMI 27.0-27.9,adult    Non-pressure chronic ulcer of right lower leg with fat layer exposed ([facility]    Chronic venous htn w ulcer and inflammation of r low extrem ([facility]    Lymphedema    Venous stasis dermatitis of right lower extremity  Resolved Problems:    * No resolved hospital problems.  *      Patient Active Problem List    Diagnosis Date Noted    Non-pressure chronic ulcer of right lower leg with fat layer exposed ([facility] 09/08/2022     Priority: Medium    Open wound of right foot 06/16/2022     Priority: Medium    BMI 27.0-27.9,adult 06/16/2022     Priority: Medium    Dermatitis 06/09/2022     Priority: Medium    Artificial opening status, unspecified ([address] 06/06/2022 Priority: Medium    BMI 30.0-30.9,adult 04/29/2022    Chronic venous htn w ulcer and inflammation of r low extrem ([facility] 04/29/2022    Lymphedema 04/29/2022    Venous stasis dermatitis of right lower extremity 04/29/2022    History of total cystectomy 07/28/2020 2/2008--due to bladder CA        History of bladder cancer 07/28/2020    Retinal edema 02/28/2020    Intermediate stage nonexudative age-related macular degeneration of left eye 02/28/2020    Arthritis 12/13/2018     Lower back        Essential hypertension 12/13/2018    Type 2 diabetes mellitus with skin complication, without long-term current use of insulin ([facility] 12/13/2018           Plan:     Number and Complexity of Problems addressed and   Risks of complications and/or morbidity of management    Treatment Note please see attached Discharge Instructions  Any procedures done today in the wound center (if documented in a separate note) in [facility] are made part of this record by reference  Written patient dismissal instructions given to patient or POA. Right lower extremity venous stasis ulcers  Weekly multilayer compression dressings. We encouraged elevation  We encouraged her to purchase a cast cover from [employer] to keep the dressing dry. Discontinued hydrofera to the right lower extremity venous ulcer on 7/21/2022 and converted to zinc patches under compression   Apply zinc oxide to pretibial shin where there is some contact dermatitis from bandaging    As of 8/18/22 her venous ulcer completely healed. She brought in knee-high compression stockings and was instructed to wear these daily and replace them as needed. She returned on 9/22/2022 with recurrent venous ulceration of her right lower extremity. She will get multilayer compression dressings twice a week over Hydrofera Blue applications     Healed by 9/29/2022  Encourage multilayer compression stockings daily  She will return to see us as needed.       Right lateral forefoot pressure ulcer from bandage new on 6/16/2022  Most recent dressing change done by home health  Apply Hydrofera Blue and wrap in that area. Cushion and pad that area to distribute pressure pressure better  Asked patient to offload more to prevent pressurization of lateral forefoot with ambulation    This healed by 8/18/2022  She will see podiatry next for recommendations on shoe wear and to get her nails trimmed. Venous dermatitis  Weekly Desitin     Lymphedema  Elevation  Salt avoidance  Compression  Weight loss       DM2 with HgbA1c 6.9 on 4/6/22  Encourage adherence to diabetic diet and diabetic medical regimen to help facilitate wound healing  Encourage close follow-up with primary care physician  Encourage HgbA1c q3 months        BMI>30-->29  Encourage weight loss       Treatment significantly limited by social determinants of health  The patient so far has not been able to be compliant with significant reduction in her BMI   which is exacerbating lymphedema and impairing our ability   to treat her dermatitis, venous hypertension, and chronic venous leg ulcers                          Wound Care  No orders of the defined types were placed in this encounter. [unfilled]            Level of MDM (2/3 elements below)  Number and Complexity of Problems Addressed Amount and/or Complexity of Data to be Reviewed and Analyzed  *Each unique test, order, or document contributes to the combination of 2 or combination of 3 in Category 1 below. Risk of Complications and/or Morbidity or Mortality of pt Management     22268  85484 SF Minimal  ?1self-limited or minor problem Minimal or none Minimal risk of morbidity from additional diagnostic testing or Rx   69314  22195 Low Low  ? 2or more self-limited or minor problems;    or  ? 1stable chronic illness;    or  ?1acute, uncomplicated illness or injury   Limited  (Must meet the requirements of at least 1 of the 2 categories)  Category 1: Tests and documents   ? Any combination of 2 from the following:  ?Review of prior external note(s) from each unique source*;  ?review of the result(s) of each unique test*;   ?ordering of each unique test*    or   Category 2: Assessment requiring an independent historian(s)  (For the categories of independent interpretation of tests and discussion of management or test interpretation, see moderate or high) Low risk of morbidity from additional diagnostic testing or treatment     78584  10408 Mod Moderate  ? 1or more chronic illnesses with exacerbation, progression, or side effects of treatment;    or  ?2or more stable chronic illnesses;    or  ?1undiagnosed new problem with uncertain prognosis;    or  ?1acute illness with systemic symptoms;    or  ?1acute complicated injury   Moderate  (Must meet the requirements of at least 1 out of 3 categories)  Category 1: Tests, documents, or independent historian(s)  ? Any combination of 3 from the following:   ?Review of prior external note(s) from each unique source*;  ?Review of the result(s) of each unique test*;  ?Ordering of each unique test*;  ?Assessment requiring an independent historian(s)    or  Category 2: Independent interpretation of tests   ? Independent interpretation of a test performed by another physician/other qualified health care professional (not separately reported);     or  Category 3: Discussion of management or test interpretation  ? Discussion of management or test interpretation with external physician/other qualified health care professional/appropriate source (not separately reported)   Moderate risk of morbidity from additional diagnostic testing or treatment  Examples only:  ?Prescription drug management -Hydrofera   ? Decision regarding minor surgery with identified patient or procedure risk factors  ? Decision regarding elective major surgery without identified patient or procedure risk factors   ? Diagnosis or treatment significantly limited by social determinants of health       65061 04603 High High  ? 1or more chronic illnesses with severe exacerbation, progression, or side effects of treatment;    or  ?1 acute or chronic illness or injury that poses a threat to life or bodily function   Extensive  (Must meet the requirements of at least 2 out of 3 categories)  Category 1: Tests, documents, or independent historian(s)  ? Any combination of 3 from the following:   ?Review of prior external note(s) from each unique source*;  ?Review of the result(s) of each unique test*;   ?Ordering of each unique test*;   ?Assessment requiring an independent historian(s)    or   Category 2: Independent interpretation of tests   ? Independent interpretation of a test performed by another physician/other qualified health care professional (not separately reported);     or  Category 3: Discussion of management or test interpretation  ? Discussion of management or test interpretation with external physician/other qualified health care professional/appropriate source (not separately reported)   High risk of morbidity from additional diagnostic testing or treatment  Examples only:  ?Drug therapy requiring intensive monitoring for toxicity  ? Decision regarding elective major surgery with identified patient or procedure risk factors  ? Decision regarding emergency major surgery  ? Decision regarding hospitalization  ? Decision not to resuscitate or to de-escalate care because of poor prognosis                 I have personally performed a face-to-face diagnostic evaluation and management  service on this patient. I have independently seen the patient. I have independently obtained the above history from the patient/family. I have independently examined the patient with above findings. I have independently reviewed data/labs for this patient and developed the above plan of care (MDM).       Electronically signed by Delbert Kendrick MD on 9/29/2022 at 10:48 AM

## 2022-09-29 NOTE — FLOWSHEET NOTE
09/29/22 1036   Wound 08/29/22 Leg Lower;Medial;Right   Date First Assessed: 08/29/22   Primary Wound Type: Venous Ulcer  Location: Leg  Wound Location Orientation: Lower;Medial;Right   Wound Image    Wound Etiology Venous   Dressing Status Clean;Dry; Intact   Wound Cleansed Wound cleanser   Dressing/Treatment Hydrofera blue  (Ready, Unna Boot)   Wound Length (cm) 0 cm   Wound Width (cm) 0 cm   Wound Depth (cm) 0 cm   Wound Surface Area (cm^2) 0 cm^2   Change in Wound Size % (l*w) 100   Wound Volume (cm^3) 0 cm^3   Wound Healing % 100   Wound Assessment Epithelialization   Drainage Amount None   Megan-wound Assessment Intact     Patient is not on ANTICOAGULANT THERAPY.

## 2022-09-29 NOTE — DISCHARGE INSTRUCTIONS
Discharge Instructions for  Greg Escobar  [address]  [place] [address], [address]  Phone [phone]   Fax [phone]      NAME:  Gabbie Oneal  YOB: 1935  MEDICAL RECORD NUMBER:  [id number]  DATE:  @ED@    Return Appointment:   Discharge with Huma Amaya MD      Instructions: Right Lower Leg Wound Resolved! Moisturize lower legs after showering at night. Double Tubigrip in [address] [facility] today. Compression Stockings 20-30 mmHg from morning to night every day. May also visit local pharmacies such as [facility] ([address] [place] [address] Dr) for [employer]. Search Online for compression stockings:  Knee High Venous Stasis Stockings 20-30 mmHg    Refresh every 6 months. Remember :    Diabetes Management: Diabetic Diet and Diabetes Medication Regimen. Low Salt Diet    Elevate lower legs when at rest.    Discharged from [facility]      Should you experience increased redness, swelling, pain, foul odor, size of wound(s), or have a temperature over 101 degrees please contact the [address] [place] [address] Road at [phone] or if after hours contact your primary care physician or go to the hospital emergency department. PLEASE NOTE: IF YOU ARE UNABLE TO OBTAIN WOUND SUPPLIES, CONTINUE TO USE THE SUPPLIES YOU HAVE AVAILABLE UNTIL YOU ARE ABLE TO REACH US. IT IS MOST IMPORTANT TO KEEP THE WOUND COVERED AT ALL TIMES. Electronically signed Adelaida Sheprad.  Jordon Torres RN on 9/29/2022 at 11:00 AM

## 2022-09-30 ENCOUNTER — HOME CARE VISIT (OUTPATIENT)
Dept: SCHEDULING | Facility: HOME HEALTH | Age: 87
End: 2022-09-30
Payer: MEDICARE

## 2022-09-30 VITALS
TEMPERATURE: 98.4 F | RESPIRATION RATE: 16 BRPM | SYSTOLIC BLOOD PRESSURE: 161 MMHG | OXYGEN SATURATION: 98 % | DIASTOLIC BLOOD PRESSURE: 72 MMHG | HEART RATE: 69 BPM

## 2022-09-30 PROCEDURE — 1090000001 HH PPS REVENUE CREDIT

## 2022-09-30 PROCEDURE — G0299 HHS/HOSPICE OF RN EA 15 MIN: HCPCS

## 2022-09-30 PROCEDURE — 1090000002 HH PPS REVENUE DEBIT

## 2022-09-30 ASSESSMENT — ENCOUNTER SYMPTOMS: STOOL DESCRIPTION: SOFT FORMED

## 2022-10-01 PROCEDURE — 1090000001 HH PPS REVENUE CREDIT

## 2022-10-01 PROCEDURE — 1090000002 HH PPS REVENUE DEBIT

## 2022-10-02 PROCEDURE — 1090000002 HH PPS REVENUE DEBIT

## 2022-10-02 PROCEDURE — 1090000001 HH PPS REVENUE CREDIT

## 2022-11-08 ENCOUNTER — OFFICE VISIT (OUTPATIENT)
Dept: FAMILY MEDICINE CLINIC | Facility: CLINIC | Age: 87
End: 2022-11-08
Payer: MEDICARE

## 2022-11-08 VITALS
OXYGEN SATURATION: 98 % | TEMPERATURE: 98.6 F | HEIGHT: 63 IN | WEIGHT: 161 LBS | SYSTOLIC BLOOD PRESSURE: 159 MMHG | RESPIRATION RATE: 14 BRPM | BODY MASS INDEX: 28.53 KG/M2 | DIASTOLIC BLOOD PRESSURE: 68 MMHG | HEART RATE: 67 BPM

## 2022-11-08 DIAGNOSIS — J30.1 ALLERGIC RHINITIS DUE TO POLLEN, UNSPECIFIED SEASONALITY: Primary | ICD-10-CM

## 2022-11-08 DIAGNOSIS — I10 ESSENTIAL HYPERTENSION: ICD-10-CM

## 2022-11-08 DIAGNOSIS — E11.622 TYPE 2 DIABETES MELLITUS WITH OTHER SKIN ULCER, WITHOUT LONG-TERM CURRENT USE OF INSULIN (HCC): ICD-10-CM

## 2022-11-08 PROCEDURE — 99214 OFFICE O/P EST MOD 30 MIN: CPT | Performed by: PHYSICIAN ASSISTANT

## 2022-11-08 PROCEDURE — 3044F HG A1C LEVEL LT 7.0%: CPT | Performed by: PHYSICIAN ASSISTANT

## 2022-11-08 PROCEDURE — G8427 DOCREV CUR MEDS BY ELIG CLIN: HCPCS | Performed by: PHYSICIAN ASSISTANT

## 2022-11-08 PROCEDURE — 1036F TOBACCO NON-USER: CPT | Performed by: PHYSICIAN ASSISTANT

## 2022-11-08 PROCEDURE — G8417 CALC BMI ABV UP PARAM F/U: HCPCS | Performed by: PHYSICIAN ASSISTANT

## 2022-11-08 PROCEDURE — 1123F ACP DISCUSS/DSCN MKR DOCD: CPT | Performed by: PHYSICIAN ASSISTANT

## 2022-11-08 PROCEDURE — G8484 FLU IMMUNIZE NO ADMIN: HCPCS | Performed by: PHYSICIAN ASSISTANT

## 2022-11-08 PROCEDURE — 1090F PRES/ABSN URINE INCON ASSESS: CPT | Performed by: PHYSICIAN ASSISTANT

## 2022-11-08 RX ORDER — FLUTICASONE PROPIONATE 50 MCG
1 SPRAY, SUSPENSION (ML) NASAL DAILY
Qty: 32 G | Refills: 5 | Status: SHIPPED | OUTPATIENT
Start: 2022-11-08

## 2022-11-08 RX ORDER — LEVOCETIRIZINE DIHYDROCHLORIDE 5 MG/1
5 TABLET, FILM COATED ORAL NIGHTLY
Qty: 90 TABLET | Refills: 3 | Status: SHIPPED | OUTPATIENT
Start: 2022-11-08

## 2022-11-08 NOTE — PATIENT INSTRUCTIONS
Thank you for enrolling in [address] Please follow the instructions below to securely access your online medical record. [employer] allows you to send messages to your doctor, view your test results, renew your prescriptions, schedule appointments, and more. How Do I Sign Up? In your Internet browser, go to [email or web address]  Click on the Sign Up Now link in the Sign In box. You will see the New Member Sign Up page. Enter your [employer] Access Code exactly as it appears below. You will not need to use this code after youve completed the sign-up process. If you do not sign up before the expiration date, you must request a new code. [employer] Access Code: [email or web address]  Expires: 12/22/2022  6:47 AM    Enter your Social Security Number ([id number]) and Date of Birth (mm/dd/yyyy) as indicated and click Submit. You will be taken to the next sign-up page. Create a [employer] ID. This will be your [employer] login ID and cannot be changed, so think of one that is secure and easy to remember. Create a [employer] password. You can change your password at any time. Enter your Password Reset Question and Answer. This can be used at a later time if you forget your password. Enter your e-mail address. You will receive e-mail notification when new information is available in [address] Click Sign Up. You can now view your medical record. Additional Information  If you have questions, please contact your physician practice where you receive care. Remember, [employer] is NOT to be used for urgent needs. For medical emergencies, dial 911.

## 2022-11-08 NOTE — PROGRESS NOTES
Oneyda Vazquez (: 1935) is a 81 y.o. female, an established patient, is here for evaluation of the following chief complaint(s):  Chief Complaint   Patient presents with    Shortness of Breath    Wound Check     She has a h/o chronic venous ulcers which has previously been managed by wound care. Ostomy troubles, some improvement after better skin care of ostomy. She missed her appt in September w/ Dr. Agnieszka Mancini for DM/Chronic care f/u. Will help her to get this rescheduled. ASSESSMENT/PLAN:  Alex Thibodeaux was seen today for shortness of breath and wound check. Diagnoses and all orders for this visit:    Allergic rhinitis due to pollen, unspecified seasonality  -     fluticasone (FLONASE) 50 MCG/ACT nasal spray; 1 spray by Each Nostril route daily  -     levocetirizine (XYZAL) 5 MG tablet; Take 1 tablet by mouth nightly    Essential hypertension  -     Comprehensive Metabolic Panel; Future    Type 2 diabetes mellitus with other skin ulcer, without long-term current use of insulin (HCC)  -     Comprehensive Metabolic Panel; Future  -     Hemoglobin A1C; Future  -     Lipid Panel; Future  -     Microalbumin / Creatinine Urine Ratio; Future    She appears to have some allergies. Will prescribe xyzal and Flonase nasal spray and explained that she would need to give this a few days before she would notice any improvement. Her daughter is also considering having her home tested for mold. BP is slightly elevated at today's visit. She states that it's always higher in the office, but has had home health coming to her home recently for wound care and her BP has always been normal at home. No change to meds at this time. Reassurance provided regarding healing wounds on RLE. She does not need to return to see wound care at this time.       Rev'd note dated 22 w/ Wound Care:    Her first visit to the wound center with us was on 22  She reported progressive ulcerations and blistering of her right lower extremity since approximately June 2021. Nothing in particular made her condition better or worse. She associated the condition with use of a venous thrombosis pump which was placed on her leg in the summer 2021. --Healed by 9/29/2022  Encourage multilayer compression stockings daily  She will return to see us as needed. Follow Up    Next available DM/Routine Care mgt (Dr. Nemo Garcia.)   Please send to lab today for bloodwork    SUBJECTIVE/OBJECTIVE:  Daughter, Neil Oviedo in room with patient    Head stops up and it is hard to breath when laying down at night off and on past several weeks. Wound doctor dismissed her 2 weeks ago,but she is still concerned about place on leg. Vitals:    11/08/22 1101   BP: (!) 159/68   Pulse: 67   Resp: 14   Temp: 98.6 °F (37 °C)   TempSrc: Oral   SpO2: 98%   Weight: 161 lb (73 kg)   Height: 5' 3\" (1.6 m)      Physical Exam  Constitutional:       Appearance: Normal appearance. HENT:      Head: Normocephalic and atraumatic. Right Ear: Tympanic membrane, ear canal and external ear normal. There is no impacted cerumen. Left Ear: Tympanic membrane, ear canal and external ear normal. There is no impacted cerumen. Nose: Rhinorrhea present. No congestion. Comments: Edematous nasal turbinates     Mouth/Throat:      Mouth: Mucous membranes are moist.      Pharynx: No oropharyngeal exudate or posterior oropharyngeal erythema. Eyes:      Extraocular Movements: Extraocular movements intact. Pupils: Pupils are equal, round, and reactive to light. Cardiovascular:      Rate and Rhythm: Normal rate and regular rhythm. Pulmonary:      Effort: Pulmonary effort is normal. No respiratory distress. Breath sounds: Normal breath sounds. No wheezing, rhonchi or rales. Musculoskeletal:      Comments: Pt in a wheelchair   Skin:     Comments: Pt has healing wounds on RLE. Minimal erythema around these wounds.   No warmth present/no sign of active infection. Neurological:      Mental Status: She is alert. Orders Placed This Encounter    Comprehensive Metabolic Panel     Standing Status:   Future     Standing Expiration Date:   2022    Hemoglobin A1C     Standing Status:   Future     Standing Expiration Date:   2023    Lipid Panel     Standing Status:   Future     Standing Expiration Date:   2022    Microalbumin / Creatinine Urine Ratio     Standing Status:   Future     Standing Expiration Date:   2022    fluticasone (FLONASE) 50 MCG/ACT nasal spray     Si spray by Each Nostril route daily     Dispense:  32 g     Refill:  5    levocetirizine (XYZAL) 5 MG tablet     Sig: Take 1 tablet by mouth nightly     Dispense:  90 tablet     Refill:  3             An electronic signature was used to authenticate this note.   -- ASHLEY Ricks

## 2022-11-09 ENCOUNTER — TELEPHONE (OUTPATIENT)
Dept: FAMILY MEDICINE CLINIC | Facility: CLINIC | Age: 87
End: 2022-11-09

## 2022-11-09 DIAGNOSIS — N18.30 BENIGN HYPERTENSION WITH CKD (CHRONIC KIDNEY DISEASE) STAGE III (HCC): Primary | ICD-10-CM

## 2022-11-09 DIAGNOSIS — I12.9 BENIGN HYPERTENSION WITH CKD (CHRONIC KIDNEY DISEASE) STAGE III (HCC): Primary | ICD-10-CM

## 2022-11-09 LAB
ALBUMIN SERPL-MCNC: 3.3 G/DL (ref 3.2–4.6)
ALBUMIN/GLOB SERPL: 1 {RATIO} (ref 0.4–1.6)
ALP SERPL-CCNC: 74 U/L (ref 50–136)
ALT SERPL-CCNC: 24 U/L (ref 12–65)
ANION GAP SERPL CALC-SCNC: 3 MMOL/L (ref 2–11)
AST SERPL-CCNC: 21 U/L (ref 15–37)
BILIRUB SERPL-MCNC: 0.4 MG/DL (ref 0.2–1.1)
BUN SERPL-MCNC: 31 MG/DL (ref 8–23)
CALCIUM SERPL-MCNC: 9.6 MG/DL (ref 8.3–10.4)
CHLORIDE SERPL-SCNC: 110 MMOL/L (ref 101–110)
CHOLEST SERPL-MCNC: 161 MG/DL
CO2 SERPL-SCNC: 28 MMOL/L (ref 21–32)
CREAT SERPL-MCNC: 1.4 MG/DL (ref 0.6–1)
EST. AVERAGE GLUCOSE BLD GHB EST-MCNC: 151 MG/DL
GLOBULIN SER CALC-MCNC: 3.3 G/DL (ref 2.8–4.5)
GLUCOSE SERPL-MCNC: 157 MG/DL (ref 65–100)
HBA1C MFR BLD: 6.9 % (ref 4.8–5.6)
HDLC SERPL-MCNC: 43 MG/DL (ref 40–60)
HDLC SERPL: 3.7 {RATIO}
LDLC SERPL CALC-MCNC: 75.2 MG/DL
POTASSIUM SERPL-SCNC: 4.5 MMOL/L (ref 3.5–5.1)
PROT SERPL-MCNC: 6.6 G/DL (ref 6.3–8.2)
SODIUM SERPL-SCNC: 141 MMOL/L (ref 133–143)
TRIGL SERPL-MCNC: 214 MG/DL (ref 35–150)
VLDLC SERPL CALC-MCNC: 42.8 MG/DL (ref 6–23)

## 2022-11-09 NOTE — TELEPHONE ENCOUNTER
----- Message from Gui [place] sent at 11/9/2022  7:58 AM EST -----  Please let her know that Her diabetic control is stable and well controlled. She had chronic kidney disease (not new)--please ask her if she is seeing a Nephrologist for this. If not, I would like to place a referral for her.

## 2022-12-12 ENCOUNTER — OFFICE VISIT (OUTPATIENT)
Dept: FAMILY MEDICINE CLINIC | Facility: CLINIC | Age: 87
End: 2022-12-12
Payer: MEDICARE

## 2022-12-12 VITALS
SYSTOLIC BLOOD PRESSURE: 140 MMHG | OXYGEN SATURATION: 98 % | RESPIRATION RATE: 18 BRPM | HEIGHT: 63 IN | TEMPERATURE: 98.5 F | WEIGHT: 150 LBS | DIASTOLIC BLOOD PRESSURE: 84 MMHG | BODY MASS INDEX: 26.58 KG/M2 | HEART RATE: 69 BPM

## 2022-12-12 DIAGNOSIS — N18.30 BENIGN HYPERTENSION WITH CKD (CHRONIC KIDNEY DISEASE) STAGE III (HCC): Primary | ICD-10-CM

## 2022-12-12 DIAGNOSIS — I87.2 VENOUS STASIS DERMATITIS OF RIGHT LOWER EXTREMITY: ICD-10-CM

## 2022-12-12 DIAGNOSIS — E11.319 TYPE 2 DIABETES MELLITUS WITH RETINOPATHY WITHOUT MACULAR EDEMA, WITHOUT LONG-TERM CURRENT USE OF INSULIN, UNSPECIFIED LATERALITY, UNSPECIFIED RETINOPATHY SEVERITY (HCC): ICD-10-CM

## 2022-12-12 DIAGNOSIS — I12.9 BENIGN HYPERTENSION WITH CKD (CHRONIC KIDNEY DISEASE) STAGE III (HCC): Primary | ICD-10-CM

## 2022-12-12 DIAGNOSIS — Z13.220 SCREENING CHOLESTEROL LEVEL: ICD-10-CM

## 2022-12-12 PROCEDURE — 1123F ACP DISCUSS/DSCN MKR DOCD: CPT | Performed by: FAMILY MEDICINE

## 2022-12-12 PROCEDURE — G8484 FLU IMMUNIZE NO ADMIN: HCPCS | Performed by: FAMILY MEDICINE

## 2022-12-12 PROCEDURE — 1090F PRES/ABSN URINE INCON ASSESS: CPT | Performed by: FAMILY MEDICINE

## 2022-12-12 PROCEDURE — 3044F HG A1C LEVEL LT 7.0%: CPT | Performed by: FAMILY MEDICINE

## 2022-12-12 PROCEDURE — 1036F TOBACCO NON-USER: CPT | Performed by: FAMILY MEDICINE

## 2022-12-12 PROCEDURE — 99214 OFFICE O/P EST MOD 30 MIN: CPT | Performed by: FAMILY MEDICINE

## 2022-12-12 PROCEDURE — G8417 CALC BMI ABV UP PARAM F/U: HCPCS | Performed by: FAMILY MEDICINE

## 2022-12-12 PROCEDURE — G8427 DOCREV CUR MEDS BY ELIG CLIN: HCPCS | Performed by: FAMILY MEDICINE

## 2022-12-12 RX ORDER — LOSARTAN POTASSIUM 50 MG/1
50 TABLET ORAL DAILY
Qty: 90 TABLET | Refills: 3 | Status: SHIPPED | OUTPATIENT
Start: 2022-12-12

## 2022-12-12 RX ORDER — ATENOLOL 50 MG/1
50 TABLET ORAL DAILY
Qty: 90 TABLET | Refills: 3 | Status: SHIPPED | OUTPATIENT
Start: 2022-12-12

## 2022-12-12 ASSESSMENT — PATIENT HEALTH QUESTIONNAIRE - PHQ9
SUM OF ALL RESPONSES TO PHQ QUESTIONS 1-9: 0
SUM OF ALL RESPONSES TO PHQ QUESTIONS 1-9: 0
1. LITTLE INTEREST OR PLEASURE IN DOING THINGS: 0
SUM OF ALL RESPONSES TO PHQ QUESTIONS 1-9: 0
SUM OF ALL RESPONSES TO PHQ QUESTIONS 1-9: 0
2. FEELING DOWN, DEPRESSED OR HOPELESS: 0
SUM OF ALL RESPONSES TO PHQ9 QUESTIONS 1 & 2: 0

## 2022-12-12 ASSESSMENT — ENCOUNTER SYMPTOMS
SHORTNESS OF BREATH: 0
COUGH: 0

## 2022-12-12 NOTE — PROGRESS NOTES
Cassandra Ba (: 1935) is a 81 y.o. female, established patient, here for evaluation of the following chief complaint(s): Other (Blood work and to look at leg)       ASSESSMENT/PLAN:  1. Benign hypertension with CKD (chronic kidney disease) stage III (HCC)  -     atenolol (TENORMIN) 50 MG tablet; Take 1 tablet by mouth daily, Disp-90 tablet, R-3Normal  -     losartan (COZAAR) 50 MG tablet; Take 1 tablet by mouth daily, Disp-90 tablet, R-3Normal  -     CBC with Auto Differential; Future  -     TSH; Future  -     Comprehensive Metabolic Panel; Future  2. Venous stasis dermatitis of right lower extremity  3. 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 Screening cholesterol level  -     Lipid Panel; Future    Blood pressure at goal today for age, will continue current regiment. Reviewed recent blood work. It is stable. A1c is also stable at 6.9, continue metformin. We will repeat prior to next appointment. Evaluated her right lower extremity, swelling is minimal, there is a small ulcer that does not appear to be infected. Advised her to monitor closely. Continue compression stockings, elevation. Discussed the Lasix and how she could try half a dose to see if the swelling returns. She will monitor and keep us posted. Return in about 6 months (around 2023) for [address] with fasting labs 1 week prior. SUBJECTIVE/OBJECTIVE:  HPI    81-year-old female with a history of hypertension, type 2 diabetes, lymphedema who presents for follow-up. She has had lower extremity edema chronically for some time, she is also had issues with skin breakdown and venous stasis dermatitis. She has overall been doing well but noticed a small wound that popped up recently.   She says it does not look infected but she would like me to take a look today. Otherwise her blood pressure and diabetes seem to be doing fine, she is taking her medications. She needs refills on her atenolol and Cozaar today. She has had recent blood work done. She is on Lasix daily is wondering if she can go off of this. She does wear compression stockings regularly. Review of Systems   Constitutional:  Negative for activity change, appetite change, fever and unexpected weight change. Respiratory:  Negative for cough and shortness of breath. Cardiovascular:  Negative for chest pain and palpitations. Skin:  Negative for rash. Neurological:  Negative for dizziness and headaches. Psychiatric/Behavioral:  Negative for sleep disturbance. Vitals:    12/12/22 1306   BP: (!) 140/84   Pulse: 69   Resp: 18   Temp: 98.5 °F (36.9 °C)   SpO2: 98%       Physical Exam  Vitals reviewed. Constitutional:       General: She is not in acute distress. Appearance: Normal appearance. She is not ill-appearing or toxic-appearing. HENT:      Head: Normocephalic and atraumatic. Eyes:      Conjunctiva/sclera: Conjunctivae normal.   Cardiovascular:      Rate and Rhythm: Normal rate and regular rhythm. Heart sounds: Normal heart sounds. No murmur heard. No friction rub. No gallop. Pulmonary:      Effort: Pulmonary effort is normal. No respiratory distress. Breath sounds: Normal breath sounds. No wheezing, rhonchi or rales. Musculoskeletal:         General: No swelling. Normal range of motion. Skin:     General: Skin is warm. Findings: No rash. Neurological:      Mental Status: She is alert. Mental status is at baseline. Psychiatric:         Mood and Affect: Mood normal.          On this date, I spent 35 minutes reviewing previous notes, test results and face to face with the patient discussing the diagnosis and importance of compliance with the treatment plan as well as documenting on the day of the visit.             An electronic signature was used to authenticate this note.   -- Josue Washington MD

## 2023-01-31 ENCOUNTER — TELEPHONE (OUTPATIENT)
Dept: FAMILY MEDICINE CLINIC | Facility: CLINIC | Age: 88
End: 2023-01-31

## 2023-01-31 DIAGNOSIS — I12.9 BENIGN HYPERTENSION WITH CKD (CHRONIC KIDNEY DISEASE) STAGE III (HCC): Primary | ICD-10-CM

## 2023-01-31 DIAGNOSIS — N18.30 BENIGN HYPERTENSION WITH CKD (CHRONIC KIDNEY DISEASE) STAGE III (HCC): Primary | ICD-10-CM

## 2023-01-31 NOTE — TELEPHONE ENCOUNTER
Patient daughter called. The Pt needs a refill of Atenolol. The patient has been taking two pills daily. She said that's what she used to do. The patients daughter wanted to make sure that the pt was supposed to only be on one, since her past dosage was different. Preferred pharmacy is [facility].   Reina's Phone number is [phone]

## 2023-01-31 NOTE — TELEPHONE ENCOUNTER
I reviewed several prior visit notes and it appears that she is only supposed to be taking Atenolol 50 mg ONCE DAILY. This rx was sent to [facility] on 12/12/22 for a full year's supply.

## 2023-02-02 ENCOUNTER — TELEPHONE (OUTPATIENT)
Dept: FAMILY MEDICINE CLINIC | Facility: CLINIC | Age: 88
End: 2023-02-02

## 2023-02-02 DIAGNOSIS — N18.30 BENIGN HYPERTENSION WITH CKD (CHRONIC KIDNEY DISEASE) STAGE III (HCC): ICD-10-CM

## 2023-02-02 DIAGNOSIS — I12.9 BENIGN HYPERTENSION WITH CKD (CHRONIC KIDNEY DISEASE) STAGE III (HCC): ICD-10-CM

## 2023-02-02 RX ORDER — ATENOLOL 100 MG/1
100 TABLET ORAL DAILY
Qty: 90 TABLET | Refills: 3 | Status: SHIPPED | OUTPATIENT
Start: 2023-02-02 | End: 2023-02-03

## 2023-02-02 NOTE — TELEPHONE ENCOUNTER
Patients daughter called again. She wants to know why her moms dosage ws chagned. She states that they were unaware of the change from 100 mg to 50 mg daily. Due to being unaware the Pt has been taking two 50 mg tablets daily,so she needs a refill earlier than she should, which her pharmacy wont fill. The patient is now completely put of Atenolol. Preferred pharmacy is [facility].

## 2023-02-02 NOTE — TELEPHONE ENCOUNTER
I spoke with patient's daughter who stated that if her mom was only supposed to be taking one 50 mg atenolol tablet that someone should have notified the patient. She has been taking two 50 mg tablets for quite a while.

## 2023-02-03 RX ORDER — ATENOLOL 50 MG/1
50 TABLET ORAL 2 TIMES DAILY
Qty: 180 TABLET | Refills: 3 | Status: SHIPPED | OUTPATIENT
Start: 2023-02-03

## 2023-02-03 NOTE — TELEPHONE ENCOUNTER
We apologize. It does appear that she is supposed to be taking 50mg BID. Will send in a corrected prescription.

## 2023-02-06 ENCOUNTER — TELEPHONE (OUTPATIENT)
Dept: FAMILY MEDICINE CLINIC | Facility: CLINIC | Age: 88
End: 2023-02-06

## 2023-02-06 NOTE — TELEPHONE ENCOUNTER
Patients daughter called again. She wants to know why her moms dosage ws chagned. She states that they were unaware of the change from 100 mg to 50 mg daily. Due to being unaware the Pt has been taking two 50 mg tablets daily,so she needs a refill earlier than she should, which her pharmacy wont fill.     The dosage is incorrect

## 2023-11-06 ENCOUNTER — HOSPITAL ENCOUNTER (OUTPATIENT)
Dept: WOUND CARE | Age: 88
Discharge: HOME | End: 2023-11-06
Payer: MEDICARE

## 2023-11-06 VITALS
DIASTOLIC BLOOD PRESSURE: 95 MMHG | HEART RATE: 70 BPM | BODY MASS INDEX: 26.58 KG/M2 | SYSTOLIC BLOOD PRESSURE: 137 MMHG | HEIGHT: 63 IN | WEIGHT: 150 LBS

## 2023-11-06 PROCEDURE — 99212 OFFICE O/P EST SF 10 MIN: CPT

## 2023-11-14 ENCOUNTER — HOSPITAL ENCOUNTER (OUTPATIENT)
Dept: WOUND CARE | Age: 88
Discharge: HOME | End: 2023-11-14
Payer: MEDICARE

## 2023-11-14 VITALS
HEIGHT: 63 IN | SYSTOLIC BLOOD PRESSURE: 125 MMHG | TEMPERATURE: 97.9 F | DIASTOLIC BLOOD PRESSURE: 104 MMHG | BODY MASS INDEX: 26.58 KG/M2 | HEART RATE: 100 BPM | WEIGHT: 150 LBS

## 2023-11-14 PROCEDURE — 99212 OFFICE O/P EST SF 10 MIN: CPT

## 2023-11-14 NOTE — WOUND CARE
Clinical Level of Care Assessment    Outpatient Ostomy Care      NAME:  Yamile Lewis  YOB: 1935  MEDICAL RECORD NUMBER:  [id number]   DATE:  11/14/2023      Patient Heidy Pruett Assessment- Document in Flowsheet I&O   Points   Review of chart []   0   Assess Complete Ostomy tab in Navigator for assessment of; stoma status, peristomal skin, presence of hernia/stool consistency/diet/related medications   Simple adjustments to pouch size/pouch system, new stoma pattern, accessory addition/deletion. []   1   Assess Complete Ostomy tab in Navigator for assessment of; stoma status, peristomal skin, presence of hernia/stool consistency/diet/related medications   Moderate adjustments to pouch size/pouch system, new stoma pattern, accessory addition/deletion. Observe patient/caregiver with hands-on care. 1-2 adjustments to pouch size/system/skin care/accessory addition or deletion. [x]   2   Assess Complete Ostomy tab in Navigator for assessment of; stoma status, peristomal skin, presence of hernia/stool consistency/diet/related medications   Complex adjustments to pouch size/pouch system, new stoma pattern, accessory addition/deletion. 3 or more complex adjustments to pouch size/system/skin care/accessory addition or deletion. Observe patient/caregiver with hands-on care. Assess patient/patient abdomen for optimal pre-marked stoma site. Assess patient abdomen for type of hernia belt/accessory needed. []   3         Ambulation Status Documented in CN Clinical Note  Status Definition Points   Independent Independently able to ambulate. Fully able (without any assistance) to get on/off exam table/chair. []   0   Minimal Physical Assistance Requires physical assistance of one person to ambulate and/or position patient to be examined. Includes necessary physical assistance to position lower extremities on/off stool.    [x]   1   Moderate Physical Assistance Requires at least one staff member to

## 2023-11-14 NOTE — WOUND CARE
[facility] Ostomy Referral Follow-up Note        NAME:  [address] RECORD NUMBER:  [id number]  AGE: 81 y.o. GENDER:  female  :  1935  TODAY'S DATE:  2022     Subjective: Diana Giraldo is a 81 y.o. female who presents today for Ostomy evaluation. Pt has history of bladder cancer and had bladder removed and urostomy placed about 15 years ago. Since then had no issues with pouching and was able to get a wear time of about 4-5 days without issues. Then about 1 year ago patient states they discontinued the type of pouch she was using and has had issues with pouch staying on, on and off for about a year. 22:  Pt presents today with issues keeping pouch on for more than 1 day and peristomal irration. Pt's daughter has been crusting with stoma powder and non sting skin prep pads and using pre-cut rustam convex 2 piece urostomy pouches. Removed old pouch and noted partial thickness skin loss on peristomal skin from 9 o'clock to 2 o'clock. Recommend crusting no more than 3 layers and if urine leaks during crusting do not re-crust area just re-apply skin prep to that area and let dry prior to applying the pouch. Recommend to continue use of scooter rings to give added convexity to pouch and achieve better seal. Daughter verbalized understanding. Plan for follow up next week to eval plan and peristomal skin at that time. 22  Pt presents today stating that she has been able to get 3 days wear time with pouches now and has continued crusting and using scooter ring to add convexity. Removed pouch today noted peristomal irritation now only from 8 o'clock to 3 o'clock and more superficial open areas than previously noted. Cleansed frandy stomal skin with warm water and washcloth and applied 2 layers of crusting, then applied new pouch, held in place for 5 mins. Recommend to continue crusting and scooter ring until peristomal skin is completely healed.  Recommend to follow up in 2-3 weeks to re-eval

## 2023-11-29 ENCOUNTER — HOSPITAL ENCOUNTER (OUTPATIENT)
Dept: WOUND CARE | Age: 88
Discharge: HOME | End: 2023-11-29
Payer: MEDICARE

## 2023-11-29 VITALS
WEIGHT: 150 LBS | HEART RATE: 62 BPM | SYSTOLIC BLOOD PRESSURE: 165 MMHG | BODY MASS INDEX: 26.58 KG/M2 | DIASTOLIC BLOOD PRESSURE: 77 MMHG | HEIGHT: 63 IN

## 2023-11-29 PROCEDURE — 99212 OFFICE O/P EST SF 10 MIN: CPT

## 2023-12-13 ENCOUNTER — HOSPITAL ENCOUNTER (OUTPATIENT)
Dept: WOUND CARE | Age: 88
Discharge: HOME | End: 2023-12-13
Payer: MEDICARE

## 2023-12-13 VITALS — BODY MASS INDEX: 26.58 KG/M2 | HEIGHT: 63 IN | WEIGHT: 150 LBS

## 2023-12-13 PROCEDURE — 99212 OFFICE O/P EST SF 10 MIN: CPT

## 2023-12-13 NOTE — WOUND CARE
Clinical Level of Care Assessment    Outpatient Ostomy Care      NAME:  William Rutledge  YOB: 1935  MEDICAL RECORD NUMBER:  [id number]   DATE:  12/13/2023      Patient Francisco Javier Salgado Assessment- Document in Flowsheet I&O   Points   Review of chart []   0   Assess Complete Ostomy tab in Navigator for assessment of; stoma status, peristomal skin, presence of hernia/stool consistency/diet/related medications   Simple adjustments to pouch size/pouch system, new stoma pattern, accessory addition/deletion. []   1   Assess Complete Ostomy tab in Navigator for assessment of; stoma status, peristomal skin, presence of hernia/stool consistency/diet/related medications   Moderate adjustments to pouch size/pouch system, new stoma pattern, accessory addition/deletion. Observe patient/caregiver with hands-on care. 1-2 adjustments to pouch size/system/skin care/accessory addition or deletion. [x]   2   Assess Complete Ostomy tab in Navigator for assessment of; stoma status, peristomal skin, presence of hernia/stool consistency/diet/related medications   Complex adjustments to pouch size/pouch system, new stoma pattern, accessory addition/deletion. 3 or more complex adjustments to pouch size/system/skin care/accessory addition or deletion. Observe patient/caregiver with hands-on care. Assess patient/patient abdomen for optimal pre-marked stoma site. Assess patient abdomen for type of hernia belt/accessory needed. []   3         Ambulation Status Documented in CN Clinical Note  Status Definition Points   Independent Independently able to ambulate. Fully able (without any assistance) to get on/off exam table/chair. []   0   Minimal Physical Assistance Requires physical assistance of one person to ambulate and/or position patient to be examined. Includes necessary physical assistance to position lower extremities on/off stool.    [x]   1   Moderate Physical Assistance Requires at least one staff member to

## 2023-12-13 NOTE — WOUND CARE
[facility] Ostomy Referral Follow-up Note        NAME:  [address]deep [address] RECORD NUMBER:  [id number]  AGE: 81 y.o. GENDER:  female  :  1935  TODAY'S DATE:  2022     Subjective: Jayla Garcia is a 81 y.o. female who presents today for Ostomy evaluation. Pt has history of bladder cancer and had bladder removed and urostomy placed about 15 years ago. Since then had no issues with pouching and was able to get a wear time of about 4-5 days without issues. Then about 1 year ago patient states they discontinued the type of pouch she was using and has had issues with pouch staying on, on and off for about a year. 22:  Pt presents today with issues keeping pouch on for more than 1 day and peristomal irration. Pt's daughter has been crusting with stoma powder and non sting skin prep pads and using pre-cut rustam convex 2 piece urostomy pouches. Removed old pouch and noted partial thickness skin loss on peristomal skin from 9 o'clock to 2 o'clock. Recommend crusting no more than 3 layers and if urine leaks during crusting do not re-crust area just re-apply skin prep to that area and let dry prior to applying the pouch. Recommend to continue use of scooter rings to give added convexity to pouch and achieve better seal. Daughter verbalized understanding. Plan for follow up next week to eval plan and peristomal skin at that time. 22  Pt presents today stating that she has been able to get 3 days wear time with pouches now and has continued crusting and using scooter ring to add convexity. Removed pouch today noted peristomal irritation now only from 8 o'clock to 3 o'clock and more superficial open areas than previously noted. Cleansed frandy stomal skin with warm water and washcloth and applied 2 layers of crusting, then applied new pouch, held in place for 5 mins. Recommend to continue crusting and scooter ring until peristomal skin is completely healed.  Recommend to follow up in 2-3 weeks to re-eval

## 2023-12-15 DIAGNOSIS — N18.30 BENIGN HYPERTENSION WITH CKD (CHRONIC KIDNEY DISEASE) STAGE III (HCC): ICD-10-CM

## 2023-12-15 DIAGNOSIS — I12.9 BENIGN HYPERTENSION WITH CKD (CHRONIC KIDNEY DISEASE) STAGE III (HCC): ICD-10-CM

## 2023-12-15 RX ORDER — LOSARTAN POTASSIUM 50 MG/1
50 TABLET ORAL DAILY
Qty: 90 TABLET | Refills: 3 | Status: SHIPPED | OUTPATIENT
Start: 2023-12-15

## 2023-12-15 NOTE — TELEPHONE ENCOUNTER
Pt is requesting a rrefill for losartan 50 mg    Pharmacy [facility] [place] [id number] [facility]

## 2023-12-27 ENCOUNTER — HOSPITAL ENCOUNTER (OUTPATIENT)
Dept: WOUND CARE | Age: 88
Discharge: HOME | End: 2023-12-27
Payer: MEDICARE

## 2023-12-27 VITALS
HEIGHT: 63 IN | HEART RATE: 62 BPM | DIASTOLIC BLOOD PRESSURE: 85 MMHG | BODY MASS INDEX: 26.58 KG/M2 | WEIGHT: 150 LBS | SYSTOLIC BLOOD PRESSURE: 148 MMHG

## 2023-12-27 PROCEDURE — 99212 OFFICE O/P EST SF 10 MIN: CPT

## 2023-12-27 NOTE — WOUND CARE
[facility] Ostomy Referral Follow-up Note        NAME:  [address]deep [address] RECORD NUMBER:  [id number]  AGE: 81 y.o. GENDER:  female  :  1935  TODAY'S DATE:  2022     Subjective: Christine Varela is a 81 y.o. female who presents today for Ostomy evaluation. Pt has history of bladder cancer and had bladder removed and urostomy placed about 15 years ago. Since then had no issues with pouching and was able to get a wear time of about 4-5 days without issues. Then about 1 year ago patient states they discontinued the type of pouch she was using and has had issues with pouch staying on, on and off for about a year. 22:  Pt presents today with issues keeping pouch on for more than 1 day and peristomal irration. Pt's daughter has been crusting with stoma powder and non sting skin prep pads and using pre-cut rsutam convex 2 piece urostomy pouches. Removed old pouch and noted partial thickness skin loss on peristomal skin from 9 o'clock to 2 o'clock. Recommend crusting no more than 3 layers and if urine leaks during crusting do not re-crust area just re-apply skin prep to that area and let dry prior to applying the pouch. Recommend to continue use of scooter rings to give added convexity to pouch and achieve better seal. Daughter verbalized understanding. Plan for follow up next week to eval plan and peristomal skin at that time. 22  Pt presents today stating that she has been able to get 3 days wear time with pouches now and has continued crusting and using scooter ring to add convexity. Removed pouch today noted peristomal irritation now only from 8 o'clock to 3 o'clock and more superficial open areas than previously noted. Cleansed frandy stomal skin with warm water and washcloth and applied 2 layers of crusting, then applied new pouch, held in place for 5 mins. Recommend to continue crusting and scooter ring until peristomal skin is completely healed.  Recommend to follow up in 2-3 weeks to re-eval
physically assist patient in ambulating into treatment room, and/or on off chair/bed. Requires assistance to bathroom. []   2   Full Assistance Requires assistance of at least two staff members to transfer patient into treatment room and/or on/off bed/chair. \"Total Transfer\". Unable to use bathroom requires bedside commode and/or bedpan []   3       Teaching Effort Documented in [facility] Clinical Note  Effort Definition Points   No Teaching  []   0   General Initial/Simple lesson:  Assess readiness to learn, assess patient learning style to determine educational flow/special needs for learning. Teaching related to 1-3 topics  Documentation in [facility] completed. [x]   1   Intermediate Assess readiness to learn, assess patient learning style to determine educational flow/special needs for learning. Teaching related to 3-4 topics. Hernia belt application and care considerations  Documentation in [facility] completed. []   2   Complex Assess readiness to learn, assess patient learning style to determine educational flow/special needs for learning. Teaching of greater than 5 additional topics   Pre-operative ostomy education with review of written resources for patient/family/caregiver as needed. Demonstration/return demonstration of ostomy irrigation  Documentation in [facility] completed. []   3     Patient Assessment and Planning in [facility] Clinical Note   Planning Definition Points   Simple Simple pouch change procedure completed and reviewed with patient/family/caregiver   Documentation in [facility] completed. []   1   Intermediate Moderate level of follow-up needs:   Pouch change/discharge procedure revised and reviewed with patient/caregiver. Communications with outside resources; i.e. Telephone calls to Surgeon/ PCP, family/caregiver, home health, ECF. Documentation in [facility] completed.      [x]   2   Complex Complex level of instructions/changes:   Family/Caregiver learning/demonstration/return

## 2024-01-02 ENCOUNTER — TELEPHONE (OUTPATIENT)
Dept: FAMILY MEDICINE CLINIC | Facility: CLINIC | Age: 89
End: 2024-01-02

## 2024-01-02 NOTE — TELEPHONE ENCOUNTER
Discharged at [facility] on 24.       ER visit on  for UTI     Had surgery on 23 Cellulitis      Care Transitions Initial Follow Up Call     Outreach made within 2 business days of discharge:  yes     Patient: Hannah Bauer       Patient : 1935   MRN: [id number]       Reason for Admission: Cirrhosis of the liver, sepsis and a wound issues Discharge Date:                         Spoke with: Jayla Mccarthy (Daughter on PRAVIN)     Discharge department/facility:  [facility] Interactive Patient Contact:  Was patient able to fill all prescriptions: yes   Was patient instructed to bring all medications to the follow-up visit: yes  Is patient taking all medications as directed in the discharge summary? yes  Does patient understand their discharge instructions: yes  Does patient have questions or concerns that need addressed prior to 7-14 day follow up office visit: patient is currently working with [facility]. Hospital doctors wanted pt to get home chava.  Patient has a hx urostomy.   Wanting a referral to urology and gastroenterology  did imaging of liver that showed cirrhosis.  This issues needs more clarification  Pt had sepsis in the hospital.  Preferred that she does not sit in a waiting room      Scheduled appointment with PCP within 7-14 days

## 2024-01-02 NOTE — TELEPHONE ENCOUNTER
----- Message from Seralizz Nicholson sent at 1/2/2024 11:14 AM EST -----  Subject: Hospital Follow Up    QUESTIONS  What hospital was the Patient Discharged from? [facility]  Date of Discharge? 2024-01-01  Discharge Location? Home  Reason for hospitalization as patient stated? lots of issues   What question does the patient have, if applicable? is there days that   office just does well visits for ppl not sick to avoid exposure to flue   etc due to moms immune system.  ---------------------------------------------------------------------------  --------------  CALL BACK INFO  What is the best way for the office to contact you? OK to leave message on   voicemail  Preferred Call Back Phone Number? [phone]  ---------------------------------------------------------------------------  --------------  SCRIPT ANSWERS  Relationship to Patient? Other/Third Party  Representative Name? daughter- judith  Additional information verified (besides Name and Date of Birth)? Phone   Number

## 2024-01-02 NOTE — TELEPHONE ENCOUNTER
Discharged at [facility] on 24.      ER visit on  for UTI    Had surgery on 23 Cellulitis     Care Transitions Initial Follow Up Call    Outreach made within 2 business days of discharge: yes    Patient: Hannah Bauer Patient : 1935   MRN: [id number]  Reason for Admission: No discharge information exists for this patient.  Discharge Date:         Spoke with: Jayla Mccarthy (Daughter on PRAVIN)    Discharge department/facility:     [facility] Interactive Patient Contact:  Was patient able to fill all prescriptions: yes   Was patient instructed to bring all medications to the follow-up visit: yes  Is patient taking all medications as directed in the discharge summary? yes  Does patient understand their discharge instructions: yes  Does patient have questions or concerns that need addressed prior to 7-14 day follow up office visit: patient is currently working with [facility]. Hospital doctors wanted pt to get home chava.  Patient has a hx urostomy.   Wanting a referral to urology and gastroenterology  did imaging of liver that showed cirrhosis.  This issues needs more clarification  Pt had sepsis in the hospital.  Preferred that she does not sit in a waiting room     Scheduled appointment with PCP within 7-14 days    Follow Up  Future Appointments   Date Time Provider Department Center   2024 10:50 AM Gia Reyes RN [facility]   3/7/2024 10:00 AM Reginald Yanez MD [facility] CHARISSE Beauchamp MA

## 2024-01-03 ENCOUNTER — TELEPHONE (OUTPATIENT)
Dept: FAMILY MEDICINE CLINIC | Facility: CLINIC | Age: 89
End: 2024-01-03

## 2024-01-03 NOTE — TELEPHONE ENCOUNTER
[facility] called for the patient.  She states that they need a verbal order for a nurse visit because of pressure sore on her sacrum.      They also need orders for PT for twice a week for 3 weeks, and once a week for 5 weeks.     [phone]

## 2024-01-12 ENCOUNTER — OFFICE VISIT (OUTPATIENT)
Dept: FAMILY MEDICINE CLINIC | Facility: CLINIC | Age: 89
End: 2024-01-12

## 2024-01-12 VITALS
BODY MASS INDEX: 19.91 KG/M2 | DIASTOLIC BLOOD PRESSURE: 68 MMHG | HEART RATE: 65 BPM | OXYGEN SATURATION: 100 % | SYSTOLIC BLOOD PRESSURE: 172 MMHG | WEIGHT: 112.4 LBS | HEIGHT: 63 IN | TEMPERATURE: 98.2 F

## 2024-01-12 DIAGNOSIS — Z09 HOSPITAL DISCHARGE FOLLOW-UP: Primary | ICD-10-CM

## 2024-01-12 RX ORDER — FUROSEMIDE 20 MG/1
20 TABLET ORAL DAILY
Qty: 30 TABLET | Refills: 1 | Status: SHIPPED | OUTPATIENT
Start: 2024-01-12

## 2024-01-12 SDOH — ECONOMIC STABILITY: FOOD INSECURITY: WITHIN THE PAST 12 MONTHS, YOU WORRIED THAT YOUR FOOD WOULD RUN OUT BEFORE YOU GOT MONEY TO BUY MORE.: NEVER TRUE

## 2024-01-12 SDOH — ECONOMIC STABILITY: FOOD INSECURITY: WITHIN THE PAST 12 MONTHS, THE FOOD YOU BOUGHT JUST DIDN'T LAST AND YOU DIDN'T HAVE MONEY TO GET MORE.: NEVER TRUE

## 2024-01-12 SDOH — ECONOMIC STABILITY: INCOME INSECURITY: HOW HARD IS IT FOR YOU TO PAY FOR THE VERY BASICS LIKE FOOD, HOUSING, MEDICAL CARE, AND HEATING?: NOT HARD AT ALL

## 2024-01-12 SDOH — ECONOMIC STABILITY: HOUSING INSECURITY
IN THE LAST 12 MONTHS, WAS THERE A TIME WHEN YOU DID NOT HAVE A STEADY PLACE TO SLEEP OR SLEPT IN A SHELTER (INCLUDING NOW)?: NO

## 2024-01-12 ASSESSMENT — PATIENT HEALTH QUESTIONNAIRE - PHQ9
SUM OF ALL RESPONSES TO PHQ QUESTIONS 1-9: 0
SUM OF ALL RESPONSES TO PHQ QUESTIONS 1-9: 0
SUM OF ALL RESPONSES TO PHQ9 QUESTIONS 1 & 2: 0
1. LITTLE INTEREST OR PLEASURE IN DOING THINGS: 0
SUM OF ALL RESPONSES TO PHQ QUESTIONS 1-9: 0
SUM OF ALL RESPONSES TO PHQ QUESTIONS 1-9: 0
2. FEELING DOWN, DEPRESSED OR HOPELESS: 0

## 2024-01-12 ASSESSMENT — ENCOUNTER SYMPTOMS
GASTROINTESTINAL NEGATIVE: 1
RESPIRATORY NEGATIVE: 1
ALLERGIC/IMMUNOLOGIC NEGATIVE: 1
EYES NEGATIVE: 1
BACK PAIN: 1

## 2024-01-12 NOTE — PROGRESS NOTES
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 ([facility])    Lymphedema    Venous stasis dermatitis of right lower extremity    Artificial opening status, unspecified ([facility])    Dermatitis    Open wound of right foot    BMI 27.0-27.9,adult    Non-pressure chronic ulcer of right lower leg with fat layer exposed ([facility])       Medication list at time of discharge reviewed: Yes    Medications marked \"taking\" at this time  Outpatient Medications Marked as Taking for the 1/12/24 encounter (Office Visit) with Светлана Melchor MD   Medication Sig Dispense Refill    furosemide (LASIX) 20 MG tablet Take 1 tablet by mouth daily 30 tablet 1    losartan (COZAAR) 50 MG tablet Take 1 tablet by mouth daily 90 tablet 3    atenolol (TENORMIN) 50 MG tablet Take 1 tablet by mouth 2 times daily 180 tablet 3    fluticasone (FLONASE) 50 MCG/ACT nasal spray 1 spray by Each Nostril route daily 32 g 5    Saccharomyces boulardii (DIGESTIVE PROBIOTIC PO) Take 1 tablet by mouth daily      Multiple Vitamin (MULTIVITAMIN PO) Take 1 tablet by mouth daily      metFORMIN (GLUCOPHAGE-XR) 500 mg extended release tablet Take 1 tablet by mouth 2 times daily 180 tablet 3    acetaminophen (TYLENOL) 500 MG tablet Take 1 tablet by mouth every 6 hours as needed for Pain      Calcium Carb-Cholecalciferol 600-800 MG-UNIT CHEW Take 1 tablet by mouth daily          Medications patient taking as of now reconciled against medications ordered at time of hospital discharge: Yes    Review of Systems   Constitutional: Negative.    HENT: Negative.     Eyes: Negative.    Respiratory: Negative.     Cardiovascular: Negative.    Gastrointestinal: Negative.

## 2024-01-12 NOTE — PATIENT INSTRUCTIONS
Patient discharged from the hospital on January 1, here for follow-up, having some leakage around urostomy, seeing urology soon,  Patient still having some swelling in her legs and belly after hospital stay, likely due to increased fluids to treat her sepsis, will add Lasix to be taken daily for the next 30 days with 1 refill, and will have patient follow-up in about a month to make sure this is improved her blood pressure and her swelling.  Has a deep sacral wound which is being followed by wound care through home health nursing, and will collaborate with them regarding this,  Patient to continue home health physical therapy and OT and nursing, patient to return in 1 month.

## 2024-01-30 ENCOUNTER — TELEPHONE (OUTPATIENT)
Dept: FAMILY MEDICINE CLINIC | Facility: CLINIC | Age: 89
End: 2024-01-30

## 2024-01-30 NOTE — TELEPHONE ENCOUNTER
Rylie called from [employer] on 1/29/24.   They stated they did not receive all of the information, and received only one document.      Spoke to a rep from [employer] and they stated that it was for a [employer].     The call was placed to Rylie's voice mail.  Girish left for her to call the MA.

## 2024-02-02 NOTE — TELEPHONE ENCOUNTER
A form was sent on the 29th of January 2024.  They re-sent the forms today.    Spoke to Rylie.      Stated MA will check to see if we received the forms.

## 2024-03-13 ENCOUNTER — TELEPHONE (OUTPATIENT)
Dept: FAMILY MEDICINE CLINIC | Facility: CLINIC | Age: 89
End: 2024-03-13

## 2024-03-13 NOTE — TELEPHONE ENCOUNTER
She was calling to make us aware of this issue.       JAMESON Pantoja from [facility] had the patient elevate her legs and there are not any open areas.  The patient thought that she was no longer supposed to take her lasix, but Scarlett informed the patient that she is still supposed to be taking her lasix.   The patient is now back on her lasix.

## 2024-03-13 NOTE — TELEPHONE ENCOUNTER
Scarlett with [facility] called to inform provider patient is not taking her Lasix and her feet are really red and swollen. The nurse would like a call back.

## 2024-03-14 NOTE — TELEPHONE ENCOUNTER
Please make an appointment to discuss, not seen since January and need to examine her legs.    Looks like she has a new patient appointment elsewhere next week which is fine as well, Dr. Reginald Yanez can examine/care for her if she desires.

## 2024-03-14 NOTE — TELEPHONE ENCOUNTER
Per the daughter, Jayla Mccarthy,  patient is having an issue with emptying her urostomy.  Now has a bottle to empty out her urostomy in the bathroom.      The patient is having a nurse to come in to change the bandages in her feet.      The woodnet's daughter stated that after elevation and the lasix patient's feet look better.     Could not get a sooner appt.     Patient is seeing Reginald Yanez on 3/18/24 and they will have him look at her legs.

## 2024-03-18 ENCOUNTER — OFFICE VISIT (OUTPATIENT)
Dept: FAMILY MEDICINE CLINIC | Facility: CLINIC | Age: 89
End: 2024-03-18
Payer: MEDICARE

## 2024-03-18 VITALS
BODY MASS INDEX: 24.8 KG/M2 | TEMPERATURE: 97.2 F | HEART RATE: 50 BPM | DIASTOLIC BLOOD PRESSURE: 70 MMHG | SYSTOLIC BLOOD PRESSURE: 120 MMHG | OXYGEN SATURATION: 100 % | WEIGHT: 140 LBS | HEIGHT: 63 IN

## 2024-03-18 DIAGNOSIS — E11.622 TYPE 2 DIABETES MELLITUS WITH OTHER SKIN ULCER, WITHOUT LONG-TERM CURRENT USE OF INSULIN (HCC): ICD-10-CM

## 2024-03-18 DIAGNOSIS — E78.49 OTHER HYPERLIPIDEMIA: ICD-10-CM

## 2024-03-18 DIAGNOSIS — E11.319 TYPE 2 DIABETES MELLITUS WITH RETINOPATHY WITHOUT MACULAR EDEMA, WITHOUT LONG-TERM CURRENT USE OF INSULIN, UNSPECIFIED LATERALITY, UNSPECIFIED RETINOPATHY SEVERITY (HCC): ICD-10-CM

## 2024-03-18 DIAGNOSIS — D50.8 OTHER IRON DEFICIENCY ANEMIA: ICD-10-CM

## 2024-03-18 DIAGNOSIS — Z43.6 ATTENTION TO UROSTOMY (HCC): ICD-10-CM

## 2024-03-18 DIAGNOSIS — Z93.9: ICD-10-CM

## 2024-03-18 DIAGNOSIS — C67.9 MALIGNANT NEOPLASM OF URINARY BLADDER, UNSPECIFIED SITE (HCC): Primary | ICD-10-CM

## 2024-03-18 DIAGNOSIS — Z90.6 HX OF TOTAL CYSTECTOMY: ICD-10-CM

## 2024-03-18 DIAGNOSIS — N18.30 BENIGN HYPERTENSION WITH CKD (CHRONIC KIDNEY DISEASE) STAGE III (HCC): ICD-10-CM

## 2024-03-18 DIAGNOSIS — L03.115 CELLULITIS OF RIGHT LOWER EXTREMITY: ICD-10-CM

## 2024-03-18 DIAGNOSIS — I12.9 BENIGN HYPERTENSION WITH CKD (CHRONIC KIDNEY DISEASE) STAGE III (HCC): ICD-10-CM

## 2024-03-18 DIAGNOSIS — K74.60 CIRRHOSIS OF LIVER WITHOUT ASCITES, UNSPECIFIED HEPATIC CIRRHOSIS TYPE (HCC): ICD-10-CM

## 2024-03-18 DIAGNOSIS — I87.331 CHRONIC VENOUS HTN W ULCER AND INFLAMMATION OF R LOW EXTREM (HCC): ICD-10-CM

## 2024-03-18 DIAGNOSIS — Z85.51 HISTORY OF BLADDER CANCER: ICD-10-CM

## 2024-03-18 LAB
ALBUMIN SERPL-MCNC: 2.8 G/DL (ref 3.2–4.6)
ALBUMIN/GLOB SERPL: 0.7 (ref 0.4–1.6)
ALP SERPL-CCNC: 131 U/L (ref 50–136)
ALT SERPL-CCNC: 19 U/L (ref 12–65)
ANION GAP SERPL CALC-SCNC: 3 MMOL/L (ref 2–11)
AST SERPL-CCNC: 17 U/L (ref 15–37)
BASOPHILS # BLD: 0 K/UL (ref 0–0.2)
BASOPHILS NFR BLD: 0 % (ref 0–2)
BILIRUB SERPL-MCNC: 0.2 MG/DL (ref 0.2–1.1)
BUN SERPL-MCNC: 71 MG/DL (ref 8–23)
CALCIUM SERPL-MCNC: 9.6 MG/DL (ref 8.3–10.4)
CHLORIDE SERPL-SCNC: 118 MMOL/L (ref 103–113)
CHOLEST SERPL-MCNC: 100 MG/DL
CO2 SERPL-SCNC: 20 MMOL/L (ref 21–32)
CREAT SERPL-MCNC: 2.5 MG/DL (ref 0.6–1)
DIFFERENTIAL METHOD BLD: ABNORMAL
EOSINOPHIL # BLD: 0.2 K/UL (ref 0–0.8)
EOSINOPHIL NFR BLD: 2 % (ref 0.5–7.8)
ERYTHROCYTE [DISTWIDTH] IN BLOOD BY AUTOMATED COUNT: 13.5 % (ref 11.9–14.6)
FERRITIN SERPL-MCNC: 75 NG/ML (ref 8–388)
GLOBULIN SER CALC-MCNC: 4.3 G/DL (ref 2.8–4.5)
GLUCOSE SERPL-MCNC: 153 MG/DL (ref 65–100)
HCT VFR BLD AUTO: 29 % (ref 35.8–46.3)
HDLC SERPL-MCNC: 31 MG/DL (ref 40–60)
HDLC SERPL: 3.2
HGB BLD-MCNC: 8.9 G/DL (ref 11.7–15.4)
IMM GRANULOCYTES # BLD AUTO: 0.1 K/UL (ref 0–0.5)
IMM GRANULOCYTES NFR BLD AUTO: 1 % (ref 0–5)
IRON SERPL-MCNC: 28 UG/DL (ref 35–150)
LDLC SERPL CALC-MCNC: 38.4 MG/DL
LYMPHOCYTES # BLD: 2.1 K/UL (ref 0.5–4.6)
LYMPHOCYTES NFR BLD: 26 % (ref 13–44)
MCH RBC QN AUTO: 30 PG (ref 26.1–32.9)
MCHC RBC AUTO-ENTMCNC: 30.7 G/DL (ref 31.4–35)
MCV RBC AUTO: 97.6 FL (ref 82–102)
MONOCYTES # BLD: 0.5 K/UL (ref 0.1–1.3)
MONOCYTES NFR BLD: 6 % (ref 4–12)
NEUTS SEG # BLD: 5.4 K/UL (ref 1.7–8.2)
NEUTS SEG NFR BLD: 65 % (ref 43–78)
NRBC # BLD: 0 K/UL (ref 0–0.2)
PLATELET # BLD AUTO: 269 K/UL (ref 150–450)
PMV BLD AUTO: 10.2 FL (ref 9.4–12.3)
POTASSIUM SERPL-SCNC: 4.8 MMOL/L (ref 3.5–5.1)
PROT SERPL-MCNC: 7.1 G/DL (ref 6.3–8.2)
RBC # BLD AUTO: 2.97 M/UL (ref 4.05–5.2)
SODIUM SERPL-SCNC: 141 MMOL/L (ref 136–146)
TRIGL SERPL-MCNC: 153 MG/DL (ref 35–150)
VLDLC SERPL CALC-MCNC: 30.6 MG/DL (ref 6–23)
WBC # BLD AUTO: 8.2 K/UL (ref 4.3–11.1)

## 2024-03-18 PROCEDURE — G8484 FLU IMMUNIZE NO ADMIN: HCPCS | Performed by: FAMILY MEDICINE

## 2024-03-18 PROCEDURE — 1123F ACP DISCUSS/DSCN MKR DOCD: CPT | Performed by: FAMILY MEDICINE

## 2024-03-18 PROCEDURE — G8427 DOCREV CUR MEDS BY ELIG CLIN: HCPCS | Performed by: FAMILY MEDICINE

## 2024-03-18 PROCEDURE — 1090F PRES/ABSN URINE INCON ASSESS: CPT | Performed by: FAMILY MEDICINE

## 2024-03-18 PROCEDURE — 99203 OFFICE O/P NEW LOW 30 MIN: CPT | Performed by: FAMILY MEDICINE

## 2024-03-18 PROCEDURE — 1036F TOBACCO NON-USER: CPT | Performed by: FAMILY MEDICINE

## 2024-03-18 PROCEDURE — G8420 CALC BMI NORM PARAMETERS: HCPCS | Performed by: FAMILY MEDICINE

## 2024-03-18 PROCEDURE — 3044F HG A1C LEVEL LT 7.0%: CPT | Performed by: FAMILY MEDICINE

## 2024-03-18 RX ORDER — CEFADROXIL 500 MG/1
500 CAPSULE ORAL 2 TIMES DAILY
Qty: 20 CAPSULE | Refills: 0 | Status: SHIPPED | OUTPATIENT
Start: 2024-03-18 | End: 2024-03-28

## 2024-03-19 ENCOUNTER — TELEPHONE (OUTPATIENT)
Dept: UROLOGY | Age: 89
End: 2024-03-19

## 2024-03-19 LAB
EST. AVERAGE GLUCOSE BLD GHB EST-MCNC: 134 MG/DL
HBA1C MFR BLD: 6.3 % (ref 4.8–5.6)

## 2024-03-19 ASSESSMENT — ENCOUNTER SYMPTOMS
COUGH: 0
ABDOMINAL PAIN: 0
COLOR CHANGE: 0
SHORTNESS OF BREATH: 0
BLOOD IN STOOL: 0
PHOTOPHOBIA: 0
NAUSEA: 0
ABDOMINAL DISTENTION: 0
SWOLLEN GLANDS: 0
BLURRED VISION: 0
EYE PAIN: 0

## 2024-03-19 NOTE — TELEPHONE ENCOUNTER
Next avail OV with [facility]. Left vm for pt to [facility] appt. Pt is established with [facility] will not need a new referral until 5/10/202.

## 2024-03-20 NOTE — PROGRESS NOTES
Hannah Bauer  1935 is a 88 y.o. female ,Established patient, here for evaluation of the following chief complaint(s):   Chief Complaint   Patient presents with    New Patient     Been having pain in her both feet- also has a sore on her back side she would like to have looked at due to being diabetic           1. Malignant neoplasm of urinary bladder, unspecified site (HCC)  -     CBC with Auto Differential; Future  -     Comprehensive Metabolic Panel; Future  2. Artificial opening status, unspecified (HCC)  -     CBC with Auto Differential; Future  -     Comprehensive Metabolic Panel; Future  3. Type 2 diabetes mellitus with other skin ulcer, without long-term current use of insulin ([facility])  -     Comprehensive Metabolic Panel; Future  -     Hemoglobin A1C; Future  4. 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 These include sacral and heel ulcers.    5. 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 Benign hypertension with CKD (chronic kidney disease) stage III (HCC)  -     CBC with Auto Differential; Future  -     Comprehensive Metabolic Panel; Future  7. Other hyperlipidemia  -     Lipid Panel; Future  8. Other iron deficiency anemia  -     Ferritin; Future  -     Iron; Future  9. Cellulitis of right lower extremity  -     cefadroxil (DURICEF) 500 MG capsule; Take 1 capsule by mouth 2 times daily for 10 days, Disp-20 capsule, R-0Normal  10. Cirrhosis of liver without ascites, unspecified hepatic cirrhosis type (HCC)  -     US ABDOMEN LIMITED; Future  11. History of bladder cancer  -     [facility] Jonnathan Tucker MD, Urology, [place]  12. Attention to urostomy (HCC)  -     [facility] Jonnathan Tucker MD, Urology,

## 2024-04-25 ENCOUNTER — OFFICE VISIT (OUTPATIENT)
Dept: FAMILY MEDICINE CLINIC | Facility: CLINIC | Age: 89
End: 2024-04-25

## 2024-04-25 VITALS
WEIGHT: 137.6 LBS | HEART RATE: 53 BPM | DIASTOLIC BLOOD PRESSURE: 80 MMHG | SYSTOLIC BLOOD PRESSURE: 118 MMHG | TEMPERATURE: 97.6 F | BODY MASS INDEX: 24.37 KG/M2

## 2024-04-25 DIAGNOSIS — Z93.9: ICD-10-CM

## 2024-04-25 DIAGNOSIS — E11.9 TYPE 2 DIABETES MELLITUS WITHOUT COMPLICATION, WITHOUT LONG-TERM CURRENT USE OF INSULIN (HCC): ICD-10-CM

## 2024-04-25 DIAGNOSIS — Z85.51 HISTORY OF BLADDER CANCER: ICD-10-CM

## 2024-04-25 DIAGNOSIS — Z43.6 ATTENTION TO UROSTOMY (HCC): ICD-10-CM

## 2024-04-25 DIAGNOSIS — Z90.6 H/O TOTAL CYSTECTOMY: ICD-10-CM

## 2024-04-25 DIAGNOSIS — N18.9 CHRONIC KIDNEY DISEASE, UNSPECIFIED CKD STAGE: Primary | ICD-10-CM

## 2024-04-25 DIAGNOSIS — L97.912 NON-PRESSURE CHRONIC ULCER OF RIGHT LOWER LEG WITH FAT LAYER EXPOSED (HCC): ICD-10-CM

## 2024-04-25 LAB
ANION GAP SERPL CALC-SCNC: 10 MMOL/L (ref 9–18)
BASOPHILS # BLD: 0 K/UL (ref 0–0.2)
BASOPHILS NFR BLD: 1 % (ref 0–2)
BUN SERPL-MCNC: 59 MG/DL (ref 8–23)
CALCIUM SERPL-MCNC: 9.3 MG/DL (ref 8.8–10.2)
CHLORIDE SERPL-SCNC: 112 MMOL/L (ref 98–107)
CO2 SERPL-SCNC: 17 MMOL/L (ref 20–28)
CREAT SERPL-MCNC: 1.92 MG/DL (ref 0.6–1.1)
DIFFERENTIAL METHOD BLD: ABNORMAL
EOSINOPHIL # BLD: 0.1 K/UL (ref 0–0.8)
EOSINOPHIL NFR BLD: 2 % (ref 0.5–7.8)
ERYTHROCYTE [DISTWIDTH] IN BLOOD BY AUTOMATED COUNT: 14.8 % (ref 11.9–14.6)
GLUCOSE SERPL-MCNC: 146 MG/DL (ref 70–99)
HCT VFR BLD AUTO: 27.8 % (ref 35.8–46.3)
HGB BLD-MCNC: 8.3 G/DL (ref 11.7–15.4)
IMM GRANULOCYTES # BLD AUTO: 0 K/UL (ref 0–0.5)
IMM GRANULOCYTES NFR BLD AUTO: 1 % (ref 0–5)
LYMPHOCYTES # BLD: 2.4 K/UL (ref 0.5–4.6)
LYMPHOCYTES NFR BLD: 39 % (ref 13–44)
MCH RBC QN AUTO: 30 PG (ref 26.1–32.9)
MCHC RBC AUTO-ENTMCNC: 29.9 G/DL (ref 31.4–35)
MCV RBC AUTO: 100.4 FL (ref 82–102)
MONOCYTES # BLD: 0.5 K/UL (ref 0.1–1.3)
MONOCYTES NFR BLD: 9 % (ref 4–12)
NEUTS SEG # BLD: 3.1 K/UL (ref 1.7–8.2)
NEUTS SEG NFR BLD: 48 % (ref 43–78)
NRBC # BLD: 0 K/UL (ref 0–0.2)
PLATELET # BLD AUTO: 160 K/UL (ref 150–450)
PMV BLD AUTO: 11.4 FL (ref 9.4–12.3)
POTASSIUM SERPL-SCNC: 5.3 MMOL/L (ref 3.5–5.1)
RBC # BLD AUTO: 2.77 M/UL (ref 4.05–5.2)
SODIUM SERPL-SCNC: 140 MMOL/L (ref 136–145)
WBC # BLD AUTO: 6.2 K/UL (ref 4.3–11.1)

## 2024-04-25 RX ORDER — METFORMIN HYDROCHLORIDE 500 MG/1
500 TABLET, EXTENDED RELEASE ORAL 2 TIMES DAILY
Qty: 180 TABLET | Refills: 3 | Status: CANCELLED | OUTPATIENT
Start: 2024-04-25

## 2024-04-26 DIAGNOSIS — D50.8 OTHER IRON DEFICIENCY ANEMIA: Primary | ICD-10-CM

## 2024-04-26 ASSESSMENT — ENCOUNTER SYMPTOMS
SHORTNESS OF BREATH: 0
SORE THROAT: 0
EYE PAIN: 0
BLOOD IN STOOL: 0
ABDOMINAL DISTENTION: 0
NAUSEA: 0
ABDOMINAL PAIN: 0
PHOTOPHOBIA: 0
BLURRED VISION: 0
COUGH: 0

## 2024-04-27 NOTE — PROGRESS NOTES
Hannah Bauer  1935 is a 88 y.o. female ,Established patient, here for evaluation of the following chief complaint(s):   Chief Complaint   Patient presents with    1 Month Follow-Up     Well be having a cataract taken off soon-     Medication Refill          1. Chronic kidney disease, unspecified CKD stage  -     Basic Metabolic Panel; Future  -     CBC with Auto Differential; Future  -     [facility]  2. History of bladder cancer--remote---reason for total cystectomy.  3. H/O total cystectomy--as above    4. Attention to urostomy ([facility])--present for years ; patient continues care.  5. Type 2 diabetes mellitus without complication, without long-term current use of insulin ([facility])--stop metformin at this time due to low Cr clearance---AT THIS POINT SHE WOULD ONLY QUALIFY FOR A GLP1 which may be considered--await creatinine result    6. Artificial opening status, unspecified ([facility])  Assessment & Plan:   At goal, continue current medications and RECENT ADMISSION FOR INFECTION TREATED AND RESOLVED.  7. Non-pressure chronic ulcer of right lower leg with fat layer exposed ([facility])  Assessment & Plan:    MANAGED BY HOME HEALTH ---THEY WERE TO SEND ME IMAGES BUT HAVE NOT YET RECEIVED THEM---IMPROVING ACCORDING TO PATIENT AND DAUGHTER        Return in about 2 weeks (around 5/9/2024).        Subjective   SUBJECTIVE/OBJECTIVE:  Here to follow creatinine 2.50----she had had episode of dehydration but states that she is better now.    Medication Refill  This is a chronic problem. The current episode started more than 1 year ago. The problem occurs daily. The problem has been unchanged. Pertinent negatives include no abdominal pain, chest pain, chills, coughing, fever, headaches, joint swelling, myalgias, nausea, rash, sore throat or weakness.   Hypertension  This is a chronic problem. The current episode started more than 1 year ago. The problem has been rapidly improving since onset. The problem is controlled.

## 2024-04-27 NOTE — ASSESSMENT & PLAN NOTE
MANAGED BY HOME HEALTH ---THEY WERE TO SEND ME IMAGES BUT HAVE NOT YET RECEIVED THEM---IMPROVING ACCORDING TO PATIENT AND DAUGHTER

## 2024-05-03 DIAGNOSIS — R60.9 EDEMA, UNSPECIFIED TYPE: Primary | ICD-10-CM

## 2024-05-03 RX ORDER — FUROSEMIDE 20 MG/1
20 TABLET ORAL DAILY
Qty: 30 TABLET | Refills: 1 | OUTPATIENT
Start: 2024-05-03

## 2024-05-03 RX ORDER — FUROSEMIDE 20 MG/1
20 TABLET ORAL DAILY
Qty: 30 TABLET | Refills: 1 | Status: SHIPPED | OUTPATIENT
Start: 2024-05-03

## 2024-05-06 ENCOUNTER — TELEPHONE (OUTPATIENT)
Dept: FAMILY MEDICINE CLINIC | Facility: CLINIC | Age: 89
End: 2024-05-06

## 2024-05-10 ENCOUNTER — OFFICE VISIT (OUTPATIENT)
Dept: FAMILY MEDICINE CLINIC | Facility: CLINIC | Age: 89
End: 2024-05-10

## 2024-05-10 VITALS
TEMPERATURE: 97.2 F | HEIGHT: 63 IN | BODY MASS INDEX: 24.27 KG/M2 | SYSTOLIC BLOOD PRESSURE: 100 MMHG | DIASTOLIC BLOOD PRESSURE: 60 MMHG | WEIGHT: 137 LBS

## 2024-05-10 DIAGNOSIS — N18.9 CHRONIC KIDNEY DISEASE, UNSPECIFIED CKD STAGE: ICD-10-CM

## 2024-05-10 DIAGNOSIS — E11.9 TYPE 2 DIABETES MELLITUS WITHOUT COMPLICATION, WITHOUT LONG-TERM CURRENT USE OF INSULIN (HCC): Primary | ICD-10-CM

## 2024-05-10 DIAGNOSIS — L97.519 ULCER OF RIGHT FOOT, UNSPECIFIED ULCER STAGE (HCC): ICD-10-CM

## 2024-05-10 LAB
BUN SERPL-MCNC: 75 MG/DL (ref 8–23)
CALCIUM SERPL-MCNC: 9.3 MG/DL (ref 8.8–10.2)
CHLORIDE SERPL-SCNC: 113 MMOL/L (ref 98–107)
CO2 SERPL-SCNC: 14 MMOL/L (ref 20–28)
CREAT SERPL-MCNC: 2.12 MG/DL (ref 0.6–1.1)
GLUCOSE SERPL-MCNC: 135 MG/DL (ref 70–99)
POTASSIUM SERPL-SCNC: 5.3 MMOL/L (ref 3.5–5.1)
SODIUM SERPL-SCNC: 139 MMOL/L (ref 136–145)

## 2024-05-10 RX ORDER — PROCHLORPERAZINE 25 MG/1
1 SUPPOSITORY RECTAL DAILY
Qty: 4 EACH | Refills: 5 | Status: SHIPPED | OUTPATIENT
Start: 2024-05-10

## 2024-05-13 ENCOUNTER — OFFICE VISIT (OUTPATIENT)
Dept: UROLOGY | Age: 89
End: 2024-05-13
Payer: MEDICARE

## 2024-05-13 DIAGNOSIS — C67.9 MALIGNANT NEOPLASM OF URINARY BLADDER, UNSPECIFIED SITE (HCC): Primary | ICD-10-CM

## 2024-05-13 DIAGNOSIS — N18.9 CHRONIC KIDNEY DISEASE, UNSPECIFIED CKD STAGE: ICD-10-CM

## 2024-05-13 PROCEDURE — 1090F PRES/ABSN URINE INCON ASSESS: CPT | Performed by: NURSE PRACTITIONER

## 2024-05-13 PROCEDURE — 1123F ACP DISCUSS/DSCN MKR DOCD: CPT | Performed by: NURSE PRACTITIONER

## 2024-05-13 PROCEDURE — 1036F TOBACCO NON-USER: CPT | Performed by: NURSE PRACTITIONER

## 2024-05-13 PROCEDURE — G8420 CALC BMI NORM PARAMETERS: HCPCS | Performed by: NURSE PRACTITIONER

## 2024-05-13 PROCEDURE — G8427 DOCREV CUR MEDS BY ELIG CLIN: HCPCS | Performed by: NURSE PRACTITIONER

## 2024-05-13 PROCEDURE — 99213 OFFICE O/P EST LOW 20 MIN: CPT | Performed by: NURSE PRACTITIONER

## 2024-05-13 ASSESSMENT — ENCOUNTER SYMPTOMS
NAUSEA: 0
BACK PAIN: 0

## 2024-05-13 NOTE — PROGRESS NOTES
[facility]  [address]  [place] [address]  [phone]          Hannah Bauer  : 1935    Chief Complaint   Patient presents with    Other     Check urostomy site          HPI     Hannah Bauer is a 88 y.o. female initially referred by PCP to establish care in . H/O bladder CA. Underwent cystectomy and total hysterectomy back in  by Dr Pierre in [place], [place]. Since this, she cannot remember any follow up imaging to evaluate for recurrence. She reports the cancer was isolated to the bladder. She is unsure of the last time she saw an urologist but believes it was over one decade ago. Previous urology records are not available for review.     She denies h/o renal stone or recurrent UTI. NO gross hematuria or abd/flank pain. She was been having A LOT of trouble w her illeostomy. She reported the initial wafer and bag she used were discontinued. Since this, she has had a difficulty time keeping the bag from leaking after about 1-2 days. She was seen by ALEJANDRA Brand RN w wound care. Found better supplies. Now has home health. This is working well.      Cont w wound care for a leg ulcer.      Has newly dx CKD. Prev CT a/p  showed NO urinary obstruction. sCr around 2. Sees nephrology later this month.     With 2 daughters today.       Past Medical History:   Diagnosis Date    Arthritis     Cancer (HCC)     Diabetes (HCC)     History of urostomy     Hypertension     Sepsis (HCC)     due to pressure sore on tailbone  hx of hospitallization      Past Surgical History:   Procedure Laterality Date    ANTERIOR CRUCIATE LIGAMENT REPAIR      CATARACT REMOVAL Left     CHOLECYSTECTOMY      GYN      HX CYSTECTOMY      Bladder removal wears a bag    HYSTERECTOMY, VAGINAL      ORTHOPEDIC SURGERY      TOE AMPUTATION  2021    pt had a hangmail which infected     Current Outpatient Medications   Medication Sig Dispense Refill    SITagliptin (JANUVIA) 50 MG

## 2024-05-28 ENCOUNTER — TELEPHONE (OUTPATIENT)
Dept: FAMILY MEDICINE CLINIC | Facility: CLINIC | Age: 89
End: 2024-05-28

## 2024-05-28 NOTE — TELEPHONE ENCOUNTER
----- Message -----       From:Hannah Bauer       Sent:5/21/2024  9:54 AM EDT         To:Office of Nicole Gonzalez    Subject:Medication Renewal Request    Refills have been requested for the following medications:        atenolol (TENORMIN) 50 MG tablet [Nicole Gonzalez, PA]      Patient Comment: This had been upgraded to a once a day 100mg tablet. Please check to see if it can be a 100mg again.     Preferred pharmacy: [id number] - [place], [place] - [address] - P [phone] - F [phone]   Detail Level: Detailed

## 2024-05-29 NOTE — TELEPHONE ENCOUNTER
It appears that Dr. Yanez is now her PCP.  Please ask pt to contact his office for this refill.

## 2024-05-29 NOTE — TELEPHONE ENCOUNTER
Left message via patient's voice mail letting her know that she should contact her current PCP for refills of medications.

## 2024-06-04 DIAGNOSIS — I12.9 BENIGN HYPERTENSION WITH CKD (CHRONIC KIDNEY DISEASE) STAGE III (HCC): ICD-10-CM

## 2024-06-04 DIAGNOSIS — E11.9 TYPE 2 DIABETES MELLITUS WITHOUT COMPLICATION, WITHOUT LONG-TERM CURRENT USE OF INSULIN (HCC): ICD-10-CM

## 2024-06-04 DIAGNOSIS — R60.9 EDEMA, UNSPECIFIED TYPE: ICD-10-CM

## 2024-06-04 DIAGNOSIS — N18.30 BENIGN HYPERTENSION WITH CKD (CHRONIC KIDNEY DISEASE) STAGE III (HCC): ICD-10-CM

## 2024-06-04 RX ORDER — FUROSEMIDE 20 MG/1
20 TABLET ORAL DAILY
Qty: 30 TABLET | Refills: 1 | Status: SHIPPED | OUTPATIENT
Start: 2024-06-04

## 2024-06-04 RX ORDER — ATENOLOL 50 MG/1
50 TABLET ORAL 2 TIMES DAILY
Qty: 180 TABLET | Refills: 3 | Status: SHIPPED | OUTPATIENT
Start: 2024-06-04

## 2024-06-04 RX ORDER — LOSARTAN POTASSIUM 50 MG/1
50 TABLET ORAL DAILY
Qty: 90 TABLET | Refills: 3 | Status: SHIPPED | OUTPATIENT
Start: 2024-06-04

## 2024-07-01 DIAGNOSIS — R60.9 EDEMA, UNSPECIFIED TYPE: ICD-10-CM

## 2024-07-01 RX ORDER — FUROSEMIDE 20 MG/1
20 TABLET ORAL DAILY
Qty: 30 TABLET | Refills: 1 | Status: SHIPPED | OUTPATIENT
Start: 2024-07-01

## 2024-07-19 ENCOUNTER — TELEPHONE (OUTPATIENT)
Dept: FAMILY MEDICINE CLINIC | Facility: CLINIC | Age: 89
End: 2024-07-19

## 2024-09-04 DIAGNOSIS — R60.9 EDEMA, UNSPECIFIED TYPE: ICD-10-CM

## 2024-09-04 RX ORDER — FUROSEMIDE 20 MG
20 TABLET ORAL DAILY
Qty: 30 TABLET | Refills: 1 | Status: SHIPPED | OUTPATIENT
Start: 2024-09-04

## 2024-10-09 DIAGNOSIS — R60.9 EDEMA, UNSPECIFIED TYPE: ICD-10-CM

## 2024-10-10 RX ORDER — FUROSEMIDE 20 MG
20 TABLET ORAL DAILY
Qty: 90 TABLET | Refills: 0 | Status: SHIPPED | OUTPATIENT
Start: 2024-10-10

## 2024-12-30 DIAGNOSIS — E11.9 TYPE 2 DIABETES MELLITUS WITHOUT COMPLICATION, WITHOUT LONG-TERM CURRENT USE OF INSULIN (HCC): ICD-10-CM

## 2024-12-30 RX ORDER — SITAGLIPTIN 50 MG/1
50 TABLET, FILM COATED ORAL DAILY
Qty: 30 TABLET | Refills: 5 | Status: SHIPPED | OUTPATIENT
Start: 2024-12-30

## 2024-12-31 NOTE — TELEPHONE ENCOUNTER
I sent the requested medication/product in. Please let patient know to check with their pharmacy  Thanks  Cassandra RAMIREZ

## 2025-01-05 DIAGNOSIS — R60.9 EDEMA, UNSPECIFIED TYPE: ICD-10-CM

## 2025-01-05 DIAGNOSIS — E11.9 TYPE 2 DIABETES MELLITUS WITHOUT COMPLICATION, WITHOUT LONG-TERM CURRENT USE OF INSULIN (HCC): ICD-10-CM

## 2025-01-06 RX ORDER — FUROSEMIDE 20 MG/1
20 TABLET ORAL DAILY
Qty: 90 TABLET | Refills: 0 | Status: SHIPPED | OUTPATIENT
Start: 2025-01-06